# Patient Record
Sex: MALE | Race: WHITE | NOT HISPANIC OR LATINO | URBAN - METROPOLITAN AREA
[De-identification: names, ages, dates, MRNs, and addresses within clinical notes are randomized per-mention and may not be internally consistent; named-entity substitution may affect disease eponyms.]

---

## 2018-04-25 ENCOUNTER — APPOINTMENT (OUTPATIENT)
Dept: RADIOLOGY | Facility: MEDICAL CENTER | Age: 48
DRG: 231 | End: 2018-04-25
Attending: EMERGENCY MEDICINE
Payer: COMMERCIAL

## 2018-04-25 ENCOUNTER — HOSPITAL ENCOUNTER (INPATIENT)
Facility: MEDICAL CENTER | Age: 48
LOS: 13 days | DRG: 231 | End: 2018-05-08
Attending: EMERGENCY MEDICINE | Admitting: INTERNAL MEDICINE
Payer: COMMERCIAL

## 2018-04-25 DIAGNOSIS — I21.3 ST ELEVATION MYOCARDIAL INFARCTION (STEMI), UNSPECIFIED ARTERY (HCC): ICD-10-CM

## 2018-04-25 DIAGNOSIS — Z95.1 S/P CABG X 3: ICD-10-CM

## 2018-04-25 LAB
APTT PPP: 29.1 SEC (ref 24.7–36)
BASOPHILS # BLD AUTO: 0.3 % (ref 0–1.8)
BASOPHILS # BLD: 0.04 K/UL (ref 0–0.12)
BNP SERPL-MCNC: 47 PG/ML (ref 0–100)
EKG IMPRESSION: NORMAL
EOSINOPHIL # BLD AUTO: 0.17 K/UL (ref 0–0.51)
EOSINOPHIL NFR BLD: 1.4 % (ref 0–6.9)
ERYTHROCYTE [DISTWIDTH] IN BLOOD BY AUTOMATED COUNT: 41.1 FL (ref 35.9–50)
HCT VFR BLD AUTO: 43 % (ref 42–52)
HGB BLD-MCNC: 14.5 G/DL (ref 14–18)
IMM GRANULOCYTES # BLD AUTO: 0.04 K/UL (ref 0–0.11)
IMM GRANULOCYTES NFR BLD AUTO: 0.3 % (ref 0–0.9)
INR PPP: 1.01 (ref 0.87–1.13)
LYMPHOCYTES # BLD AUTO: 1.86 K/UL (ref 1–4.8)
LYMPHOCYTES NFR BLD: 15.8 % (ref 22–41)
MCH RBC QN AUTO: 30.3 PG (ref 27–33)
MCHC RBC AUTO-ENTMCNC: 33.7 G/DL (ref 33.7–35.3)
MCV RBC AUTO: 90 FL (ref 81.4–97.8)
MONOCYTES # BLD AUTO: 0.95 K/UL (ref 0–0.85)
MONOCYTES NFR BLD AUTO: 8.1 % (ref 0–13.4)
NEUTROPHILS # BLD AUTO: 8.68 K/UL (ref 1.82–7.42)
NEUTROPHILS NFR BLD: 74.1 % (ref 44–72)
NRBC # BLD AUTO: 0 K/UL
NRBC BLD-RTO: 0 /100 WBC
PLATELET # BLD AUTO: 145 K/UL (ref 164–446)
PMV BLD AUTO: 11.2 FL (ref 9–12.9)
PROTHROMBIN TIME: 13 SEC (ref 12–14.6)
RBC # BLD AUTO: 4.78 M/UL (ref 4.7–6.1)
WBC # BLD AUTO: 11.7 K/UL (ref 4.8–10.8)

## 2018-04-25 PROCEDURE — 360979 HCHG DIAGNOSTIC CATH

## 2018-04-25 PROCEDURE — 99223 1ST HOSP IP/OBS HIGH 75: CPT | Performed by: HOSPITALIST

## 2018-04-25 PROCEDURE — 307093 HCHG TR BAND RADIAL

## 2018-04-25 PROCEDURE — 83880 ASSAY OF NATRIURETIC PEPTIDE: CPT

## 2018-04-25 PROCEDURE — 99153 MOD SED SAME PHYS/QHP EA: CPT

## 2018-04-25 PROCEDURE — 93458 L HRT ARTERY/VENTRICLE ANGIO: CPT

## 2018-04-25 PROCEDURE — C1887 CATHETER, GUIDING: HCPCS

## 2018-04-25 PROCEDURE — 85610 PROTHROMBIN TIME: CPT

## 2018-04-25 PROCEDURE — C1769 GUIDE WIRE: HCPCS

## 2018-04-25 PROCEDURE — 99285 EMERGENCY DEPT VISIT HI MDM: CPT

## 2018-04-25 PROCEDURE — 304952 HCHG R 2 PADS

## 2018-04-25 PROCEDURE — 71045 X-RAY EXAM CHEST 1 VIEW: CPT

## 2018-04-25 PROCEDURE — C1894 INTRO/SHEATH, NON-LASER: HCPCS

## 2018-04-25 PROCEDURE — 93005 ELECTROCARDIOGRAM TRACING: CPT | Performed by: EMERGENCY MEDICINE

## 2018-04-25 PROCEDURE — 99152 MOD SED SAME PHYS/QHP 5/>YRS: CPT

## 2018-04-25 PROCEDURE — 700101 HCHG RX REV CODE 250

## 2018-04-25 PROCEDURE — 02703ZZ DILATION OF CORONARY ARTERY, ONE ARTERY, PERCUTANEOUS APPROACH: ICD-10-PCS | Performed by: INTERNAL MEDICINE

## 2018-04-25 PROCEDURE — 770022 HCHG ROOM/CARE - ICU (200)

## 2018-04-25 PROCEDURE — B2151ZZ FLUOROSCOPY OF LEFT HEART USING LOW OSMOLAR CONTRAST: ICD-10-PCS | Performed by: INTERNAL MEDICINE

## 2018-04-25 PROCEDURE — 92941 PRQ TRLML REVSC TOT OCCL AMI: CPT | Mod: LC

## 2018-04-25 PROCEDURE — 700111 HCHG RX REV CODE 636 W/ 250 OVERRIDE (IP)

## 2018-04-25 PROCEDURE — C1725 CATH, TRANSLUMIN NON-LASER: HCPCS

## 2018-04-25 PROCEDURE — B2111ZZ FLUOROSCOPY OF MULTIPLE CORONARY ARTERIES USING LOW OSMOLAR CONTRAST: ICD-10-PCS | Performed by: INTERNAL MEDICINE

## 2018-04-25 PROCEDURE — 85347 COAGULATION TIME ACTIVATED: CPT

## 2018-04-25 PROCEDURE — 85730 THROMBOPLASTIN TIME PARTIAL: CPT

## 2018-04-25 PROCEDURE — 85025 COMPLETE CBC W/AUTO DIFF WBC: CPT

## 2018-04-25 PROCEDURE — 4A023N7 MEASUREMENT OF CARDIAC SAMPLING AND PRESSURE, LEFT HEART, PERCUTANEOUS APPROACH: ICD-10-PCS | Performed by: INTERNAL MEDICINE

## 2018-04-25 RX ORDER — LIDOCAINE HYDROCHLORIDE 20 MG/ML
INJECTION, SOLUTION INFILTRATION; PERINEURAL
Status: COMPLETED
Start: 2018-04-25 | End: 2018-04-25

## 2018-04-25 RX ORDER — ONDANSETRON 2 MG/ML
4 INJECTION INTRAMUSCULAR; INTRAVENOUS EVERY 4 HOURS PRN
Status: DISCONTINUED | OUTPATIENT
Start: 2018-04-25 | End: 2018-04-27

## 2018-04-25 RX ORDER — SCOLOPAMINE TRANSDERMAL SYSTEM 1 MG/1
1 PATCH, EXTENDED RELEASE TRANSDERMAL
Status: DISCONTINUED | OUTPATIENT
Start: 2018-04-25 | End: 2018-04-27

## 2018-04-25 RX ORDER — ROSUVASTATIN CALCIUM 10 MG/1
20 TABLET, COATED ORAL EVERY EVENING
Status: DISCONTINUED | OUTPATIENT
Start: 2018-04-26 | End: 2018-05-08 | Stop reason: HOSPADM

## 2018-04-25 RX ORDER — VERAPAMIL HYDROCHLORIDE 2.5 MG/ML
INJECTION, SOLUTION INTRAVENOUS
Status: COMPLETED
Start: 2018-04-25 | End: 2018-04-25

## 2018-04-25 RX ORDER — DIPHENHYDRAMINE HYDROCHLORIDE 50 MG/ML
25 INJECTION INTRAMUSCULAR; INTRAVENOUS EVERY 6 HOURS PRN
Status: DISCONTINUED | OUTPATIENT
Start: 2018-04-25 | End: 2018-04-27

## 2018-04-25 RX ORDER — DEXAMETHASONE SODIUM PHOSPHATE 4 MG/ML
4 INJECTION, SOLUTION INTRA-ARTICULAR; INTRALESIONAL; INTRAMUSCULAR; INTRAVENOUS; SOFT TISSUE
Status: DISCONTINUED | OUTPATIENT
Start: 2018-04-25 | End: 2018-04-27

## 2018-04-25 RX ORDER — HALOPERIDOL 5 MG/ML
1 INJECTION INTRAMUSCULAR EVERY 6 HOURS PRN
Status: DISCONTINUED | OUTPATIENT
Start: 2018-04-25 | End: 2018-04-27

## 2018-04-25 RX ORDER — HEPARIN SODIUM,PORCINE 1000/ML
VIAL (ML) INJECTION
Status: COMPLETED
Start: 2018-04-25 | End: 2018-04-25

## 2018-04-25 RX ORDER — MIDAZOLAM HYDROCHLORIDE 1 MG/ML
INJECTION INTRAMUSCULAR; INTRAVENOUS
Status: COMPLETED
Start: 2018-04-25 | End: 2018-04-25

## 2018-04-25 RX ADMIN — NITROGLYCERIN 10 ML: 20 INJECTION INTRAVENOUS at 23:12

## 2018-04-25 RX ADMIN — FENTANYL CITRATE 50 MCG: 50 INJECTION, SOLUTION INTRAMUSCULAR; INTRAVENOUS at 23:40

## 2018-04-25 RX ADMIN — HEPARIN SODIUM: 1000 INJECTION, SOLUTION INTRAVENOUS; SUBCUTANEOUS at 23:11

## 2018-04-25 RX ADMIN — VERAPAMIL HYDROCHLORIDE 5 MG: 2.5 INJECTION, SOLUTION INTRAVENOUS at 23:12

## 2018-04-25 RX ADMIN — LIDOCAINE HYDROCHLORIDE: 20 INJECTION, SOLUTION INFILTRATION; PERINEURAL at 23:11

## 2018-04-25 RX ADMIN — MIDAZOLAM 1.5 MG: 1 INJECTION INTRAMUSCULAR; INTRAVENOUS at 23:40

## 2018-04-25 ASSESSMENT — LIFESTYLE VARIABLES: EVER_SMOKED: NEVER

## 2018-04-26 ENCOUNTER — APPOINTMENT (OUTPATIENT)
Dept: RADIOLOGY | Facility: MEDICAL CENTER | Age: 48
DRG: 231 | End: 2018-04-26
Attending: NURSE PRACTITIONER
Payer: COMMERCIAL

## 2018-04-26 PROBLEM — I21.3 STEMI (ST ELEVATION MYOCARDIAL INFARCTION) (HCC): Status: ACTIVE | Noted: 2018-04-26

## 2018-04-26 PROBLEM — I10 HTN (HYPERTENSION): Status: ACTIVE | Noted: 2018-04-26

## 2018-04-26 LAB
ABO GROUP BLD: NORMAL
ABO GROUP BLD: NORMAL
ACT BLD: 257 SEC (ref 74–137)
ALBUMIN SERPL BCP-MCNC: 3.6 G/DL (ref 3.2–4.9)
ALBUMIN SERPL BCP-MCNC: 4 G/DL (ref 3.2–4.9)
ALBUMIN/GLOB SERPL: 1.3 G/DL
ALBUMIN/GLOB SERPL: 1.5 G/DL
ALP SERPL-CCNC: 65 U/L (ref 30–99)
ALP SERPL-CCNC: 66 U/L (ref 30–99)
ALT SERPL-CCNC: 67 U/L (ref 2–50)
ALT SERPL-CCNC: 75 U/L (ref 2–50)
ANION GAP SERPL CALC-SCNC: 6 MMOL/L (ref 0–11.9)
ANION GAP SERPL CALC-SCNC: 9 MMOL/L (ref 0–11.9)
APPEARANCE UR: CLEAR
APTT PPP: 44.3 SEC (ref 24.7–36)
APTT PPP: 49.6 SEC (ref 24.7–36)
APTT PPP: 50.6 SEC (ref 24.7–36)
APTT PPP: 63.2 SEC (ref 24.7–36)
AST SERPL-CCNC: 162 U/L (ref 12–45)
AST SERPL-CCNC: 229 U/L (ref 12–45)
BACTERIA #/AREA URNS HPF: NEGATIVE /HPF
BASOPHILS # BLD AUTO: 0.2 % (ref 0–1.8)
BASOPHILS # BLD: 0.02 K/UL (ref 0–0.12)
BILIRUB CONJ SERPL-MCNC: 0.4 MG/DL (ref 0.1–0.5)
BILIRUB INDIRECT SERPL-MCNC: 3.4 MG/DL (ref 0–1)
BILIRUB SERPL-MCNC: 2.6 MG/DL (ref 0.1–1.5)
BILIRUB SERPL-MCNC: 3.8 MG/DL (ref 0.1–1.5)
BILIRUB UR QL STRIP.AUTO: NEGATIVE
BLD GP AB SCN SERPL QL: NORMAL
BUN SERPL-MCNC: 12 MG/DL (ref 8–22)
BUN SERPL-MCNC: 9 MG/DL (ref 8–22)
CALCIUM SERPL-MCNC: 8.7 MG/DL (ref 8.5–10.5)
CALCIUM SERPL-MCNC: 8.9 MG/DL (ref 8.5–10.5)
CHLORIDE SERPL-SCNC: 103 MMOL/L (ref 96–112)
CHLORIDE SERPL-SCNC: 107 MMOL/L (ref 96–112)
CO2 SERPL-SCNC: 24 MMOL/L (ref 20–33)
CO2 SERPL-SCNC: 25 MMOL/L (ref 20–33)
COLOR UR: YELLOW
CREAT SERPL-MCNC: 0.85 MG/DL (ref 0.5–1.4)
CREAT SERPL-MCNC: 1.07 MG/DL (ref 0.5–1.4)
EKG IMPRESSION: NORMAL
EKG IMPRESSION: NORMAL
EOSINOPHIL # BLD AUTO: 0.12 K/UL (ref 0–0.51)
EOSINOPHIL NFR BLD: 1.2 % (ref 0–6.9)
EPI CELLS #/AREA URNS HPF: NEGATIVE /HPF
ERYTHROCYTE [DISTWIDTH] IN BLOOD BY AUTOMATED COUNT: 39.6 FL (ref 35.9–50)
EST. AVERAGE GLUCOSE BLD GHB EST-MCNC: 103 MG/DL
EST. AVERAGE GLUCOSE BLD GHB EST-MCNC: 103 MG/DL
GLOBULIN SER CALC-MCNC: 2.6 G/DL (ref 1.9–3.5)
GLOBULIN SER CALC-MCNC: 2.7 G/DL (ref 1.9–3.5)
GLUCOSE SERPL-MCNC: 103 MG/DL (ref 65–99)
GLUCOSE SERPL-MCNC: 109 MG/DL (ref 65–99)
GLUCOSE UR STRIP.AUTO-MCNC: NEGATIVE MG/DL
HBA1C MFR BLD: 5.2 % (ref 0–5.6)
HBA1C MFR BLD: 5.2 % (ref 0–5.6)
HCT VFR BLD AUTO: 42.3 % (ref 42–52)
HGB BLD-MCNC: 15.1 G/DL (ref 14–18)
HYALINE CASTS #/AREA URNS LPF: ABNORMAL /LPF
IMM GRANULOCYTES # BLD AUTO: 0.03 K/UL (ref 0–0.11)
IMM GRANULOCYTES NFR BLD AUTO: 0.3 % (ref 0–0.9)
INR PPP: 1.08 (ref 0.87–1.13)
KETONES UR STRIP.AUTO-MCNC: ABNORMAL MG/DL
LEUKOCYTE ESTERASE UR QL STRIP.AUTO: NEGATIVE
LIPASE SERPL-CCNC: 82 U/L (ref 11–82)
LV EJECT FRACT  99904: 45
LV EJECT FRACT MOD 2C 99903: 57.22
LV EJECT FRACT MOD 4C 99902: 56.75
LV EJECT FRACT MOD BP 99901: 58.5
LYMPHOCYTES # BLD AUTO: 1.63 K/UL (ref 1–4.8)
LYMPHOCYTES NFR BLD: 16.8 % (ref 22–41)
MCH RBC QN AUTO: 31 PG (ref 27–33)
MCHC RBC AUTO-ENTMCNC: 35.7 G/DL (ref 33.7–35.3)
MCV RBC AUTO: 86.9 FL (ref 81.4–97.8)
MICRO URNS: ABNORMAL
MONOCYTES # BLD AUTO: 0.98 K/UL (ref 0–0.85)
MONOCYTES NFR BLD AUTO: 10.1 % (ref 0–13.4)
NEUTROPHILS # BLD AUTO: 6.9 K/UL (ref 1.82–7.42)
NEUTROPHILS NFR BLD: 71.4 % (ref 44–72)
NITRITE UR QL STRIP.AUTO: NEGATIVE
NRBC # BLD AUTO: 0 K/UL
NRBC BLD-RTO: 0 /100 WBC
PH UR STRIP.AUTO: 6 [PH]
PLATELET # BLD AUTO: 148 K/UL (ref 164–446)
PMV BLD AUTO: 10.5 FL (ref 9–12.9)
POTASSIUM SERPL-SCNC: 3.6 MMOL/L (ref 3.6–5.5)
POTASSIUM SERPL-SCNC: 4 MMOL/L (ref 3.6–5.5)
PROT SERPL-MCNC: 6.3 G/DL (ref 6–8.2)
PROT SERPL-MCNC: 6.6 G/DL (ref 6–8.2)
PROT UR QL STRIP: NEGATIVE MG/DL
PROTHROMBIN TIME: 13.7 SEC (ref 12–14.6)
RBC # BLD AUTO: 4.87 M/UL (ref 4.7–6.1)
RBC # URNS HPF: ABNORMAL /HPF
RBC UR QL AUTO: ABNORMAL
RH BLD: NORMAL
RH BLD: NORMAL
SODIUM SERPL-SCNC: 137 MMOL/L (ref 135–145)
SODIUM SERPL-SCNC: 137 MMOL/L (ref 135–145)
SP GR UR STRIP.AUTO: 1.01
TROPONIN I SERPL-MCNC: >50 NG/ML (ref 0–0.04)
TSH SERPL DL<=0.005 MIU/L-ACNC: 2.03 UIU/ML (ref 0.38–5.33)
UROBILINOGEN UR STRIP.AUTO-MCNC: 1 MG/DL
WBC # BLD AUTO: 9.7 K/UL (ref 4.8–10.8)
WBC #/AREA URNS HPF: ABNORMAL /HPF

## 2018-04-26 PROCEDURE — 700102 HCHG RX REV CODE 250 W/ 637 OVERRIDE(OP): Performed by: NURSE PRACTITIONER

## 2018-04-26 PROCEDURE — 84443 ASSAY THYROID STIM HORMONE: CPT

## 2018-04-26 PROCEDURE — 71046 X-RAY EXAM CHEST 2 VIEWS: CPT

## 2018-04-26 PROCEDURE — 86850 RBC ANTIBODY SCREEN: CPT

## 2018-04-26 PROCEDURE — 85025 COMPLETE CBC W/AUTO DIFF WBC: CPT

## 2018-04-26 PROCEDURE — 700111 HCHG RX REV CODE 636 W/ 250 OVERRIDE (IP): Performed by: NURSE PRACTITIONER

## 2018-04-26 PROCEDURE — 93970 EXTREMITY STUDY: CPT

## 2018-04-26 PROCEDURE — 94010 BREATHING CAPACITY TEST: CPT

## 2018-04-26 PROCEDURE — 93005 ELECTROCARDIOGRAM TRACING: CPT | Performed by: HOSPITALIST

## 2018-04-26 PROCEDURE — 770022 HCHG ROOM/CARE - ICU (200)

## 2018-04-26 PROCEDURE — 93306 TTE W/DOPPLER COMPLETE: CPT

## 2018-04-26 PROCEDURE — 83690 ASSAY OF LIPASE: CPT

## 2018-04-26 PROCEDURE — 85610 PROTHROMBIN TIME: CPT

## 2018-04-26 PROCEDURE — A9270 NON-COVERED ITEM OR SERVICE: HCPCS | Performed by: INTERNAL MEDICINE

## 2018-04-26 PROCEDURE — A9270 NON-COVERED ITEM OR SERVICE: HCPCS | Performed by: HOSPITALIST

## 2018-04-26 PROCEDURE — 700102 HCHG RX REV CODE 250 W/ 637 OVERRIDE(OP): Performed by: HOSPITALIST

## 2018-04-26 PROCEDURE — 82248 BILIRUBIN DIRECT: CPT

## 2018-04-26 PROCEDURE — 86901 BLOOD TYPING SEROLOGIC RH(D): CPT

## 2018-04-26 PROCEDURE — 86900 BLOOD TYPING SEROLOGIC ABO: CPT

## 2018-04-26 PROCEDURE — 700102 HCHG RX REV CODE 250 W/ 637 OVERRIDE(OP): Performed by: INTERNAL MEDICINE

## 2018-04-26 PROCEDURE — 700111 HCHG RX REV CODE 636 W/ 250 OVERRIDE (IP): Performed by: INTERNAL MEDICINE

## 2018-04-26 PROCEDURE — 84484 ASSAY OF TROPONIN QUANT: CPT

## 2018-04-26 PROCEDURE — 93010 ELECTROCARDIOGRAM REPORT: CPT | Mod: 76 | Performed by: INTERNAL MEDICINE

## 2018-04-26 PROCEDURE — 93880 EXTRACRANIAL BILAT STUDY: CPT

## 2018-04-26 PROCEDURE — 80053 COMPREHEN METABOLIC PANEL: CPT

## 2018-04-26 PROCEDURE — 700105 HCHG RX REV CODE 258: Performed by: NURSE PRACTITIONER

## 2018-04-26 PROCEDURE — 76705 ECHO EXAM OF ABDOMEN: CPT

## 2018-04-26 PROCEDURE — 85730 THROMBOPLASTIN TIME PARTIAL: CPT

## 2018-04-26 PROCEDURE — 93005 ELECTROCARDIOGRAM TRACING: CPT | Performed by: INTERNAL MEDICINE

## 2018-04-26 PROCEDURE — 700111 HCHG RX REV CODE 636 W/ 250 OVERRIDE (IP): Performed by: HOSPITALIST

## 2018-04-26 PROCEDURE — 99233 SBSQ HOSP IP/OBS HIGH 50: CPT | Performed by: HOSPITALIST

## 2018-04-26 PROCEDURE — 83036 HEMOGLOBIN GLYCOSYLATED A1C: CPT

## 2018-04-26 PROCEDURE — 700101 HCHG RX REV CODE 250: Performed by: NURSE PRACTITIONER

## 2018-04-26 PROCEDURE — 93306 TTE W/DOPPLER COMPLETE: CPT | Mod: 26 | Performed by: INTERNAL MEDICINE

## 2018-04-26 PROCEDURE — 81001 URINALYSIS AUTO W/SCOPE: CPT

## 2018-04-26 RX ORDER — ALPRAZOLAM 0.25 MG/1
0.25 TABLET ORAL EVERY 6 HOURS PRN
Status: DISCONTINUED | OUTPATIENT
Start: 2018-04-26 | End: 2018-04-27

## 2018-04-26 RX ORDER — LOSARTAN POTASSIUM 25 MG/1
TABLET ORAL DAILY
Status: ON HOLD | COMMUNITY
End: 2018-05-08

## 2018-04-26 RX ORDER — MORPHINE SULFATE 4 MG/ML
2-4 INJECTION, SOLUTION INTRAMUSCULAR; INTRAVENOUS
Status: DISCONTINUED | OUTPATIENT
Start: 2018-04-26 | End: 2018-04-27

## 2018-04-26 RX ORDER — NITROGLYCERIN 0.4 MG/1
0.4 TABLET SUBLINGUAL
Status: DISCONTINUED | OUTPATIENT
Start: 2018-04-26 | End: 2018-04-27

## 2018-04-26 RX ORDER — LOSARTAN POTASSIUM 25 MG/1
50 TABLET ORAL DAILY
Status: DISCONTINUED | OUTPATIENT
Start: 2018-04-26 | End: 2018-05-08 | Stop reason: HOSPADM

## 2018-04-26 RX ORDER — BISACODYL 10 MG
10 SUPPOSITORY, RECTAL RECTAL
Status: DISCONTINUED | OUTPATIENT
Start: 2018-04-26 | End: 2018-04-27

## 2018-04-26 RX ORDER — AMOXICILLIN 250 MG
2 CAPSULE ORAL 2 TIMES DAILY
Status: DISCONTINUED | OUTPATIENT
Start: 2018-04-26 | End: 2018-04-27

## 2018-04-26 RX ORDER — HEPARIN SODIUM 1000 [USP'U]/ML
4000 INJECTION, SOLUTION INTRAVENOUS; SUBCUTANEOUS PRN
Status: DISCONTINUED | OUTPATIENT
Start: 2018-04-25 | End: 2018-04-27

## 2018-04-26 RX ORDER — HYDRALAZINE HYDROCHLORIDE 20 MG/ML
20 INJECTION INTRAMUSCULAR; INTRAVENOUS EVERY 6 HOURS PRN
Status: DISCONTINUED | OUTPATIENT
Start: 2018-04-26 | End: 2018-04-27

## 2018-04-26 RX ORDER — POLYETHYLENE GLYCOL 3350 17 G/17G
1 POWDER, FOR SOLUTION ORAL
Status: DISCONTINUED | OUTPATIENT
Start: 2018-04-26 | End: 2018-04-27

## 2018-04-26 RX ORDER — METOPROLOL TARTRATE 50 MG/1
TABLET, FILM COATED ORAL 2 TIMES DAILY
COMMUNITY

## 2018-04-26 RX ADMIN — MUPIROCIN 1 APPLICATION: 20 OINTMENT TOPICAL at 22:48

## 2018-04-26 RX ADMIN — METOPROLOL TARTRATE 25 MG: 25 TABLET, FILM COATED ORAL at 20:22

## 2018-04-26 RX ADMIN — METOPROLOL TARTRATE 25 MG: 25 TABLET, FILM COATED ORAL at 11:10

## 2018-04-26 RX ADMIN — HEPARIN SODIUM 4000 UNITS: 1000 INJECTION, SOLUTION INTRAVENOUS; SUBCUTANEOUS at 09:49

## 2018-04-26 RX ADMIN — ROSUVASTATIN CALCIUM 20 MG: 10 TABLET, FILM COATED ORAL at 00:27

## 2018-04-26 RX ADMIN — ASPIRIN 81 MG: 81 TABLET, COATED ORAL at 07:54

## 2018-04-26 RX ADMIN — LOSARTAN POTASSIUM 50 MG: 25 TABLET, FILM COATED ORAL at 07:54

## 2018-04-26 RX ADMIN — MUPIROCIN 1 APPLICATION: 20 OINTMENT TOPICAL at 16:33

## 2018-04-26 RX ADMIN — HEPARIN SODIUM 1000 UNITS/HR: 5000 INJECTION, SOLUTION INTRAVENOUS; SUBCUTANEOUS at 01:26

## 2018-04-26 RX ADMIN — HEPARIN SODIUM 4000 UNITS: 1000 INJECTION, SOLUTION INTRAVENOUS; SUBCUTANEOUS at 23:30

## 2018-04-26 RX ADMIN — ROSUVASTATIN CALCIUM 20 MG: 10 TABLET, FILM COATED ORAL at 20:22

## 2018-04-26 RX ADMIN — STANDARDIZED SENNA CONCENTRATE AND DOCUSATE SODIUM 2 TABLET: 8.6; 5 TABLET, FILM COATED ORAL at 07:54

## 2018-04-26 RX ADMIN — MORPHINE SULFATE 4 MG: 4 INJECTION INTRAVENOUS at 02:03

## 2018-04-26 ASSESSMENT — PULMONARY FUNCTION TESTS
FEV1: 2.62
FEV1/FVC: 82%
PEFR: 7.34

## 2018-04-26 ASSESSMENT — PAIN SCALES - GENERAL
PAINLEVEL_OUTOF10: 2
PAINLEVEL_OUTOF10: 1
PAINLEVEL_OUTOF10: 0
PAINLEVEL_OUTOF10: 1
PAINLEVEL_OUTOF10: 0
PAINLEVEL_OUTOF10: 1
PAINLEVEL_OUTOF10: 0
PAINLEVEL_OUTOF10: 3
PAINLEVEL_OUTOF10: 0
PAINLEVEL_OUTOF10: 3
PAINLEVEL_OUTOF10: 0
PAINLEVEL_OUTOF10: 1

## 2018-04-26 ASSESSMENT — ENCOUNTER SYMPTOMS
DIZZINESS: 0
HEADACHES: 0
TINGLING: 0
NAUSEA: 0
WHEEZING: 0
FOCAL WEAKNESS: 0
VOMITING: 0
ABDOMINAL PAIN: 0
FEVER: 0
SHORTNESS OF BREATH: 1
PALPITATIONS: 0
SORE THROAT: 0
COUGH: 0
CHILLS: 0
DEPRESSION: 0
PHOTOPHOBIA: 0
DIARRHEA: 0
MYALGIAS: 0

## 2018-04-26 ASSESSMENT — PATIENT HEALTH QUESTIONNAIRE - PHQ9
2. FEELING DOWN, DEPRESSED, IRRITABLE, OR HOPELESS: NOT AT ALL
1. LITTLE INTEREST OR PLEASURE IN DOING THINGS: NOT AT ALL
SUM OF ALL RESPONSES TO PHQ9 QUESTIONS 1 AND 2: 0

## 2018-04-26 ASSESSMENT — COPD QUESTIONNAIRES
HAVE YOU SMOKED AT LEAST 100 CIGARETTES IN YOUR ENTIRE LIFE: NO/DON'T KNOW
COPD SCREENING SCORE: 1
DO YOU EVER COUGH UP ANY MUCUS OR PHLEGM?: NO/ONLY WITH OCCASIONAL COLDS OR INFECTIONS
DURING THE PAST 4 WEEKS HOW MUCH DID YOU FEEL SHORT OF BREATH: SOME OF THE TIME

## 2018-04-26 ASSESSMENT — LIFESTYLE VARIABLES
EVER_SMOKED: NEVER
ALCOHOL_USE: NO

## 2018-04-26 NOTE — ED PROVIDER NOTES
"ED Provider Note    Scribed for Dr. Daniele Gomez M.D. by Riky Veloz. 4/25/2018  10:40 PM    Primary care provider: No primary care provider noted.   Means of arrival: Ambulance  History obtained from: Patient, EMS  History limited by: None    CHIEF COMPLAINT  Possible STEMI    HPI  Jae Venegas is a 48 y.o. male who presents to the Emergency Department as a STEMI. Per EMS, patient began to develop chest pain 2 days that has been intermittent. Tonight he had dinner around 6 PM and began to develop this chest pain again. Immediately following ingestion, patient felt nauseated and began vomited. He then began to develop a \"burning\" sensation in his chest and rates his pain as 6/10. He also was experiencing diaphoresis during the development of his chest pain. When EMS arrived, an EKG was performed which indicated a left bundle branch block. Reevaluation of her initial EKG showing findings suggestive STEMI at that time as well The patient does admit that his chest pain has improved since his arrival to the ED. He denies any exacerbating or alleviating factors at this time.     REVIEW OF SYSTEMS  Pertinent positives include chest pain, nausea, vomiting, diaphoresis. As above, all other systems reviewed and are negative.   See HPI for further details.     C.     PAST MEDICAL HISTORY    No history pertinent to complaint.    SURGICAL HISTORY  patient denies any surgical history    SOCIAL HISTORY  Social History   Substance Use Topics   • Smoking status: None noted   • Smokeless tobacco: None noted   • Alcohol use None noted      History   Drug use: None noted       FAMILY HISTORY  No family history noted    ALLERGIES  None noted    PHYSICAL EXAM  VITAL SIGNS: There were no vitals taken for this visit.    Constitutional: Well developed, Well nourished, no distress, Non-toxic appearance. Looks uncomfortable, anxious.   HENT: Normocephalic, Atraumatic, Bilateral external ears normal, Oropharynx moist, No oral exudates. "   Eyes: PERRLA, EOMI, Conjunctiva normal, No discharge.   Neck: No tenderness, Supple, No stridor.   Lymphatic: No lymphadenopathy noted.   Cardiovascular: Normal heart rate, Normal rhythm.   Thorax & Lungs: Clear to auscultation bilaterally, No respiratory distress, No wheezing, No crackles.   Abdomen: Soft, No tenderness, No masses, No pulsatile masses.   Skin: Warm, Dry, No erythema, No rash.   Extremities:, No edema No cyanosis.   Musculoskeletal: No tenderness to palpation or major deformities noted.  Intact distal pulses  Neurologic: Awake, alert. Moves all extremities spontaneously.  Psychiatric: Affect normal, Judgment normal, Mood normal.     LABS  Results for orders placed or performed during the hospital encounter of 04/25/18   CBC with Differential   Result Value Ref Range    WBC 11.7 (H) 4.8 - 10.8 K/uL    RBC 4.78 4.70 - 6.10 M/uL    Hemoglobin 14.5 14.0 - 18.0 g/dL    Hematocrit 43.0 42.0 - 52.0 %    MCV 90.0 81.4 - 97.8 fL    MCH 30.3 27.0 - 33.0 pg    MCHC 33.7 33.7 - 35.3 g/dL    RDW 41.1 35.9 - 50.0 fL    Platelet Count 145 (L) 164 - 446 K/uL    MPV 11.2 9.0 - 12.9 fL    Neutrophils-Polys 74.10 (H) 44.00 - 72.00 %    Lymphocytes 15.80 (L) 22.00 - 41.00 %    Monocytes 8.10 0.00 - 13.40 %    Eosinophils 1.40 0.00 - 6.90 %    Basophils 0.30 0.00 - 1.80 %    Immature Granulocytes 0.30 0.00 - 0.90 %    Nucleated RBC 0.00 /100 WBC    Neutrophils (Absolute) 8.68 (H) 1.82 - 7.42 K/uL    Lymphs (Absolute) 1.86 1.00 - 4.80 K/uL    Monos (Absolute) 0.95 (H) 0.00 - 0.85 K/uL    Eos (Absolute) 0.17 0.00 - 0.51 K/uL    Baso (Absolute) 0.04 0.00 - 0.12 K/uL    Immature Granulocytes (abs) 0.04 0.00 - 0.11 K/uL    NRBC (Absolute) 0.00 K/uL   EKG (ER)   Result Value Ref Range    Report       [facility] Emergency Dept.    Test Date:  2018-04-25  Pt Name:    KENDALL SEVERINO                  Department: ER  MRN:        [id number]                      Room:       TRAUMA - EXAM 1  Gender:     Male                          Technician: [id number]  :        1970                   Requested By:DANIELE GROSSMAN  Order #:    [id number]                    Reading MD:    Measurements  Intervals                                Axis  Rate:       54                           P:          44  UT:         180                          QRS:        -31  QRSD:       142                          T:          148  QT:         496  QTc:        471    Interpretive Statements  SINUS BRADYCARDIA  PROBABLE LEFT ATRIAL ABNORMALITY  LEFT BUNDLE BRANCH BLOCK  BASELINE WANDER IN LEAD(S) V3  No previous ECG available for comparison         All labs reviewed by me.      RADIOLOGY  DX-CHEST-LIMITED (1 VIEW)   Final Result         Low lung volume. Diffuse interstitial prominence could relate to mild edema.      Mild cardiomegaly.        The radiologist's interpretation of all radiological studies have been reviewed by me.    COURSE & MEDICAL DECISION MAKING  Pertinent Labs & Imaging studies reviewed. (See chart for details)    10:40 PM - Patient seen and examined at bedside. Ordered DX-chest (1 view), troponin, BNP, CBC with differential, CMP, PTT, APTT, Lipase, EKG to evaluate his symptoms.     Decision Making:  Patient presents as acute STEMI already treated with thrombolytics. Cardiologist present at arrival patient going to Cath Lab will discuss with hospitalist. ST changes have reversed patient's asymptomatic now    FINAL IMPRESSION  STEMI     Riky CONNELL (Scribe), am scribing for, and in the presence of, Daniele Grossman M.D..    Electronically signed by: Riky Veloz (Yasmany), 2018    Daniele CONNELL M.D. personally performed the services described in this documentation, as scribed by Riky Veloz in my presence, and it is both accurate and complete.    The note accurately reflects work and decisions made by me.  Daniele Grossman  2018  11:12 PM

## 2018-04-26 NOTE — PROGRESS NOTES
Kelsey from Lab called with critical result of Troponin > 50 at 0510. Critical lab result read back to Kelsey.   Dr. Goldsmith notified of critical lab result at 0530.  Critical lab result read back by Dr. Goldsmith.

## 2018-04-26 NOTE — PROGRESS NOTES
Spoke to OPAL Myrick with [facility]. Orders to stop heparin at 0400. Surgery scheduled for 0800.

## 2018-04-26 NOTE — PROCEDURES
DATE OF SERVICE:  04/25/2018    PROCEDURES:  1.  Supervision of conscious sedation.  2.  Selective coronary angiography.  3.  Left heart catheterization.  4.  Left ventriculography.  5.  Emergency PTCA of occluded circumflex coronary artery.    INDICATIONS:  The patient is a 48-year-old gentleman who was seen in the ER in   [place] and transferred down here as a code STEMI.  He had a left   bundle-branch block that was intermittent and then transiently inferior ST   segment elevation.  He was taken emergently from the ER to the cath lab for   coronary angiography and possible intervention.    DESCRIPTION OF PROCEDURE:  The right wrist was sterilely prepped and draped in   the usual fashion and he was premedicated with 1 mg of Versed and 50 mcg of   fentanyl for sedation.  The area of the right radial artery was anesthetized   with 2% lidocaine and the artery was then easily entered.  He had received   subcutaneous heparin and TNK prior to transfer.  The patient was given   intra-arterial verapamil and nitroglycerin as well as 5000 units of   intravenous heparin.  A 5-Lithuanian Petey catheter was placed and advanced into   the ostium of the left main coronary artery where selective angiograms were   performed.  This catheter was then manipulated into the ostium of the right   coronary artery where selective angiograms were again performed.    Following this, a pigtail catheter was exchanged and a left heart   catheterization was performed.  This was followed by left ventriculogram using   24 mL of contrast injected over 3 seconds.    An ACT was checked and a coronary intervention was performed.  An EBU guide   was placed and a BMW wire was placed across the occlusion in the circumflex.    This was angioplastied with 3.0x15 mm compliant balloon deployed at 12   atmospheres and 10 atmospheres.  Final results revealed no residual stenosis   and EZEKIEL-3 flow to the terminus of the circumflex marginal.  Since the patient    has severe 3-vessel disease and will need CABG, it was elected not to place a   stent in this artery.    The guiding catheter was removed and additional 100 mcg of nitroglycerin was   given through the sheath.  The sheath was then removed and hemostasis was   attained by compression with Hemoband.    COMPLICATIONS:  There were no complications.    ESTIMATED BLOOD LOSS:  Less than 10 mL.    FLUOROSCOPY TIME:  4.5 minutes.    X-RAY DOSE-AREA PRODUCT:  15,677.    TOTAL CONTRAST MEDIA:  146 mL of Omnipaque 350.    CONSCIOUS SEDATION:  Start time 11:03 p.m., conscious sedation supervision end   time 11:30 p.m.    HEMODYNAMICS:  Reveal sinus rhythm throughout the procedure.  Aortic pressure   was 152/79 mmHg.  Left ventricular pressure was 125/31 mmHg.    Fluoroscopy of the heart is unremarkable.    The left main is free of disease and bifurcates into the LAD and circumflex.    The circumflex gives rise to a proximal moderate sized first marginal artery   that has a 90% lesion in its proximal segment.  There are 2 small marginal   arteries that arise proximally and then the circumflex is subtotally occluded   with a greater than 99% stenosis and EZEKIEL 1 flow beyond.  Post-intervention of   the circumflex, there is no significant residual stenosis and EZEKIEL-3 flow to   the terminus of the vessel demonstrates there is severe stenosis in the very   distal circumflex marginal artery.    The LAD supplies the apex and inferoapical segment of the left ventricle.    This vessel gives rise to a small first diagonal artery and a large second   diagonal artery.  Just beyond the second diagonal artery, there is a greater   than 95% stenosis in the LAD.  The very distal LAD also has subtotal stenosis   near the apex of the vessel.  The major diagonal artery also has a severe   stenosis.  This vessel bifurcates and a more medial limb of the bifurcation   has an elongated stenosis of greater than 90%.  At the bifurcation of the    diagonal, there is a 90% eccentric stenosis in the diagonal.    The right coronary artery is a dominant vessel and has an eccentric 80%   stenosis midway between the acute margin and crux.  There is also a 70%   stenosis proximal to the acute margin.  Distally, the right coronary artery   gives rise to ____ moderate size PDA and a normal bifurcating posterior left   ventricular artery.    The left ventriculogram demonstrates akinesis of the mid inferior wall.    Calculated ejection fraction is 50%.    IMPRESSION:  1.  Successful emergency angioplasty of subtotally occluded mid circumflex   coronary artery.  2.  A 95% or greater lesion in mid left anterior descending.  3.  Eccentric 90% lesion in proximal bifurcating diagonal artery.  4.  Subtotal occlusion of the very distal left anterior descending near the   apex.  5.  Sequential 70 and 80% lesions in the mid and distal right coronary artery.  6.  Left ventricular dysfunction with elevated LVEDP and akinesis of the mid   inferior wall.       ____________________________________     MD RIAN MONTELONGO / AQUILINO    DD:  04/26/2018 07:27:59  DT:  04/26/2018 07:58:13    D#:  [id number]  Job#:  [id number]

## 2018-04-26 NOTE — CONSULTS
DATE OF SERVICE:  04/26/2018    REQUESTING PHYSICIAN:  Bryan Gomes MD    CONSULTING PHYSICIAN:  Donald Pike MD    REASON FOR CONSULTATION:  STEMI, severe multivessel coronary artery disease.    CHIEF COMPLAINT:  Chest pain.    HISTORY OF PRESENT ILLNESS:  This is a 48-year-old  passing   through from [place] who presented to the [place] emergency department as a   STEMI.  He was subsequently found to have severe multivessel coronary artery   disease.  The patient states he began having intermittent bilateral arm and   chest pain on Monday that would come and go.  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  More   recently yesterday, he was driving over the [place] and began again   experiencing the same chest pressure as well as right arm pressure that   eventually progress also left-sided arm pain.  He pulled over.  He had some   dinner.  He then began to feel nauseated and dizzy and vomited.  He then   developed more burning sensation in his chest as well as worsening of the   severity of chest pain.  EMS was contacted and when they arrived, an EKG was   performed indicating a left bundle-branch block and also STEMI changes as   well.  Patient was subsequently transferred to [facility] for further care.  He   received thrombolytic therapy while at [place] prior to transfer to [facility].    Initial EKG at [facility] demonstrated a left bundle-branch block along with   lateral T-wave inversions.  The left bundle-branch block was found to be   intermittent and he was still having a lateral T-wave inversion and   intermittent ST elevation.  Therefore, the decision was made to bring him for   coronary angiography.  This revealed a 90% proximal OM1 lesion, there were    2 small marginal arteries and then after these arteries with mid   circumflex was subtotally occluded with greater than 99% stenosis and EZEKIEL 1   flow.  TOBA intervention was  performed, which brought no significant residual   stenosis and EZEKIEL-3 flow to the terminus of the circumflex:  The left anterior   descending artery demonstrated a 95% proximal stenosis and there is a very   distal subtotal stenosis near the apex of the LAD.  There is also a large   bifurcating diagonal artery with proximal stenosis and more medial limb of the   bifurcation with an elongated 90% stenosis and other vessels with small   caliber there.  In addition, the right coronary artery was a dominant vessel   and has an eccentric 80% stenosis at the mid portion as well as 70% stenosis   proximal to the acute marginal artery.  There is also a moderate-sized   posterior descending artery and a normal bifurcating posterior ventricular   artery.  The left ventriculogram demonstrated akinesis of the mid inferior   wall and a calculated ejection fraction was 50%.  Patient was subsequently   transferred to the cardiac intensive care unit.  I was subsequently consulted   for revascularization.  Currently, the patient denies any chest pain,   shortness of breath, dyspnea on exertion, paroxysmal nocturnal dyspnea, or   orthopnea.  He has not had any further episodes of chest pain since this   intervention.  He has no family along with him because he lives in [place].  He   now endorses symptoms of prior upper thorax discomfort mostly of his right arm   with intermittent numbness and occasional anterior chest pain that has been   occurring intermittently over the past 2 years.  He has sought the care of a   physician for treatment of hypertension.    PAST MEDICAL HISTORY:  Hypertension, obesity    ALLERGIES:  None.    FAMILY HISTORY:  His father is 82, living with coronary artery disease.  He   had bypass surgery in his 70s.  His mother is still living and does not have   any heart disease; however, there is history of heart disease on her side of   the family.    SOCIAL HISTORY:  Patient lives in [place].  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    He is a  for occupation.  He is a lifelong nonsmoker, does not   drink alcohol.  Denies any illicit drug use.  He lives a very sedentary   lifestyle in his truck most of the time.    REVIEW OF SYSTEMS:  A complete 14-point review of systems was performed and   was negative except for following chest pain and arm pain (none currently).    PHYSICAL EXAMINATION:  VITAL SIGNS:  Height 185 cm, weight 111 kg.  Temperature 36.9 degrees Celsius,   pulse 60, respiratory rate 18, satting 95% on room air, and blood pressure   129/90.  GENERAL:  He is a mildly obese, but disheveled-appearing adult  male   in no apparent distress.  HEENT:  Normocephalic, atraumatic.  His oral and nasal mucosa are moist.    Sclerae are anicteric.  Dentition is fair.  NECK:  There is no jugular venous distention.  There are no carotid bruits.    There is no cervical lymphadenopathy.  SKIN:  Normal turgor without rashes noted.  Respiratory:  Moves air well bilaterally without the use of accessory   respiratory muscles.  CARDIOVASCULAR:  Normal rate, regular rhythm.  ABDOMEN:  Soft, nontender, nondistended.  There are no palpable masses.  There   is no right upper quadrant tenderness.  EXTREMITIES:  Warm and well perfused.  There is no cyanosis, clubbing, or   edema.  He has palpable bilateral radial and DP pulses.  NEUROLOGIC:  He is alert and oriented x3.  There are no gross neurologic   deficits.  PSYCHIATRIC:  Mood and affect normal.  MUSCULOSKELETAL:  Joints are within normal limits.    LABORATORY DATA:  Significant for white count of 11.7, , ALT 67,   alkaline phosphatase 65, total bilirubin 2.6, troponin greater than 50,   creatinine 1.07, hematocrit 43.    ASSESSMENT:  Severe multivessel coronary artery disease, ST elevation   myocardial infarction, and hypertension.    PLAN:  This is a 48-year-old man who presented with acute STEMI and underwent   a POBA to the mid circumflex artery for  restoration of flow, but now has   residual severe multivessel coronary artery disease.  He was subsequently   referred to me for revascularization.  I believe he is a good candidate for   coronary artery bypass grafting.  His STS risk of mortality of 0.6% and risk   of morbidity and mortality is 10%.  He is a Child-Mcgowan class A and [place]   Heart Association class I.  The risks and benefits of coronary bypass grafting   were discussed with the patient with risks including myocardial infarction,   stroke, prolonged ventilation, tracheostomy, pneumonia, sternal wound   infection, permanent pacemaker, bleeding, renal failure as well as death.    Patient understood these risks and wished to proceed with surgery.  We will   plan to perform likely 3-4 bypass grafts with possible left bilateral internal   mammary artery harvesting.  In the interim, I will order a standard   preoperative testing and in addition a right upper quadrant abdominal   ultrasound given his elevated liver function test and I have tentatively   scheduled operation for April 27th at 8:00 a.m.    Thank you very much for allowing me to participate in the care of this   patient.  Of course, we will keep you update with his progress.       ____________________________________     MD STEPHANIE Agee / AQUILINO    DD:  04/26/2018 10:55:25  DT:  04/26/2018 12:16:26    D#:  [id number]  Job#:  [id number]

## 2018-04-26 NOTE — PROGRESS NOTES
Cardiology Progress Note:                                       Note Author:   Everette YiKhafaji  Date & Time note created:    4/26/2018   8:01 AM       Patient ID:  Name:             Jae Venegas     YOB: 1970  Age:                 48 y.o.  male   MRN:               [id number]                                                             ID:     Jae Venegas is a 48 y.o. male with past medical history of HTN (on valsartan and metoprolol), who had waxing/waning chest pain for two days before presented to outside hospital with STEMI LBBB. Received  Lytics therapy. And transferred to [facility], went to urgent cath due to followed EKGs still showing deep symmetrical TWI laterally with intermittent LBBB. Hemodynamically stable. Patient underwent urgent cardiac catheterization showed multivessel disease and considering for CABG tomorrow     Interval history   Patient pain improving, 2/10, normal pulse on the right wrist, the patient will have CABG tomorrow, currently on a heparin drip, still developing intermittent LBBB on tele.         Intake/Output Summary (Last 24 hours) at 04/26/18 0801  Last data filed at 04/26/18 0600   Gross per 24 hour   Intake           171.33 ml   Output                0 ml   Net           171.33 ml       Review of Systems:      Constitutional: Denies fevers, Denies weight changes  Eyes: Denies changes in vision, no eye pain  Ears/Nose/Throat/Mouth: Denies nasal congestion or sore throat   Cardiovascular: mild chest pain, denies palpitations   Respiratory: no shortness of breath , Denies cough  Gastrointestinal/Hepatic: Denies abdominal pain, nausea, vomiting, diarrhea, constipation or GI bleeding   Genitourinary: Denies dysuria or frequency  Musculoskeletal/Rheum: Denies  joint pain and swelling, edema  Skin: Denies rash  Neurological: Denies headache, confusion, memory loss or focal weakness/parasthesias  Psychiatric: denies mood disorder   Endocrine: Gila thyroid  problems  Heme/Oncology/Lymph Nodes: Denies enlarged lymph nodes       Past Medical History:   Past Medical History:   Diagnosis Date   • Hypertension      Active Hospital Problems    Diagnosis   • STEMI (ST elevation myocardial infarction) (CMS-HCC) [I21.3]     Priority: High   • HTN (hypertension) [I10]       Past Surgical History:  Past Surgical History:   Procedure Laterality Date   • OTHER ORTHOPEDIC SURGERY         Hospital Medications:  Current Facility-Administered Medications   Medication Dose   • heparin injection 4,000 Units  4,000 Units    And   • heparin infusion 25,000 units in 500 ml 0.45% nacl     • MD ALERT...Heparin Post-Fibrinolytic Protocol Pharmacist to implement     • Influenza Vaccine Quad pf injection 0.5 mL  0.5 mL   • losartan (COZAAR) tablet 50 mg  50 mg   • senna-docusate (PERICOLACE or SENOKOT S) 8.6-50 MG per tablet 2 Tab  2 Tab    And   • polyethylene glycol/lytes (MIRALAX) PACKET 1 Packet  1 Packet    And   • magnesium hydroxide (MILK OF MAGNESIA) suspension 30 mL  30 mL    And   • bisacodyl (DULCOLAX) suppository 10 mg  10 mg   • Respiratory Care per Protocol     • nitroglycerin (NITROSTAT) tablet 0.4 mg  0.4 mg   • morphine (pf) 4 mg/ml injection 2-4 mg  2-4 mg   • hydrALAZINE (APRESOLINE) injection 20 mg  20 mg   • ondansetron (ZOFRAN) syringe/vial injection 4 mg  4 mg   • dexamethasone (DECADRON) injection 4 mg  4 mg   • diphenhydrAMINE (BENADRYL) injection 25 mg  25 mg   • haloperidol lactate (HALDOL) injection 1 mg  1 mg   • scopolamine (TRANSDERM-SCOP) patch 1 Patch  1 Patch   • aspirin EC (ECOTRIN) tablet 81 mg  81 mg   • rosuvastatin (CRESTOR) tablet 20 mg  20 mg   • metoprolol (LOPRESSOR) tablet 25 mg  25 mg         Current Outpatient Medications:  Prescriptions Prior to Admission   Medication Sig Dispense Refill Last Dose   • metoprolol (LOPRESSOR) 50 MG Tab Take  by mouth 2 times a day.      • losartan (COZAAR) 25 MG Tab Take  by mouth every day.          Medication  "Allergy:  No Known Allergies    Family History:  Family History   Problem Relation Age of Onset   • Heart Disease Father    • Hypertension Father    • Heart Disease Maternal Grandmother    • Hypertension Maternal Grandmother    • Heart Disease Paternal Grandfather    • Hypertension Paternal Grandfather        Social History:  Social History     Social History   • Marital status: N/A     Spouse name: N/A   • Number of children: N/A   • Years of education: N/A     Occupational History   • Not on file.     Social History Main Topics   • Smoking status: Never Smoker   • Smokeless tobacco: Never Used   • Alcohol use No   • Drug use: No   • Sexual activity: Not on file     Other Topics Concern   • Not on file     Social History Narrative   • No narrative on file         Physical Exam:  Vitals/ General Appearance:   Weight/BMI: Body mass index is 32.37 kg/m².  Blood pressure 137/92, pulse (!) 52, temperature 36.9 °C (98.4 °F), resp. rate 18, height 1.854 m (6' 1\"), weight 111.3 kg (245 lb 6 oz), SpO2 98 %.  Vitals:    04/26/18 0600 04/26/18 0615 04/26/18 0630 04/26/18 0645   BP:       Pulse: (!) 54 63 62 (!) 52   Resp: 15 18 20 18   Temp: 36.9 °C (98.4 °F)      SpO2: 99% 98% 98% 98%   Weight:       Height:         Oxygen Therapy:  Pulse Oximetry: 98 %, O2 (LPM): 2, O2 Delivery: Nasal Cannula    Constitutional:  Well developed, Well nourished, No acute distress  HENMT:  Normocephalic, Atraumatic, Oropharynx moist mucous membranes, No oral exudates, Nose normal.  No thyromegaly.  Eyes:  EOMI, Conjunctiva normal, No discharge.  Neck:  Normal range of motion, No cervical tenderness,  no JVD.  Cardiovascular:  Normal heart rate, Normal rhythm, No murmurs, No rubs, No gallops.   Extremitites with intact distal pulses on the side of catheterization   Lungs:  Normal breath sounds, breath sounds clear to auscultation bilaterally,  no crackles, no wheezing.   Abdomen: Bowel sounds normal, Soft, No tenderness, No guarding, No " rebound, No masses, No hepatosplenomegaly.  Skin: Warm, Dry, No erythema, No rash, no induration.  Neurologic: Alert & oriented x 3, No focal deficits noted, cranial nerves II through X are grossly intact.  Psychiatric: Affect normal, Judgment normal, Mood normal.      Lab Data Review:  Recent Results (from the past 24 hour(s))   EKG (ER)    Collection Time: 18 10:31 PM   Result Value Ref Range    Report       [facility]    Test Date:  2018  Pt Name:    KENDALL SEVERINO                  Department: ER  MRN:        [id number]                      Room:       TRAUMA - EXAM 1  Gender:     Male                         Technician: [id number]  :        1970                   Requested By:SUKI GROSSMAN  Order #:    [id number]                    Reading MD:    Measurements  Intervals                                Axis  Rate:       54                           P:          44  AK:         180                          QRS:        -31  QRSD:       142                          T:          148  QT:         496  QTc:        471    Interpretive Statements  SINUS BRADYCARDIA  PROBABLE LEFT ATRIAL ABNORMALITY  LEFT BUNDLE BRANCH BLOCK  BASELINE WANDER IN LEAD(S) V3  No previous ECG available for comparison     Btype Natriuretic Peptide    Collection Time: 18 10:37 PM   Result Value Ref Range    B Natriuretic Peptide 47 0 - 100 pg/mL   CBC with Differential    Collection Time: 18 10:37 PM   Result Value Ref Range    WBC 11.7 (H) 4.8 - 10.8 K/uL    RBC 4.78 4.70 - 6.10 M/uL    Hemoglobin 14.5 14.0 - 18.0 g/dL    Hematocrit 43.0 42.0 - 52.0 %    MCV 90.0 81.4 - 97.8 fL    MCH 30.3 27.0 - 33.0 pg    MCHC 33.7 33.7 - 35.3 g/dL    RDW 41.1 35.9 - 50.0 fL    Platelet Count 145 (L) 164 - 446 K/uL    MPV 11.2 9.0 - 12.9 fL    Neutrophils-Polys 74.10 (H) 44.00 - 72.00 %    Lymphocytes 15.80 (L) 22.00 - 41.00 %    Monocytes 8.10 0.00 - 13.40 %    Eosinophils 1.40 0.00 - 6.90 %     Basophils 0.30 0.00 - 1.80 %    Immature Granulocytes 0.30 0.00 - 0.90 %    Nucleated RBC 0.00 /100 WBC    Neutrophils (Absolute) 8.68 (H) 1.82 - 7.42 K/uL    Lymphs (Absolute) 1.86 1.00 - 4.80 K/uL    Monos (Absolute) 0.95 (H) 0.00 - 0.85 K/uL    Eos (Absolute) 0.17 0.00 - 0.51 K/uL    Baso (Absolute) 0.04 0.00 - 0.12 K/uL    Immature Granulocytes (abs) 0.04 0.00 - 0.11 K/uL    NRBC (Absolute) 0.00 K/uL   Prothrombin Time    Collection Time: 18 10:37 PM   Result Value Ref Range    PT 13.0 12.0 - 14.6 sec    INR 1.01 0.87 - 1.13   APTT    Collection Time: 18 10:37 PM   Result Value Ref Range    APTT 29.1 24.7 - 36.0 sec   POC ACT (resulted by nursing)    Collection Time: 18 11:24 PM   Result Value Ref Range    Istat Activated Clotting Time 257 (H) 74 - 137 sec   EKG STAT    Collection Time: 18 12:59 AM   Result Value Ref Range    Report       [facility]    Test Date:  2018  Pt Name:    KENDALL SEVERINO                  Department: 161  MRN:        [id number]                      Room:       [facility] [[email or web address]  Gender:     Male                         Technician: JAMIE  :        1970                   Requested By:ELIZABETH WELCH  Order #:    [id number]                    Reading MD:    Measurements  Intervals                                Axis  Rate:       51                           P:          55  WA:         176                          QRS:        -35  QRSD:       100                          T:          -40  QT:         464  QTc:        428    Interpretive Statements  SINUS BRADYCARDIA  LEFT AXIS DEVIATION  ABNORMAL T, CONSIDER ISCHEMIA, DIFFUSE LEADS  BASELINE WANDER IN LEAD(S) V6  Compared to ECG 2018 22:31:27  Left-axis deviation now present  T-wave abnormality now present  Possible ischemia now present  Left bundle-branch block no longer present     APTT    Collection Time: 18  1:10 AM   Result Value Ref Range    APTT 44.3 (H) 24.7 - 36.0 sec   Troponin in four (4) hours     Collection Time: 18  3:30 AM   Result Value Ref Range    Troponin I >50.00 (H) 0.00 - 0.04 ng/mL   TSH (Thyroid Stimulating Hormone)    Collection Time: 18  3:30 AM   Result Value Ref Range    TSH 2.030 0.380 - 5.330 uIU/mL   COMP METABOLIC PANEL    Collection Time: 18  3:30 AM   Result Value Ref Range    Sodium 137 135 - 145 mmol/L    Potassium 4.0 3.6 - 5.5 mmol/L    Chloride 107 96 - 112 mmol/L    Co2 24 20 - 33 mmol/L    Anion Gap 6.0 0.0 - 11.9    Glucose 109 (H) 65 - 99 mg/dL    Bun 12 8 - 22 mg/dL    Creatinine 1.07 0.50 - 1.40 mg/dL    Calcium 8.7 8.5 - 10.5 mg/dL    AST(SGOT) 229 (H) 12 - 45 U/L    ALT(SGPT) 67 (H) 2 - 50 U/L    Alkaline Phosphatase 65 30 - 99 U/L    Total Bilirubin 2.6 (H) 0.1 - 1.5 mg/dL    Albumin 3.6 3.2 - 4.9 g/dL    Total Protein 6.3 6.0 - 8.2 g/dL    Globulin 2.7 1.9 - 3.5 g/dL    A-G Ratio 1.3 g/dL   LIPASE    Collection Time: 18  3:30 AM   Result Value Ref Range    Lipase 82 11 - 82 U/L   ESTIMATED GFR    Collection Time: 18  3:30 AM   Result Value Ref Range    GFR If African American >60 >60 mL/min/1.73 m 2    GFR If Non African American >60 >60 mL/min/1.73 m 2   EKG in four (4) hours    Collection Time: 18  4:56 AM   Result Value Ref Range    Report       [facility]    Test Date:  2018  Pt Name:    KENDALL SEVERINO                  Department: 161  MRN:        [id number]                      Room:       T630  Gender:     Male                         Technician: JAMIE  :        1970                   Requested By:GRETCHEN JAMES  Order #:    [id number]                    Reading MD:    Measurements  Intervals                                Axis  Rate:       56                           P:          40  SC:         172                          QRS:        -47  QRSD:       140                          T:          106  QT:         476  QTc:        460    Interpretive Statements  SINUS BRADYCARDIA  LEFT BUNDLE BRANCH BLOCK  Compared to  ECG 04/26/2018 00:59:43  Left bundle-branch block now present  Left-axis deviation no longer present  T-wave abnormality no longer present  Possible ischemia no longer present         Imaging/Procedures Review:    DX-CHEST-LIMITED (1 VIEW)   Final Result         Low lung volume. Diffuse interstitial prominence could relate to mild edema.      Mild cardiomegaly.      Echocardiogram Comp W/O Cont    (Results Pending)     Reviewed    EKG:   Reviewed intermittent LBBB, Sinus bradycardia, resolved ST elevation      ECHO:  No results found for this or any previous visit.     MDM (Assessment and Plan):     Active Hospital Problems    Diagnosis   • STEMI (ST elevation myocardial infarction) (CMS-HCC) [I21.3]     Priority: High   • HTN (hypertension) [I10]       Assessment and plan    1. Acute STEMI mid circumflex s/p balloon angioplasty   2. Left ventricular dysfunction  3. Hypertension  4. Elevated bilirubin and transaminase   5. Reactive leukocytosis   -    95% or greater lesion in mid left anterior descending, 90% lesion in proximal bifurcating diagonal artery, subtotal occlusion of the very distal left anterior descending near the apex.  70 and 80% lesions in the mid and distal right coronary artery.      - continue aspirin, heparin drip    - continue metoprolol 25 mg BID   - NPO midnight, Dr. Pike will take patient for CABG tomorrow for multivessel disease with good targets   - continue losartan 50 mg   - continue rosuvastatin 20 mg   - Pending Echocardiogram eval   - abdominal US for elevated bilirubin    - sublingual nitroglycerin PRN               - daily CBC and CMP              - cardiology will continue to follow the patient, Thank you!

## 2018-04-26 NOTE — RESPIRATORY CARE
Bedside PFT    Bedside PFT screen:    Value Actual Predicted    PEF  7.34  69%     FEV1  2.62  60%     VC  3.19  57%     NEREYDA 82% 105%

## 2018-04-26 NOTE — PROGRESS NOTES
[facility] Hospitalist Progress Note    Date of Service: 2018    Chief Complaint  48 y.o. male admitted 2018 with chest pain    Interval Problem Update  Mr. Venegas has a history of hypertension no known history of coronary disease that developed while changing a tire. He was in [place] and presented to the emergency room there he was found to have a left bundle branch block. He was transferred to this facility where he went underwent heart catheterization was found to have bibasilar disease. Did have emergency angioplasty of the mid circumflex artery. He has been admitted to the cardiac intensive care unit for cardiothoracic surgery consultation. He will go to a bypass graft 2018.  He is on 2 liters of oxygen.   Consultants/Specialty  Cardiology  Shahzad, cardiothoracic surgery    Disposition  CICU        Review of Systems   Constitutional: Negative for chills and fever.   Cardiovascular: Negative for chest pain and palpitations.   Gastrointestinal: Negative for nausea and vomiting.   All other systems reviewed and are negative.     Physical Exam  Laboratory/Imaging   Hemodynamics  Temp (24hrs), Av.8 °C (98.3 °F), Min:36.8 °C (98.2 °F), Max:36.9 °C (98.4 °F)   Temperature: 36.9 °C (98.4 °F)  Pulse  Av  Min: 48  Max: 70 Heart Rate (Monitored): (!) 56  Blood Pressure: 137/92, NIBP: 125/91      Respiratory      Respiration: 18, Pulse Oximetry: 98 %, O2 Daily Delivery Respiratory : Silicone Nasal Cannula     Work Of Breathing / Effort: Mild  RUL Breath Sounds: Clear, RML Breath Sounds: Diminished, RLL Breath Sounds: Diminished, LAKSHMI Breath Sounds: Clear, LLL Breath Sounds: Diminished    Fluids    Intake/Output Summary (Last 24 hours) at 18 0709  Last data filed at 18 0600   Gross per 24 hour   Intake           171.33 ml   Output                0 ml   Net           171.33 ml       Nutrition  Orders Placed This Encounter   Procedures   • Diet NPO     Standing Status:   Standing     Number  of Occurrences:   1     Order Specific Question:   Restrict to:     Answer:   Sips with Medications [3]     Physical Exam   Constitutional: He is oriented to person, place, and time. No distress.   Cardiovascular: Normal rate and regular rhythm.    No murmur heard.  Pulmonary/Chest: Effort normal and breath sounds normal.   Abdominal: Soft. Bowel sounds are normal. He exhibits no distension.   Musculoskeletal: He exhibits no edema or tenderness.   Neurological: He is alert and oriented to person, place, and time.   Skin: Skin is warm and dry. He is not diaphoretic.   Psychiatric: He has a normal mood and affect. His behavior is normal.   Nursing note and vitals reviewed.      Recent Labs      04/25/18 2237   WBC  11.7*   RBC  4.78   HEMOGLOBIN  14.5   HEMATOCRIT  43.0   MCV  90.0   MCH  30.3   MCHC  33.7   RDW  41.1   PLATELETCT  145*   MPV  11.2     Recent Labs      04/26/18   0330   SODIUM  137   POTASSIUM  4.0   CHLORIDE  107   CO2  24   GLUCOSE  109*   BUN  12   CREATININE  1.07   CALCIUM  8.7     Recent Labs      04/25/18 2237 04/26/18   0110   APTT  29.1  44.3*   INR  1.01   --      Recent Labs      04/25/18 2237   BNPBTYPENAT  47              Assessment/Plan     * STEMI (ST elevation myocardial infarction) (CMS-[facility])- (present on admission)   Assessment & Plan    Patient is now status post cardiac catheterization with PTCA of the mid circumflex artery and also notable for multivessel disease. He was admitted to the ICU for continued monitoring of reperfusion arrhythmia and CT surgery consult.  Dr. Pike has evaluated him and plans on a CABG in the morning.   Check a lipid panel.   Crestor cozaar, metoprolol.   Cardiology is following.        HTN (hypertension)- (present on admission)   Assessment & Plan    Continue home Cozaar and metoprolol. We'll add when necessary hydralazine for better blood pressure control.          Quality-Core Measures   Reviewed items::  Labs reviewed, Radiology images  reviewed and Medications reviewed  Bravo catheter::  No Bravo

## 2018-04-26 NOTE — ASSESSMENT & PLAN NOTE
Continue home Cozaar and metoprolol. We'll add when necessary hydralazine for better blood pressure control.

## 2018-04-26 NOTE — CONSULTS
Cardiology Consult Note    DOS: 4/25/2017    Consulting physician: Daniele Gomez MD    Chief complaint/Reason for consult: STEMI    HPI:  Patient is a 47 yo  with a history of HTN (on valsartan and metoprolol), who was in his usual state of health until about 2 days ago. Began to have waxing/waning chest pain. Went away after about 20 minutes each time. Earlier this evening after a meal he had chest pain, substernal, 6/10 in severity, would not go away. Was seen at [facility] where he was found to have LBBB (but with ST elevation lateral precordial leads). I was called and recommended lytics. Subsequently had resolution of his LBBB showing multiple territories down including lateral, posterior, inferior. Pain resolved after lytics and he was brought in by ambulance. Subsequent EKG still showing deep symmetrical TWI laterally with intermittent LBBB. Hemodynamically stable.    ROS (+ highlighted in red):  Constitutional: Fevers/chills/fatigue/weightloss  HEENT: Blurry vision/eye pain/sore throat/hearing loss  Respiratory: Shortness of breath/cough  Cardiovascular: Chest pain/palpitations/edema/orthopnea/syncope  GI: Nausea/vomitting/diarrhea  MSK: Arthralgias/myagias/muscle weakness  Skin: Rash/sores  Neurological: Numbness/tremors/vertigo  Endocrine: Excessive thirst/polyuria/cold intolerance/heat intolerance  Psych: Depression/anxiety    PMHx:  HTN    No past surgical history on file.    Social History     Social History   • Marital status: N/A     Spouse name: N/A   • Number of children: N/A   • Years of education: N/A     Occupational History   • Not on file.     Social History Main Topics   • Smoking status: Not on file   • Smokeless tobacco: Not on file   • Alcohol use Not on file   • Drug use: Unknown   • Sexual activity: Not on file     Other Topics Concern   • Not on file     Social History Narrative   • No narrative on file       No family history on file.    Allergies not on  file    Current Facility-Administered Medications   Medication Dose Route Frequency Provider Last Rate Last Dose   • HEPARIN 1000 UNITS/ML OR USE ONLY            • LIDOCAINE HCL 2 % INJ SOLN            • NITROGLYCERIN 2 MG IV SOLN            • MIDAZOLAM HCL 2 MG/2ML INJ SOLN            • FENTANYL CITRATE (PF) 0.05 MG/ML INJ SOLN              No current outpatient prescriptions on file.       Physical Exam:  There were no vitals filed for this visit.  General appearance: NAD, conversant   Eyes: anicteric sclerae, moist conjunctivae; no lid-lag; PERRLA  HENT: Atraumatic; oropharynx clear with moist mucous membranes and no mucosal ulcerations; normal hard and soft palate  Neck: Trachea midline; FROM, supple, no thyromegaly or lymphadenopathy  Lungs: CTA, with normal respiratory effort and no intercostal retractions  CV: RRR, no MRGs, no JVD   Abdomen: Soft, non-tender; no masses or HSM  Extremities: No peripheral edema or extremity lymphadenopathy  Skin: Normal temperature, turgor and texture; no rash, ulcers or subcutaneous nodules  Psych: Appropriate affect, alert and oriented to person, place and time    Data:  Labs reviewed    Prior echo/stress results reviewed:    CXR interpreted by me:  WNL    EKG interpreted by me:   Sinus, lateral TWI    Impression/Plan:  1)STEMI  2)S/p lytics  3)LBBB  4)HTN    -Patient is currently pain free  -But still going in/out of LBBB and has still ischemic changes on EKG  -Furthermore had large amount of myocardium involved in his initial STEMI ekg  -For these reasons, I think it is reasonable to define his coronary anatomy and possibly get definitive therapy sooner rather than later  -LHC and angiogram discussed with him, will proceed with urgent coronary catheterization    Lourdes Boone MD

## 2018-04-26 NOTE — PROGRESS NOTES
"Patient complaining of a \"headache\" and a generalized \"aching\" feeling. Pain is a 2-3/10. States he's had headaches and usually takes aspirin at home. No PRN pain medication besides morphine for chest pain. Dr. Ramirez notified. No new orders at this time.   "

## 2018-04-26 NOTE — PROGRESS NOTES
Tele summary  Rhythm: SR/ SB with intermittent bundle branch block  Rate: 40's-60's  NV: 0.16  QRS: 0.14  QT: 0.52    Ectopy: occasional to frequent PVC    Telemetry strips placed in pt chart.     12 hour chart check.

## 2018-04-26 NOTE — PROGRESS NOTES
Plan for CABGx3-4, EVH, possible bilateral ELIANE harvest tomorrow at 8am. Will order echo, vein mapping, carotid U/S, and RUQ abdominal ultrasound given elevated LFTs.

## 2018-04-26 NOTE — NON-PROVIDER
"CC: \"chest pain radiate to both arms\"    HPI: A 48 year old male is transfer from outside hospital is presented to the ER for the evaluation of chest pain and SOB. Patient lives in [place] and is a long-. He states that on Monday he had been unloading a truck in [place] when he began to have chest pain burning in nature and radiate to right arm. He took ibuprofen and his BP medications, but did not seek immediate medical attention as the pain went away. On Tuesday he experienced the same pain. Further patient mentioned that His chest pain returned on Wednesday  when he was driving his truck back to [place]. Pain was described as centralized, radiated to both arms, with no alleviating factors, aggravated with any exertion and an intensity of 5 out of 10.  He presented himself to an outside facility where he was given NG and Lidocaine and said he got some relief from chest pain. Cardiology had been consulted by the outside hospital and he was then transferred to [facility] ER for higher level of care.    ROS: Negative except mentioned above in the HPI.    Past medical history: Hypertension    Medications:   • HEPARIN 1000 UNITS/ML OR USE ONLY               • LIDOCAINE HCL 2 % INJ SOLN               • NITROGLYCERIN 2 MG IV SOLN               • MIDAZOLAM HCL 2 MG/2ML INJ SOLN               • FENTANYL CITRATE (PF) 0.05 MG/ML INJ SOLN           Past Surgical History:   Procedure Laterality Date   • OTHER ORTHOPEDIC SURGERY       Family History   Problem Relation Age of Onset   • Heart Disease Father     • Hypertension Father     • Heart Disease Maternal Grandmother     • Hypertension Maternal Grandmother     • Heart Disease Paternal Grandfather     • Hypertension Paternal Grandfather      Allergies: NKDA    Social History   Substance Use Topics   • Smoking status: Not on file   • Smokeless tobacco: Not on file   • Alcohol use Not on file     Physical Exam   Temp (24hrs), Av.8 °C (98.3 °F), Min:36.8 °C " (98.2 °F), Max:36.8 °C (98.3 °F) BP-       Pulse  Av.5  Min: 48  Max: 70    Constitutional: He is oriented to person, place, and time. No distress.   HENT:   Head: Normocephalic and atraumatic.   Right Ear: External ear normal.   Left Ear: External ear normal.   Eyes: EOM are normal. Right eye exhibits no discharge. Left eye exhibits no discharge.   Neck: Neck supple. No JVD present.   Cardiovascular: Normal rate, regular rhythm and normal heart sounds.    Pulmonary/Chest: Effort normal and breath sounds normal. No respiratory distress. He exhibits no tenderness.   Abdominal: Soft. Bowel sounds are normal. He exhibits no distension. There is no tenderness.   Musculoskeletal: He exhibits no edema.   Neurological: He is alert and oriented to person, place, and time. No cranial nerve deficit.   Skin: Skin is dry. He is not diaphoretic. No erythema.   Psychiatric: He has a normal mood and affect. His behavior is normal.     Labs:    Ref. Range 2018 00:59 2018 01:10 2018 03:30 2018 04:56 2018 07:40   Troponin I Latest Ref Range: 0.00 - 0.04 ng/mL   >50.00 (H)       EKG:  SINUS BRADYCARDIA   LEFT BUNDLE BRANCH BLOCK   Compared to ECG 2018 00:59:43   Left bundle-branch block now present   Left-axis deviation no longer present   T-wave abnormality no longer present   Possible ischemia no longer present    Assessment and plan:   1. STEMI: PCTA of the mid circumflex artery done on 2018   Continued to monitor in ICU for post PCTA arrhythmias   EKG and Troponin level will be monitored

## 2018-04-26 NOTE — DIETARY
Nutrition Services: Consult cardiac rehab diet education    Pt is a 48 y.o. Male with Dx: STEMI    PMH: HTN  BMI= 32.37  HA1c WNL; no lipid panel    Admit day 1.  Pt will be going for CABG tomorrow morning.   RD will see pt for diet education prior to D/c.

## 2018-04-26 NOTE — PROGRESS NOTES
Spoke with Dr. Bernabe with cardiology. Orders to give morning dose of metoprolol and start a regular cardiac diet. NPO at midnight as patient is going down for OHS tomorrow.     Asked for parameters for metoprolol, none given. Will monitor.

## 2018-04-26 NOTE — CATH LAB
Immediate Post-Operative Note      PreOp Diagnosis: STEMI    PostOp Diagnosis: same    Procedure(s) :  Coronary Angiography, Left Heart Catheterization, Left Ventriculography.  PTCA of occluded mid Circ    Surgeon(s):  Leighton Cook M.D.    Type of Anesthesia: Moderate Sedation    Specimen: None    Estimated Blood Loss: <10 CC cc's    Contrast Media:  146 cc's    Fluoro Time: 4.5 min    Xray DAP: 52776    Findings: 100% mid Circ treated with PTCA with good result.  >95% mid LAD. 90% LADD1.   90% OM1. Sequential 75% and 80% RCA lesions  .EF 50%, LVEDP >30.  Complications: none      Leighton Cook M.D.  4/25/2018 11:38 PM

## 2018-04-26 NOTE — ASSESSMENT & PLAN NOTE
Patient is now status post cardiac catheterization with PTCA of the mid circumflex artery and also notable for multivessel disease. He was admitted to the ICU for continued monitoring of reperfusion arrhythmia and CT surgery consult.  Dr. Pike has evaluated him and plans on a CABG in the morning.   Check a lipid panel.   Crestor, cozaar, metoprolol.   Cardiology is following.

## 2018-04-26 NOTE — CARE PLAN
Problem: Pre Op  Goal: Optimal preparation for CABG/Heart Valve surgery    Intervention: Pre Op education to patient/significant other. Provide patient [facility] Patient Guideline for Cardiac Surgery (See Pt. Ed.)  Discussed anatomy and physiology of cardiac surgery with patient and family to include pre-op regimen. Reviewed post-op expectations to include  the use of incentive spirometry with return demonstration, ventilator management, cardiac monitoring, tubes and drains, early ambulation, and expected length of stay. Also provided information on Cardiac Rehab and how to schedule an appointment. Patient and family state full understanding of all information given.  Intervention: Baseline assessment documented to include IS volume, weight, bilateral BP and peripheral pulses.  2250 ml  Intervention: NPO at midnight except cardiac medications. (No ASA, coumadin or Plavix)  Instructed patient nothing to eat or drink after midnight the night prior to scheduled surgery date.  Intervention: Shower with chlorhexidine x 2  Instructed patient to wash entire body with chlorhexedine wipes prior to bedtime the night before surgery.

## 2018-04-26 NOTE — CARE PLAN
Problem: Infection  Goal: Will remain free from infection  Outcome: PROGRESSING AS EXPECTED  Standard precautions in place with every patient interaction. Frequent handwashing and foaming utilized to prevent spread of infection to patient or staff members.    Intervention: Assess signs and symptoms of infection  Complete  Intervention: Implement standard precautions and perform hand washing before and after patient contact  Complete  Intervention: Assess for removal of potential routes of infection, such as IV, central line, intra-arterial or urinary catheters  Complete      Problem: Pain Management  Goal: Pain level will decrease to patient's comfort goal  Outcome: PROGRESSING AS EXPECTED  0-10 pain scale ulitized to assess and monitor pt pain. Medication provided PRN and effectively relieved pain.

## 2018-04-26 NOTE — DISCHARGE PLANNING
Medical Social Work    Referral: STEMI    Intervention: MSW responded to trauma bay.  Pt is a 48 year old male brought in by [facility] from [facility] in [place], [place].  Pt is Jae Venegas (: 1970).  Pt is a  from out of town.  Pt has already contacted his family about hospitalization.  Pt will be going to cath lab per Cardiology.  Pt states that his emergency contacts are: Jose Venegas (father) [phone] and Linh Venegas (mother) [phone].      Plan: SW will follow as needed.

## 2018-04-26 NOTE — H&P
Hospital Medicine History and Physical    Date of Service  4/25/2018    Chief Complaint  Transfer from outside hospital for STEMI    History of Presenting Illness  48 y.o. male who presented on 4/25/2018 in transfer from an outside facility for a STEMI. Patient lives in [place] and is a long-. He states that he left [place] on Tuesday and had been changing a tire when he began to have chest discomfort. Initially thought that he had pulled a muscle and did not seek immediate medical attention. His chest pain returned when he began to unload his truck in [place]. It was described as centralized, radiated to both arms, with no alleviating factors, aggravated with any exertion and an intensity of 6 out of 10. On his return trip, he became exceptionally short of breath at the higher altitude and also had return of his chest pain. When he arrived in [place], he had begun to feel better but then his chest pain returned. He presented himself to an outside facility where he was noted to have a left bundle branch block with ST elevations in the precordial leads. Cardiology had been consulted by the outside hospital and lidocaine therapy was recommended. He was then transferred to our facility for higher level of care and was emergently taken to the cardiac Cath Lab.   Primary Care Physician  No primary care provider on file.    Consultants  Dr. lopes, cardiology    Code Status  Full    Review of Systems  Review of Systems   Constitutional: Negative for chills and fever.   HENT: Negative for congestion and sore throat.    Eyes: Negative for photophobia.   Respiratory: Positive for shortness of breath. Negative for cough and wheezing.    Cardiovascular: Positive for chest pain. Negative for palpitations.   Gastrointestinal: Negative for abdominal pain, diarrhea, nausea and vomiting.   Genitourinary: Negative for dysuria.   Musculoskeletal: Negative for myalgias.   Skin: Negative.    Neurological: Negative for  dizziness, tingling, focal weakness and headaches.   Psychiatric/Behavioral: Negative for depression and suicidal ideas.        Past Medical History  Hypertension    Surgical History  Past Surgical History:   Procedure Laterality Date   • OTHER ORTHOPEDIC SURGERY         Medications  No current facility-administered medications on file prior to encounter.      No current outpatient prescriptions on file prior to encounter.       Family History  Family History   Problem Relation Age of Onset   • Heart Disease Father    • Hypertension Father    • Heart Disease Maternal Grandmother    • Hypertension Maternal Grandmother    • Heart Disease Paternal Grandfather    • Hypertension Paternal Grandfather        Social History  Social History   Substance Use Topics   • Smoking status: Not on file   • Smokeless tobacco: Not on file   • Alcohol use Not on file       Allergies  Allergies not on file     Physical Exam  Laboratory   Hemodynamics  Temp (24hrs), Av.8 °C (98.3 °F), Min:36.8 °C (98.2 °F), Max:36.8 °C (98.3 °F)      Pulse  Av.5  Min: 48  Max: 70           Respiratory                    Physical Exam   Constitutional: He is oriented to person, place, and time. No distress.   HENT:   Head: Normocephalic and atraumatic.   Right Ear: External ear normal.   Left Ear: External ear normal.   Eyes: EOM are normal. Right eye exhibits no discharge. Left eye exhibits no discharge.   Neck: Neck supple. No JVD present.   Cardiovascular: Normal rate, regular rhythm and normal heart sounds.    Pulmonary/Chest: Effort normal and breath sounds normal. No respiratory distress. He exhibits no tenderness.   Abdominal: Soft. Bowel sounds are normal. He exhibits no distension. There is no tenderness.   Musculoskeletal: He exhibits no edema.   Neurological: He is alert and oriented to person, place, and time. No cranial nerve deficit.   Skin: Skin is dry. He is not diaphoretic. No erythema.   Psychiatric: He has a normal mood and  affect. His behavior is normal.   Nursing note and vitals reviewed.    Capillary refill less than 3 seconds, distal pulses intact    Recent Labs      04/25/18 2237   WBC  11.7*   RBC  4.78   HEMOGLOBIN  14.5   HEMATOCRIT  43.0   MCV  90.0   MCH  30.3   MCHC  33.7   RDW  41.1   PLATELETCT  145*   MPV  11.2         No results for input(s): ALTSGPT, ASTSGOT, ALKPHOSPHAT, TBILIRUBIN, DBILIRUBIN, GAMMAGT, AMYLASE, LIPASE, ALB, PREALBUMIN, GLUCOSE in the last 72 hours.  Recent Labs      04/25/18 2237   APTT  29.1   INR  1.01             No results found for: TROPONINI    Imaging  Dx-chest-limited (1 View)    Result Date: 4/25/2018 4/25/2018 10:48 PM HISTORY/REASON FOR EXAM:  Chest Pain TECHNIQUE/EXAM DESCRIPTION AND NUMBER OF VIEWS: Single portable view of the chest. COMPARISON: None FINDINGS: Low lung volume. Diffuse interstitial prominence. No pleural effusion. No pneumothorax. Mildly prominent cardiopericardial silhouette.     Low lung volume. Diffuse interstitial prominence could relate to mild edema. Mild cardiomegaly.     Assessment/Plan     Anticipate that patient will need greater than 2 midnights for management of the discussed medical issues.    * STEMI (ST elevation myocardial infarction) (CMS-[facility])   Assessment & Plan    Patient is now status post cardiac catheterization with PTCA of the mid circumflex artery and also notable for multivessel disease. He will be admitted to the ICU for continued monitoring of reperfusion arrhythmia and additional specialist consultation for possible surgical intervention. He'll be monitored closely on telemetry and I will obtain serial EKGs as well as troponin levels. He has been started on a heparin drip in addition to aspirin and a statin. I will order an echocardiogram. I will check a TSH and a lipid panel. Cardiology is following.        HTN (hypertension)   Assessment & Plan    Continue home Cozaar and metoprolol. We'll add when necessary hydralazine for better blood  pressure control.          Prophylaxis: Patient is on heparin, no additional need for DVT prophylaxis, no PPI indicated, bowel protocol

## 2018-04-27 ENCOUNTER — APPOINTMENT (OUTPATIENT)
Dept: RADIOLOGY | Facility: MEDICAL CENTER | Age: 48
DRG: 231 | End: 2018-04-27
Attending: INTERNAL MEDICINE
Payer: COMMERCIAL

## 2018-04-27 ENCOUNTER — APPOINTMENT (OUTPATIENT)
Dept: RADIOLOGY | Facility: MEDICAL CENTER | Age: 48
DRG: 231 | End: 2018-04-27
Attending: THORACIC SURGERY (CARDIOTHORACIC VASCULAR SURGERY)
Payer: COMMERCIAL

## 2018-04-27 LAB
ACT BLD: 120 SEC (ref 74–137)
ACT BLD: 125 SEC (ref 74–137)
ACT BLD: 499 SEC (ref 74–137)
ACT BLD: 543 SEC (ref 74–137)
ACT BLD: 543 SEC (ref 74–137)
ACT BLD: 577 SEC (ref 74–137)
ACT BLD: 582 SEC (ref 74–137)
ACT BLD: 698 SEC (ref 74–137)
ACTION RANGE TRIGGERED IACRT: NO
ACTION RANGE TRIGGERED IACRT: YES
ALBUMIN SERPL BCP-MCNC: 3.2 G/DL (ref 3.2–4.9)
ALBUMIN/GLOB SERPL: 1.6 G/DL
ALP SERPL-CCNC: 59 U/L (ref 30–99)
ALT SERPL-CCNC: 55 U/L (ref 2–50)
ANION GAP SERPL CALC-SCNC: 15 MMOL/L (ref 0–11.9)
ANION GAP SERPL CALC-SCNC: 23 MMOL/L (ref 0–11.9)
APTT PPP: 32.4 SEC (ref 24.7–36)
AST SERPL-CCNC: 107 U/L (ref 12–45)
BASE EXCESS BLDA CALC-SCNC: -1 MMOL/L (ref -4–3)
BASE EXCESS BLDA CALC-SCNC: -10 MMOL/L (ref -4–3)
BASE EXCESS BLDA CALC-SCNC: -11 MMOL/L (ref -4–3)
BASE EXCESS BLDA CALC-SCNC: -11 MMOL/L (ref -4–3)
BASE EXCESS BLDA CALC-SCNC: -12 MMOL/L (ref -4–3)
BASE EXCESS BLDA CALC-SCNC: -3 MMOL/L (ref -4–3)
BASE EXCESS BLDA CALC-SCNC: -4 MMOL/L (ref -4–3)
BASE EXCESS BLDA CALC-SCNC: -7 MMOL/L (ref -4–3)
BASE EXCESS BLDA CALC-SCNC: -8 MMOL/L (ref -4–3)
BASE EXCESS BLDA CALC-SCNC: -8 MMOL/L (ref -4–3)
BASE EXCESS BLDA CALC-SCNC: -9 MMOL/L (ref -4–3)
BASE EXCESS BLDA CALC-SCNC: 1 MMOL/L (ref -4–3)
BASE EXCESS BLDA CALC-SCNC: 2 MMOL/L (ref -4–3)
BASE EXCESS BLDA CALC-SCNC: 5 MMOL/L (ref -4–3)
BASE EXCESS BLDV CALC-SCNC: 1 MMOL/L (ref -4–3)
BASOPHILS # BLD AUTO: 0 % (ref 0–1.8)
BASOPHILS # BLD: 0 K/UL (ref 0–0.12)
BILIRUB SERPL-MCNC: 4.5 MG/DL (ref 0.1–1.5)
BODY TEMPERATURE: ABNORMAL DEGREES
BUN SERPL-MCNC: 14 MG/DL (ref 8–22)
BUN SERPL-MCNC: 16 MG/DL (ref 8–22)
CA-I BLD ISE-SCNC: 0.98 MMOL/L (ref 1.1–1.3)
CA-I BLD ISE-SCNC: 1 MMOL/L (ref 1.1–1.3)
CA-I BLD ISE-SCNC: 1.02 MMOL/L (ref 1.1–1.3)
CA-I BLD ISE-SCNC: 1.02 MMOL/L (ref 1.1–1.3)
CA-I BLD ISE-SCNC: 1.13 MMOL/L (ref 1.1–1.3)
CA-I BLD ISE-SCNC: 1.13 MMOL/L (ref 1.1–1.3)
CA-I BLD ISE-SCNC: 1.16 MMOL/L (ref 1.1–1.3)
CA-I BLD ISE-SCNC: 1.16 MMOL/L (ref 1.1–1.3)
CALCIUM SERPL-MCNC: 7.5 MG/DL (ref 8.5–10.5)
CALCIUM SERPL-MCNC: 7.7 MG/DL (ref 8.5–10.5)
CHLORIDE SERPL-SCNC: 103 MMOL/L (ref 96–112)
CHLORIDE SERPL-SCNC: 105 MMOL/L (ref 96–112)
CHOLEST SERPL-MCNC: 142 MG/DL (ref 100–199)
CO2 BLDA-SCNC: 15 MMOL/L (ref 20–33)
CO2 BLDA-SCNC: 16 MMOL/L (ref 20–33)
CO2 BLDA-SCNC: 17 MMOL/L (ref 20–33)
CO2 BLDA-SCNC: 18 MMOL/L (ref 20–33)
CO2 BLDA-SCNC: 20 MMOL/L (ref 20–33)
CO2 BLDA-SCNC: 20 MMOL/L (ref 20–33)
CO2 BLDA-SCNC: 22 MMOL/L (ref 20–33)
CO2 BLDA-SCNC: 25 MMOL/L (ref 20–33)
CO2 BLDA-SCNC: 26 MMOL/L (ref 20–33)
CO2 BLDA-SCNC: 28 MMOL/L (ref 20–33)
CO2 BLDA-SCNC: 31 MMOL/L (ref 20–33)
CO2 BLDV-SCNC: 27 MMOL/L (ref 20–33)
CO2 SERPL-SCNC: 14 MMOL/L (ref 20–33)
CO2 SERPL-SCNC: 19 MMOL/L (ref 20–33)
CREAT SERPL-MCNC: 1.04 MG/DL (ref 0.5–1.4)
CREAT SERPL-MCNC: 1.41 MG/DL (ref 0.5–1.4)
EKG IMPRESSION: NORMAL
EOSINOPHIL # BLD AUTO: 0 K/UL (ref 0–0.51)
EOSINOPHIL NFR BLD: 0 % (ref 0–6.9)
ERYTHROCYTE [DISTWIDTH] IN BLOOD BY AUTOMATED COUNT: 39.7 FL (ref 35.9–50)
GLOBULIN SER CALC-MCNC: 2 G/DL (ref 1.9–3.5)
GLUCOSE BLD-MCNC: 100 MG/DL (ref 65–99)
GLUCOSE BLD-MCNC: 123 MG/DL (ref 65–99)
GLUCOSE BLD-MCNC: 130 MG/DL (ref 65–99)
GLUCOSE BLD-MCNC: 144 MG/DL (ref 65–99)
GLUCOSE BLD-MCNC: 157 MG/DL (ref 65–99)
GLUCOSE BLD-MCNC: 160 MG/DL (ref 65–99)
GLUCOSE BLD-MCNC: 176 MG/DL (ref 65–99)
GLUCOSE BLD-MCNC: 86 MG/DL (ref 65–99)
GLUCOSE SERPL-MCNC: 199 MG/DL (ref 65–99)
GLUCOSE SERPL-MCNC: 249 MG/DL (ref 65–99)
HCO3 BLDA-SCNC: 14.1 MMOL/L (ref 17–25)
HCO3 BLDA-SCNC: 14.9 MMOL/L (ref 17–25)
HCO3 BLDA-SCNC: 15.1 MMOL/L (ref 17–25)
HCO3 BLDA-SCNC: 15.5 MMOL/L (ref 17–25)
HCO3 BLDA-SCNC: 16.3 MMOL/L (ref 17–25)
HCO3 BLDA-SCNC: 16.8 MMOL/L (ref 17–25)
HCO3 BLDA-SCNC: 18.5 MMOL/L (ref 17–25)
HCO3 BLDA-SCNC: 18.8 MMOL/L (ref 17–25)
HCO3 BLDA-SCNC: 20.8 MMOL/L (ref 17–25)
HCO3 BLDA-SCNC: 23.3 MMOL/L (ref 17–25)
HCO3 BLDA-SCNC: 24.4 MMOL/L (ref 17–25)
HCO3 BLDA-SCNC: 26.7 MMOL/L (ref 17–25)
HCO3 BLDA-SCNC: 26.8 MMOL/L (ref 17–25)
HCO3 BLDA-SCNC: 29.4 MMOL/L (ref 17–25)
HCO3 BLDV-SCNC: 25.6 MMOL/L (ref 24–28)
HCT VFR BLD AUTO: 41.1 % (ref 42–52)
HCT VFR BLD CALC: 28 % (ref 42–52)
HCT VFR BLD CALC: 29 % (ref 42–52)
HCT VFR BLD CALC: 30 % (ref 42–52)
HCT VFR BLD CALC: 31 % (ref 42–52)
HCT VFR BLD CALC: 31 % (ref 42–52)
HCT VFR BLD CALC: 32 % (ref 42–52)
HCT VFR BLD CALC: 33 % (ref 42–52)
HCT VFR BLD CALC: 36 % (ref 42–52)
HCT VFR BLD CALC: 37 % (ref 42–52)
HCT VFR BLD CALC: 37 % (ref 42–52)
HCT VFR BLD CALC: 39 % (ref 42–52)
HDLC SERPL-MCNC: 32 MG/DL
HGB BLD-MCNC: 10.2 G/DL (ref 14–18)
HGB BLD-MCNC: 10.5 G/DL (ref 14–18)
HGB BLD-MCNC: 10.5 G/DL (ref 14–18)
HGB BLD-MCNC: 10.9 G/DL (ref 14–18)
HGB BLD-MCNC: 11.2 G/DL (ref 14–18)
HGB BLD-MCNC: 12.2 G/DL (ref 14–18)
HGB BLD-MCNC: 12.6 G/DL (ref 14–18)
HGB BLD-MCNC: 12.6 G/DL (ref 14–18)
HGB BLD-MCNC: 13.3 G/DL (ref 14–18)
HGB BLD-MCNC: 14.5 G/DL (ref 14–18)
HGB BLD-MCNC: 9.5 G/DL (ref 14–18)
HGB BLD-MCNC: 9.9 G/DL (ref 14–18)
INR PPP: 1.27 (ref 0.87–1.13)
INST. QUALIFIED PATIENT IIQPT: YES
LDLC SERPL CALC-MCNC: 95 MG/DL
LYMPHOCYTES # BLD AUTO: 3.53 K/UL (ref 1–4.8)
LYMPHOCYTES NFR BLD: 12.2 % (ref 22–41)
MAGNESIUM SERPL-MCNC: 2.5 MG/DL (ref 1.5–2.5)
MANUAL DIFF BLD: NORMAL
MCH RBC QN AUTO: 31 PG (ref 27–33)
MCHC RBC AUTO-ENTMCNC: 35.3 G/DL (ref 33.7–35.3)
MCV RBC AUTO: 88 FL (ref 81.4–97.8)
MONOCYTES # BLD AUTO: 3.76 K/UL (ref 0–0.85)
MONOCYTES NFR BLD AUTO: 13 % (ref 0–13.4)
MORPHOLOGY BLD-IMP: NORMAL
NEUTROPHILS # BLD AUTO: 21.62 K/UL (ref 1.82–7.42)
NEUTROPHILS NFR BLD: 74.8 % (ref 44–72)
NRBC # BLD AUTO: 0 K/UL
NRBC BLD-RTO: 0 /100 WBC
O2/TOTAL GAS SETTING VFR VENT: 100 %
O2/TOTAL GAS SETTING VFR VENT: 60 %
O2/TOTAL GAS SETTING VFR VENT: 70 %
O2/TOTAL GAS SETTING VFR VENT: 80 %
PCO2 BLDA: 30.8 MMHG (ref 26–37)
PCO2 BLDA: 31.4 MMHG (ref 26–37)
PCO2 BLDA: 31.6 MMHG (ref 26–37)
PCO2 BLDA: 31.8 MMHG (ref 26–37)
PCO2 BLDA: 33.6 MMHG (ref 26–37)
PCO2 BLDA: 34.3 MMHG (ref 26–37)
PCO2 BLDA: 35.8 MMHG (ref 26–37)
PCO2 BLDA: 39.5 MMHG (ref 26–37)
PCO2 BLDA: 39.8 MMHG (ref 26–37)
PCO2 BLDA: 40.2 MMHG (ref 26–37)
PCO2 BLDA: 41.7 MMHG (ref 26–37)
PCO2 BLDA: 42.2 MMHG (ref 26–37)
PCO2 BLDA: 42.2 MMHG (ref 26–37)
PCO2 BLDA: 42.9 MMHG (ref 26–37)
PCO2 BLDA: 43.7 MMHG (ref 26–37)
PCO2 BLDA: 45 MMHG (ref 26–37)
PCO2 BLDV: 40 MMHG (ref 41–51)
PCO2 TEMP ADJ BLDA: 31.6 MMHG (ref 26–37)
PCO2 TEMP ADJ BLDA: 31.8 MMHG (ref 26–37)
PCO2 TEMP ADJ BLDA: 32.5 MMHG (ref 26–37)
PCO2 TEMP ADJ BLDA: 32.6 MMHG (ref 26–37)
PCO2 TEMP ADJ BLDA: 33.2 MMHG (ref 26–37)
PCO2 TEMP ADJ BLDA: 34.5 MMHG (ref 26–37)
PCO2 TEMP ADJ BLDA: 36.9 MMHG (ref 26–37)
PCO2 TEMP ADJ BLDA: 37.3 MMHG (ref 26–37)
PCO2 TEMP ADJ BLDA: 38.2 MMHG (ref 26–37)
PCO2 TEMP ADJ BLDA: 39.8 MMHG (ref 26–37)
PCO2 TEMP ADJ BLDA: 41.7 MMHG (ref 26–37)
PCO2 TEMP ADJ BLDA: 42.2 MMHG (ref 26–37)
PCO2 TEMP ADJ BLDA: 42.2 MMHG (ref 26–37)
PCO2 TEMP ADJ BLDA: 42.9 MMHG (ref 26–37)
PCO2 TEMP ADJ BLDA: 43.7 MMHG (ref 26–37)
PCO2 TEMP ADJ BLDA: 45 MMHG (ref 26–37)
PCO2 TEMP ADJ BLDV: 40 MMHG (ref 41–51)
PH BLDA: 7.24 [PH] (ref 7.4–7.5)
PH BLDA: 7.26 [PH] (ref 7.4–7.5)
PH BLDA: 7.26 [PH] (ref 7.4–7.5)
PH BLDA: 7.27 [PH] (ref 7.4–7.5)
PH BLDA: 7.29 [PH] (ref 7.4–7.5)
PH BLDA: 7.3 [PH] (ref 7.4–7.5)
PH BLDA: 7.32 [PH] (ref 7.4–7.5)
PH BLDA: 7.32 [PH] (ref 7.4–7.5)
PH BLDA: 7.34 [PH] (ref 7.4–7.5)
PH BLDA: 7.36 [PH] (ref 7.4–7.5)
PH BLDA: 7.37 [PH] (ref 7.4–7.5)
PH BLDA: 7.39 [PH] (ref 7.4–7.5)
PH BLDA: 7.41 [PH] (ref 7.4–7.5)
PH BLDA: 7.42 [PH] (ref 7.4–7.5)
PH BLDA: 7.43 [PH] (ref 7.4–7.5)
PH BLDA: 7.45 [PH] (ref 7.4–7.5)
PH BLDV: 7.42 [PH] (ref 7.31–7.45)
PH TEMP ADJ BLDA: 7.24 [PH] (ref 7.4–7.5)
PH TEMP ADJ BLDA: 7.26 [PH] (ref 7.4–7.5)
PH TEMP ADJ BLDA: 7.27 [PH] (ref 7.4–7.5)
PH TEMP ADJ BLDA: 7.29 [PH] (ref 7.4–7.5)
PH TEMP ADJ BLDA: 7.29 [PH] (ref 7.4–7.5)
PH TEMP ADJ BLDA: 7.3 [PH] (ref 7.4–7.5)
PH TEMP ADJ BLDA: 7.31 [PH] (ref 7.4–7.5)
PH TEMP ADJ BLDA: 7.32 [PH] (ref 7.4–7.5)
PH TEMP ADJ BLDA: 7.34 [PH] (ref 7.4–7.5)
PH TEMP ADJ BLDA: 7.36 [PH] (ref 7.4–7.5)
PH TEMP ADJ BLDA: 7.36 [PH] (ref 7.4–7.5)
PH TEMP ADJ BLDA: 7.39 [PH] (ref 7.4–7.5)
PH TEMP ADJ BLDA: 7.41 [PH] (ref 7.4–7.5)
PH TEMP ADJ BLDA: 7.42 [PH] (ref 7.4–7.5)
PH TEMP ADJ BLDA: 7.43 [PH] (ref 7.4–7.5)
PH TEMP ADJ BLDA: 7.45 [PH] (ref 7.4–7.5)
PH TEMP ADJ BLDV: 7.42 [PH] (ref 7.31–7.45)
PLATELET # BLD AUTO: 195 K/UL (ref 164–446)
PLATELET BLD QL SMEAR: NORMAL
PMV BLD AUTO: 10.3 FL (ref 9–12.9)
PO2 BLDA: 160 MMHG (ref 64–87)
PO2 BLDA: 170 MMHG (ref 64–87)
PO2 BLDA: 239 MMHG (ref 64–87)
PO2 BLDA: 241 MMHG (ref 64–87)
PO2 BLDA: 254 MMHG (ref 64–87)
PO2 BLDA: 319 MMHG (ref 64–87)
PO2 BLDA: 446 MMHG (ref 64–87)
PO2 BLDA: 69 MMHG (ref 64–87)
PO2 BLDA: 73 MMHG (ref 64–87)
PO2 BLDA: 75 MMHG (ref 64–87)
PO2 BLDA: 75 MMHG (ref 64–87)
PO2 BLDA: 77 MMHG (ref 64–87)
PO2 BLDA: 79 MMHG (ref 64–87)
PO2 BLDA: 79 MMHG (ref 64–87)
PO2 BLDA: 81 MMHG (ref 64–87)
PO2 BLDA: 82 MMHG (ref 64–87)
PO2 BLDV: 57 MMHG (ref 25–40)
PO2 TEMP ADJ BLDA: 160 MMHG (ref 64–87)
PO2 TEMP ADJ BLDA: 170 MMHG (ref 64–87)
PO2 TEMP ADJ BLDA: 239 MMHG (ref 64–87)
PO2 TEMP ADJ BLDA: 241 MMHG (ref 64–87)
PO2 TEMP ADJ BLDA: 254 MMHG (ref 64–87)
PO2 TEMP ADJ BLDA: 319 MMHG (ref 64–87)
PO2 TEMP ADJ BLDA: 446 MMHG (ref 64–87)
PO2 TEMP ADJ BLDA: 64 MMHG (ref 64–87)
PO2 TEMP ADJ BLDA: 72 MMHG (ref 64–87)
PO2 TEMP ADJ BLDA: 75 MMHG (ref 64–87)
PO2 TEMP ADJ BLDA: 76 MMHG (ref 64–87)
PO2 TEMP ADJ BLDA: 77 MMHG (ref 64–87)
PO2 TEMP ADJ BLDA: 80 MMHG (ref 64–87)
PO2 TEMP ADJ BLDA: 80 MMHG (ref 64–87)
PO2 TEMP ADJ BLDA: 82 MMHG (ref 64–87)
PO2 TEMP ADJ BLDA: 85 MMHG (ref 64–87)
PO2 TEMP ADJ BLDV: 57 MMHG (ref 25–40)
POTASSIUM BLD-SCNC: 3.1 MMOL/L (ref 3.6–5.5)
POTASSIUM BLD-SCNC: 3.4 MMOL/L (ref 3.6–5.5)
POTASSIUM BLD-SCNC: 3.6 MMOL/L (ref 3.6–5.5)
POTASSIUM BLD-SCNC: 3.6 MMOL/L (ref 3.6–5.5)
POTASSIUM BLD-SCNC: 5 MMOL/L (ref 3.6–5.5)
POTASSIUM BLD-SCNC: 5 MMOL/L (ref 3.6–5.5)
POTASSIUM BLD-SCNC: 5.6 MMOL/L (ref 3.6–5.5)
POTASSIUM BLD-SCNC: 5.7 MMOL/L (ref 3.6–5.5)
POTASSIUM SERPL-SCNC: 3 MMOL/L (ref 3.6–5.5)
POTASSIUM SERPL-SCNC: 3.4 MMOL/L (ref 3.6–5.5)
PROT SERPL-MCNC: 5.2 G/DL (ref 6–8.2)
PROTHROMBIN TIME: 15.6 SEC (ref 12–14.6)
RBC # BLD AUTO: 4.67 M/UL (ref 4.7–6.1)
RBC BLD AUTO: NORMAL
SAO2 % BLDA: 100 % (ref 93–99)
SAO2 % BLDA: 91 % (ref 93–99)
SAO2 % BLDA: 92 % (ref 93–99)
SAO2 % BLDA: 93 % (ref 93–99)
SAO2 % BLDA: 94 % (ref 93–99)
SAO2 % BLDA: 95 % (ref 93–99)
SAO2 % BLDA: 95 % (ref 93–99)
SAO2 % BLDA: 99 % (ref 93–99)
SAO2 % BLDA: 99 % (ref 93–99)
SAO2 % BLDV: 89 %
SODIUM BLD-SCNC: 131 MMOL/L (ref 135–145)
SODIUM BLD-SCNC: 134 MMOL/L (ref 135–145)
SODIUM BLD-SCNC: 135 MMOL/L (ref 135–145)
SODIUM BLD-SCNC: 135 MMOL/L (ref 135–145)
SODIUM BLD-SCNC: 137 MMOL/L (ref 135–145)
SODIUM BLD-SCNC: 138 MMOL/L (ref 135–145)
SODIUM BLD-SCNC: 138 MMOL/L (ref 135–145)
SODIUM BLD-SCNC: 142 MMOL/L (ref 135–145)
SODIUM SERPL-SCNC: 137 MMOL/L (ref 135–145)
SODIUM SERPL-SCNC: 142 MMOL/L (ref 135–145)
SPECIMEN DRAWN FROM PATIENT: ABNORMAL
TRIGL SERPL-MCNC: 77 MG/DL (ref 0–149)
WBC # BLD AUTO: 28.9 K/UL (ref 4.8–10.8)

## 2018-04-27 PROCEDURE — 93321 DOPPLER ECHO F-UP/LMTD STD: CPT

## 2018-04-27 PROCEDURE — 700111 HCHG RX REV CODE 636 W/ 250 OVERRIDE (IP): Performed by: INTERNAL MEDICINE

## 2018-04-27 PROCEDURE — 06BQ4ZZ EXCISION OF LEFT SAPHENOUS VEIN, PERCUTANEOUS ENDOSCOPIC APPROACH: ICD-10-PCS | Performed by: THORACIC SURGERY (CARDIOTHORACIC VASCULAR SURGERY)

## 2018-04-27 PROCEDURE — 5A1221Z PERFORMANCE OF CARDIAC OUTPUT, CONTINUOUS: ICD-10-PCS | Performed by: THORACIC SURGERY (CARDIOTHORACIC VASCULAR SURGERY)

## 2018-04-27 PROCEDURE — 700105 HCHG RX REV CODE 258

## 2018-04-27 PROCEDURE — 700111 HCHG RX REV CODE 636 W/ 250 OVERRIDE (IP): Performed by: NURSE PRACTITIONER

## 2018-04-27 PROCEDURE — C1729 CATH, DRAINAGE: HCPCS | Performed by: THORACIC SURGERY (CARDIOTHORACIC VASCULAR SURGERY)

## 2018-04-27 PROCEDURE — 160031 HCHG SURGERY MINUTES - 1ST 30 MINS LEVEL 5: Performed by: THORACIC SURGERY (CARDIOTHORACIC VASCULAR SURGERY)

## 2018-04-27 PROCEDURE — C1894 INTRO/SHEATH, NON-LASER: HCPCS | Performed by: THORACIC SURGERY (CARDIOTHORACIC VASCULAR SURGERY)

## 2018-04-27 PROCEDURE — 93325 DOPPLER ECHO COLOR FLOW MAPG: CPT

## 2018-04-27 PROCEDURE — A6402 STERILE GAUZE <= 16 SQ IN: HCPCS | Performed by: THORACIC SURGERY (CARDIOTHORACIC VASCULAR SURGERY)

## 2018-04-27 PROCEDURE — 500896 HCHG PACK, VASCULAR (FOR ROOM 10): Performed by: THORACIC SURGERY (CARDIOTHORACIC VASCULAR SURGERY)

## 2018-04-27 PROCEDURE — 110454 HCHG SHELL REV 250: Performed by: THORACIC SURGERY (CARDIOTHORACIC VASCULAR SURGERY)

## 2018-04-27 PROCEDURE — 160009 HCHG ANES TIME/MIN: Performed by: THORACIC SURGERY (CARDIOTHORACIC VASCULAR SURGERY)

## 2018-04-27 PROCEDURE — 700111 HCHG RX REV CODE 636 W/ 250 OVERRIDE (IP): Performed by: THORACIC SURGERY (CARDIOTHORACIC VASCULAR SURGERY)

## 2018-04-27 PROCEDURE — 110371 HCHG SHELL REV 272: Performed by: THORACIC SURGERY (CARDIOTHORACIC VASCULAR SURGERY)

## 2018-04-27 PROCEDURE — 93005 ELECTROCARDIOGRAM TRACING: CPT | Performed by: INTERNAL MEDICINE

## 2018-04-27 PROCEDURE — 93010 ELECTROCARDIOGRAM REPORT: CPT | Performed by: INTERNAL MEDICINE

## 2018-04-27 PROCEDURE — 502654 HCHG INSERT, OFF-PUMP: Performed by: THORACIC SURGERY (CARDIOTHORACIC VASCULAR SURGERY)

## 2018-04-27 PROCEDURE — 80053 COMPREHEN METABOLIC PANEL: CPT

## 2018-04-27 PROCEDURE — 502240 HCHG MISC OR SUPPLY RC 0272: Performed by: THORACIC SURGERY (CARDIOTHORACIC VASCULAR SURGERY)

## 2018-04-27 PROCEDURE — 83735 ASSAY OF MAGNESIUM: CPT

## 2018-04-27 PROCEDURE — 85007 BL SMEAR W/DIFF WBC COUNT: CPT

## 2018-04-27 PROCEDURE — 770022 HCHG ROOM/CARE - ICU (200)

## 2018-04-27 PROCEDURE — 02100Z8 BYPASS CORONARY ARTERY, ONE ARTERY FROM RIGHT INTERNAL MAMMARY, OPEN APPROACH: ICD-10-PCS | Performed by: THORACIC SURGERY (CARDIOTHORACIC VASCULAR SURGERY)

## 2018-04-27 PROCEDURE — 94002 VENT MGMT INPAT INIT DAY: CPT

## 2018-04-27 PROCEDURE — 71045 X-RAY EXAM CHEST 1 VIEW: CPT

## 2018-04-27 PROCEDURE — 82330 ASSAY OF CALCIUM: CPT | Mod: 91

## 2018-04-27 PROCEDURE — 160048 HCHG OR STATISTICAL LEVEL 1-5: Performed by: THORACIC SURGERY (CARDIOTHORACIC VASCULAR SURGERY)

## 2018-04-27 PROCEDURE — 02100Z9 BYPASS CORONARY ARTERY, ONE ARTERY FROM LEFT INTERNAL MAMMARY, OPEN APPROACH: ICD-10-PCS | Performed by: THORACIC SURGERY (CARDIOTHORACIC VASCULAR SURGERY)

## 2018-04-27 PROCEDURE — 82947 ASSAY GLUCOSE BLOOD QUANT: CPT | Mod: 91

## 2018-04-27 PROCEDURE — C1725 CATH, TRANSLUMIN NON-LASER: HCPCS | Performed by: THORACIC SURGERY (CARDIOTHORACIC VASCULAR SURGERY)

## 2018-04-27 PROCEDURE — 500371 HCHG DRAIN, BLAKE 10MM: Performed by: THORACIC SURGERY (CARDIOTHORACIC VASCULAR SURGERY)

## 2018-04-27 PROCEDURE — 700101 HCHG RX REV CODE 250: Performed by: NURSE PRACTITIONER

## 2018-04-27 PROCEDURE — 500767 HCHG KIT, ENDOSCOPIC VEIN HARVEST: Performed by: THORACIC SURGERY (CARDIOTHORACIC VASCULAR SURGERY)

## 2018-04-27 PROCEDURE — 37799 UNLISTED PX VASCULAR SURGERY: CPT

## 2018-04-27 PROCEDURE — 502000 HCHG MISC OR IMPLANTS RC 0278: Performed by: THORACIC SURGERY (CARDIOTHORACIC VASCULAR SURGERY)

## 2018-04-27 PROCEDURE — 501745 HCHG WIRE, SURGICAL STEEL: Performed by: THORACIC SURGERY (CARDIOTHORACIC VASCULAR SURGERY)

## 2018-04-27 PROCEDURE — 160042 HCHG SURGERY MINUTES - EA ADDL 1 MIN LEVEL 5: Performed by: THORACIC SURGERY (CARDIOTHORACIC VASCULAR SURGERY)

## 2018-04-27 PROCEDURE — 85027 COMPLETE CBC AUTOMATED: CPT

## 2018-04-27 PROCEDURE — 500002 HCHG ADHESIVE, DERMABOND: Performed by: THORACIC SURGERY (CARDIOTHORACIC VASCULAR SURGERY)

## 2018-04-27 PROCEDURE — 500312 HCHG CONNECTOR, BLAKE DRAIN 1-WAY: Performed by: THORACIC SURGERY (CARDIOTHORACIC VASCULAR SURGERY)

## 2018-04-27 PROCEDURE — 85347 COAGULATION TIME ACTIVATED: CPT | Mod: 91

## 2018-04-27 PROCEDURE — C1751 CATH, INF, PER/CENT/MIDLINE: HCPCS | Performed by: THORACIC SURGERY (CARDIOTHORACIC VASCULAR SURGERY)

## 2018-04-27 PROCEDURE — 503000 HCHG SUTURE, OHS: Performed by: THORACIC SURGERY (CARDIOTHORACIC VASCULAR SURGERY)

## 2018-04-27 PROCEDURE — 700105 HCHG RX REV CODE 258: Performed by: NURSE PRACTITIONER

## 2018-04-27 PROCEDURE — 85610 PROTHROMBIN TIME: CPT

## 2018-04-27 PROCEDURE — 500890 HCHG PACK, OPEN HEART: Performed by: THORACIC SURGERY (CARDIOTHORACIC VASCULAR SURGERY)

## 2018-04-27 PROCEDURE — 500385 HCHG DRAIN, PLEUROVAC ADUL: Performed by: THORACIC SURGERY (CARDIOTHORACIC VASCULAR SURGERY)

## 2018-04-27 PROCEDURE — 93005 ELECTROCARDIOGRAM TRACING: CPT | Performed by: NURSE PRACTITIONER

## 2018-04-27 PROCEDURE — 84295 ASSAY OF SERUM SODIUM: CPT | Mod: 91

## 2018-04-27 PROCEDURE — 82803 BLOOD GASES ANY COMBINATION: CPT | Mod: 91

## 2018-04-27 PROCEDURE — 501519 HCHG SUTURE, E PACK: Performed by: THORACIC SURGERY (CARDIOTHORACIC VASCULAR SURGERY)

## 2018-04-27 PROCEDURE — 500294 HCHG CLAMP, BULLDOG 1/2 FORCE BLUE: Performed by: THORACIC SURGERY (CARDIOTHORACIC VASCULAR SURGERY)

## 2018-04-27 PROCEDURE — P9047 ALBUMIN (HUMAN), 25%, 50ML: HCPCS

## 2018-04-27 PROCEDURE — 85014 HEMATOCRIT: CPT | Mod: 91

## 2018-04-27 PROCEDURE — 85730 THROMBOPLASTIN TIME PARTIAL: CPT

## 2018-04-27 PROCEDURE — 80061 LIPID PANEL: CPT

## 2018-04-27 PROCEDURE — 84132 ASSAY OF SERUM POTASSIUM: CPT | Mod: 91

## 2018-04-27 PROCEDURE — 500734 HCHG INSERT, STEALTH: Performed by: THORACIC SURGERY (CARDIOTHORACIC VASCULAR SURGERY)

## 2018-04-27 PROCEDURE — 80048 BASIC METABOLIC PNL TOTAL CA: CPT

## 2018-04-27 PROCEDURE — 700101 HCHG RX REV CODE 250

## 2018-04-27 PROCEDURE — A9270 NON-COVERED ITEM OR SERVICE: HCPCS | Performed by: NURSE PRACTITIONER

## 2018-04-27 PROCEDURE — 93312 ECHO TRANSESOPHAGEAL: CPT

## 2018-04-27 PROCEDURE — 500123 HCHG BOVIE, CONTROL W/BLADE: Performed by: THORACIC SURGERY (CARDIOTHORACIC VASCULAR SURGERY)

## 2018-04-27 PROCEDURE — 700111 HCHG RX REV CODE 636 W/ 250 OVERRIDE (IP)

## 2018-04-27 PROCEDURE — 700102 HCHG RX REV CODE 250 W/ 637 OVERRIDE(OP): Performed by: NURSE PRACTITIONER

## 2018-04-27 PROCEDURE — 82962 GLUCOSE BLOOD TEST: CPT | Mod: 91

## 2018-04-27 PROCEDURE — 021109W BYPASS CORONARY ARTERY, TWO ARTERIES FROM AORTA WITH AUTOLOGOUS VENOUS TISSUE, OPEN APPROACH: ICD-10-PCS | Performed by: THORACIC SURGERY (CARDIOTHORACIC VASCULAR SURGERY)

## 2018-04-27 PROCEDURE — 700105 HCHG RX REV CODE 258: Performed by: THORACIC SURGERY (CARDIOTHORACIC VASCULAR SURGERY)

## 2018-04-27 PROCEDURE — 503001 HCHG PERFUSION: Performed by: THORACIC SURGERY (CARDIOTHORACIC VASCULAR SURGERY)

## 2018-04-27 DEVICE — GLUE BIOGLUE 5CC PRE-FILL (5EA/PK - 4 TIPS PER SYRINGE): Type: IMPLANTABLE DEVICE | Status: FUNCTIONAL

## 2018-04-27 RX ORDER — OXYCODONE HYDROCHLORIDE 5 MG/1
5 TABLET ORAL
Status: DISCONTINUED | OUTPATIENT
Start: 2018-04-27 | End: 2018-05-08

## 2018-04-27 RX ORDER — MORPHINE SULFATE 4 MG/ML
4 INJECTION, SOLUTION INTRAMUSCULAR; INTRAVENOUS
Status: DISCONTINUED | OUTPATIENT
Start: 2018-04-27 | End: 2018-04-28

## 2018-04-27 RX ORDER — PAPAVERINE HYDROCHLORIDE 30 MG/ML
INJECTION INTRAMUSCULAR; INTRAVENOUS
Status: DISCONTINUED | OUTPATIENT
Start: 2018-04-27 | End: 2018-04-27 | Stop reason: HOSPADM

## 2018-04-27 RX ORDER — TRAMADOL HYDROCHLORIDE 50 MG/1
50 TABLET ORAL EVERY 4 HOURS PRN
Status: DISCONTINUED | OUTPATIENT
Start: 2018-04-27 | End: 2018-05-08 | Stop reason: HOSPADM

## 2018-04-27 RX ORDER — ENEMA 19; 7 G/133ML; G/133ML
1 ENEMA RECTAL
Status: DISCONTINUED | OUTPATIENT
Start: 2018-04-27 | End: 2018-05-05

## 2018-04-27 RX ORDER — MIDAZOLAM HYDROCHLORIDE 1 MG/ML
.5-2 INJECTION INTRAMUSCULAR; INTRAVENOUS
Status: DISCONTINUED | OUTPATIENT
Start: 2018-04-27 | End: 2018-04-29

## 2018-04-27 RX ORDER — OXYCODONE HYDROCHLORIDE 10 MG/1
10 TABLET ORAL
Status: DISCONTINUED | OUTPATIENT
Start: 2018-04-27 | End: 2018-05-08 | Stop reason: HOSPADM

## 2018-04-27 RX ORDER — SODIUM CHLORIDE, SODIUM GLUCONATE, SODIUM ACETATE, POTASSIUM CHLORIDE AND MAGNESIUM CHLORIDE 526; 502; 368; 37; 30 MG/100ML; MG/100ML; MG/100ML; MG/100ML; MG/100ML
INJECTION, SOLUTION INTRAVENOUS ONCE
Status: COMPLETED | OUTPATIENT
Start: 2018-04-27 | End: 2018-04-27

## 2018-04-27 RX ORDER — DIPHENHYDRAMINE HCL 25 MG
25 TABLET ORAL
Status: DISCONTINUED | OUTPATIENT
Start: 2018-04-27 | End: 2018-05-08 | Stop reason: HOSPADM

## 2018-04-27 RX ORDER — POTASSIUM CHLORIDE 14.9 MG/ML
20 INJECTION INTRAVENOUS ONCE
Status: COMPLETED | OUTPATIENT
Start: 2018-04-27 | End: 2018-04-28

## 2018-04-27 RX ORDER — POTASSIUM CHLORIDE 7.45 MG/ML
10 INJECTION INTRAVENOUS ONCE
Status: COMPLETED | OUTPATIENT
Start: 2018-04-27 | End: 2018-04-27

## 2018-04-27 RX ORDER — ATORVASTATIN CALCIUM 40 MG/1
40 TABLET, FILM COATED ORAL
Status: DISCONTINUED | OUTPATIENT
Start: 2018-04-27 | End: 2018-04-27

## 2018-04-27 RX ORDER — POTASSIUM CHLORIDE 14.9 MG/ML
20 INJECTION INTRAVENOUS ONCE
Status: COMPLETED | OUTPATIENT
Start: 2018-04-27 | End: 2018-04-27

## 2018-04-27 RX ORDER — SODIUM CHLORIDE 9 MG/ML
INJECTION, SOLUTION INTRAVENOUS
Status: ACTIVE
Start: 2018-04-27 | End: 2018-04-27

## 2018-04-27 RX ORDER — ONDANSETRON 2 MG/ML
4 INJECTION INTRAMUSCULAR; INTRAVENOUS EVERY 6 HOURS PRN
Status: DISCONTINUED | OUTPATIENT
Start: 2018-04-27 | End: 2018-05-08 | Stop reason: HOSPADM

## 2018-04-27 RX ORDER — SODIUM CHLORIDE, SODIUM GLUCONATE, SODIUM ACETATE, POTASSIUM CHLORIDE AND MAGNESIUM CHLORIDE 526; 502; 368; 37; 30 MG/100ML; MG/100ML; MG/100ML; MG/100ML; MG/100ML
INJECTION, SOLUTION INTRAVENOUS PRN
Status: DISCONTINUED | OUTPATIENT
Start: 2018-04-27 | End: 2018-04-29

## 2018-04-27 RX ORDER — SODIUM CHLORIDE, SODIUM GLUCONATE, SODIUM ACETATE, POTASSIUM CHLORIDE AND MAGNESIUM CHLORIDE 526; 502; 368; 37; 30 MG/100ML; MG/100ML; MG/100ML; MG/100ML; MG/100ML
INJECTION, SOLUTION INTRAVENOUS CONTINUOUS
Status: DISCONTINUED | OUTPATIENT
Start: 2018-04-27 | End: 2018-04-27

## 2018-04-27 RX ORDER — POTASSIUM CHLORIDE 7.45 MG/ML
10 INJECTION INTRAVENOUS ONCE
Status: COMPLETED | OUTPATIENT
Start: 2018-04-27 | End: 2018-04-28

## 2018-04-27 RX ORDER — ACETAMINOPHEN 325 MG/1
650 TABLET ORAL EVERY 4 HOURS PRN
Status: DISCONTINUED | OUTPATIENT
Start: 2018-04-27 | End: 2018-05-08 | Stop reason: HOSPADM

## 2018-04-27 RX ORDER — AMOXICILLIN 250 MG
1 CAPSULE ORAL NIGHTLY
Status: DISCONTINUED | OUTPATIENT
Start: 2018-04-27 | End: 2018-05-05

## 2018-04-27 RX ORDER — NITROGLYCERIN 20 MG/100ML
0-100 INJECTION INTRAVENOUS CONTINUOUS
Status: DISCONTINUED | OUTPATIENT
Start: 2018-04-27 | End: 2018-04-29

## 2018-04-27 RX ORDER — BISACODYL 10 MG
10 SUPPOSITORY, RECTAL RECTAL
Status: DISCONTINUED | OUTPATIENT
Start: 2018-04-27 | End: 2018-05-05

## 2018-04-27 RX ORDER — AMOXICILLIN 250 MG
1 CAPSULE ORAL
Status: DISCONTINUED | OUTPATIENT
Start: 2018-04-27 | End: 2018-05-05

## 2018-04-27 RX ORDER — MAGNESIUM SULFATE 1 G/100ML
1 INJECTION INTRAVENOUS
Status: COMPLETED | OUTPATIENT
Start: 2018-04-27 | End: 2018-04-29

## 2018-04-27 RX ORDER — CLOPIDOGREL BISULFATE 75 MG/1
75 TABLET ORAL DAILY
Status: DISCONTINUED | OUTPATIENT
Start: 2018-04-28 | End: 2018-05-08 | Stop reason: HOSPADM

## 2018-04-27 RX ORDER — SODIUM CHLORIDE 9 MG/ML
INJECTION, SOLUTION INTRAVENOUS CONTINUOUS
Status: DISCONTINUED | OUTPATIENT
Start: 2018-04-27 | End: 2018-05-03

## 2018-04-27 RX ORDER — DOCUSATE SODIUM 100 MG/1
100 CAPSULE, LIQUID FILLED ORAL EVERY MORNING
Status: DISCONTINUED | OUTPATIENT
Start: 2018-04-27 | End: 2018-05-05

## 2018-04-27 RX ORDER — DEXTROSE MONOHYDRATE 25 G/50ML
25 INJECTION, SOLUTION INTRAVENOUS PRN
Status: DISCONTINUED | OUTPATIENT
Start: 2018-04-27 | End: 2018-05-08 | Stop reason: HOSPADM

## 2018-04-27 RX ORDER — PROMETHAZINE HYDROCHLORIDE 25 MG/1
25 SUPPOSITORY RECTAL EVERY 6 HOURS PRN
Status: DISCONTINUED | OUTPATIENT
Start: 2018-04-27 | End: 2018-05-08 | Stop reason: HOSPADM

## 2018-04-27 RX ORDER — ACETAMINOPHEN 650 MG/1
650 SUPPOSITORY RECTAL EVERY 4 HOURS PRN
Status: DISCONTINUED | OUTPATIENT
Start: 2018-04-27 | End: 2018-05-08 | Stop reason: HOSPADM

## 2018-04-27 RX ORDER — LACTULOSE 20 G/30ML
30 SOLUTION ORAL
Status: DISCONTINUED | OUTPATIENT
Start: 2018-04-27 | End: 2018-05-05

## 2018-04-27 RX ORDER — ALUMINA, MAGNESIA, AND SIMETHICONE 2400; 2400; 240 MG/30ML; MG/30ML; MG/30ML
30 SUSPENSION ORAL EVERY 4 HOURS PRN
Status: DISCONTINUED | OUTPATIENT
Start: 2018-04-27 | End: 2018-05-08 | Stop reason: HOSPADM

## 2018-04-27 RX ADMIN — MIDAZOLAM HYDROCHLORIDE 2 MG: 1 INJECTION, SOLUTION INTRAMUSCULAR; INTRAVENOUS at 19:12

## 2018-04-27 RX ADMIN — MIDAZOLAM HYDROCHLORIDE 2 MG: 1 INJECTION, SOLUTION INTRAMUSCULAR; INTRAVENOUS at 22:26

## 2018-04-27 RX ADMIN — MUPIROCIN 1 APPLICATION: 20 OINTMENT TOPICAL at 21:15

## 2018-04-27 RX ADMIN — SODIUM CHLORIDE, SODIUM GLUCONATE, SODIUM ACETATE, POTASSIUM CHLORIDE AND MAGNESIUM CHLORIDE 1000 ML: 526; 502; 368; 37; 30 INJECTION, SOLUTION INTRAVENOUS at 20:59

## 2018-04-27 RX ADMIN — SODIUM BICARBONATE 50 MEQ: 84 INJECTION, SOLUTION INTRAVENOUS at 14:39

## 2018-04-27 RX ADMIN — SODIUM BICARBONATE 50 MEQ: 84 INJECTION, SOLUTION INTRAVENOUS at 17:59

## 2018-04-27 RX ADMIN — INSULIN HUMAN 3 UNITS: 100 INJECTION, SOLUTION PARENTERAL at 17:06

## 2018-04-27 RX ADMIN — SODIUM CHLORIDE, SODIUM GLUCONATE, SODIUM ACETATE, POTASSIUM CHLORIDE AND MAGNESIUM CHLORIDE: 526; 502; 368; 37; 30 INJECTION, SOLUTION INTRAVENOUS at 18:19

## 2018-04-27 RX ADMIN — POTASSIUM CHLORIDE 20 MEQ: 200 INJECTION, SOLUTION INTRAVENOUS at 15:42

## 2018-04-27 RX ADMIN — MIDAZOLAM HYDROCHLORIDE 2 MG: 1 INJECTION, SOLUTION INTRAMUSCULAR; INTRAVENOUS at 20:38

## 2018-04-27 RX ADMIN — MORPHINE SULFATE 4 MG: 4 INJECTION INTRAVENOUS at 16:40

## 2018-04-27 RX ADMIN — INSULIN HUMAN 3 UNITS: 100 INJECTION, SOLUTION PARENTERAL at 20:28

## 2018-04-27 RX ADMIN — SODIUM CHLORIDE, SODIUM GLUCONATE, SODIUM ACETATE, POTASSIUM CHLORIDE AND MAGNESIUM CHLORIDE: 526; 502; 368; 37; 30 INJECTION, SOLUTION INTRAVENOUS at 18:45

## 2018-04-27 RX ADMIN — INSULIN HUMAN 3 UNITS: 100 INJECTION, SOLUTION PARENTERAL at 23:02

## 2018-04-27 RX ADMIN — MORPHINE SULFATE 2 MG: 4 INJECTION INTRAVENOUS at 23:32

## 2018-04-27 RX ADMIN — METOPROLOL TARTRATE 12.5 MG: 25 TABLET, FILM COATED ORAL at 05:43

## 2018-04-27 RX ADMIN — SODIUM BICARBONATE 50 MEQ: 84 INJECTION, SOLUTION INTRAVENOUS at 20:50

## 2018-04-27 RX ADMIN — MORPHINE SULFATE 4 MG: 4 INJECTION INTRAVENOUS at 15:41

## 2018-04-27 RX ADMIN — INSULIN HUMAN 3 UNITS: 100 INJECTION, SOLUTION PARENTERAL at 16:01

## 2018-04-27 RX ADMIN — SODIUM BICARBONATE 50 MEQ: 84 INJECTION, SOLUTION INTRAVENOUS at 17:01

## 2018-04-27 RX ADMIN — SODIUM BICARBONATE 50 MEQ: 84 INJECTION, SOLUTION INTRAVENOUS at 16:11

## 2018-04-27 RX ADMIN — INSULIN HUMAN 3 UNITS: 100 INJECTION, SOLUTION PARENTERAL at 14:47

## 2018-04-27 RX ADMIN — INSULIN HUMAN 3 UNITS: 100 INJECTION, SOLUTION PARENTERAL at 17:54

## 2018-04-27 RX ADMIN — DEXMEDETOMIDINE HYDROCHLORIDE 1.5 MCG/KG/HR: 100 INJECTION, SOLUTION INTRAVENOUS at 18:31

## 2018-04-27 RX ADMIN — VANCOMYCIN HYDROCHLORIDE 1700 MG: 100 INJECTION, POWDER, LYOPHILIZED, FOR SOLUTION INTRAVENOUS at 16:47

## 2018-04-27 RX ADMIN — POTASSIUM CHLORIDE 10 MEQ: 7.46 INJECTION, SOLUTION INTRAVENOUS at 21:17

## 2018-04-27 RX ADMIN — INSULIN HUMAN 4 UNITS: 100 INJECTION, SOLUTION PARENTERAL at 21:52

## 2018-04-27 RX ADMIN — SODIUM BICARBONATE 50 MEQ: 84 INJECTION, SOLUTION INTRAVENOUS at 15:44

## 2018-04-27 RX ADMIN — HEPARIN SODIUM 1100 UNITS/HR: 5000 INJECTION, SOLUTION INTRAVENOUS; SUBCUTANEOUS at 01:41

## 2018-04-27 RX ADMIN — INSULIN HUMAN 3 UNITS: 100 INJECTION, SOLUTION PARENTERAL at 18:56

## 2018-04-27 RX ADMIN — POTASSIUM CHLORIDE 10 MEQ: 7.46 INJECTION, SOLUTION INTRAVENOUS at 16:57

## 2018-04-27 RX ADMIN — SODIUM CHLORIDE 500 ML: 9 INJECTION, SOLUTION INTRAVENOUS at 15:00

## 2018-04-27 RX ADMIN — SODIUM CHLORIDE 500 ML: 9 INJECTION, SOLUTION INTRAVENOUS at 14:51

## 2018-04-27 RX ADMIN — MIDAZOLAM HYDROCHLORIDE 2 MG: 1 INJECTION, SOLUTION INTRAMUSCULAR; INTRAVENOUS at 18:07

## 2018-04-27 RX ADMIN — CALCIUM CHLORIDE 1 G: 100 INJECTION, SOLUTION INTRAVENOUS at 20:06

## 2018-04-27 RX ADMIN — MORPHINE SULFATE 2 MG: 4 INJECTION INTRAVENOUS at 21:40

## 2018-04-27 RX ADMIN — DEXMEDETOMIDINE HYDROCHLORIDE 1.5 MCG/KG/HR: 100 INJECTION, SOLUTION INTRAVENOUS at 21:16

## 2018-04-27 RX ADMIN — SODIUM CHLORIDE, SODIUM GLUCONATE, SODIUM ACETATE, POTASSIUM CHLORIDE AND MAGNESIUM CHLORIDE 1000 ML: 526; 502; 368; 37; 30 INJECTION, SOLUTION INTRAVENOUS at 18:47

## 2018-04-27 RX ADMIN — MORPHINE SULFATE 2 MG: 4 INJECTION INTRAVENOUS at 20:38

## 2018-04-27 RX ADMIN — POTASSIUM CHLORIDE 20 MEQ: 200 INJECTION, SOLUTION INTRAVENOUS at 23:47

## 2018-04-27 RX ADMIN — EPINEPHRINE 0.04 MCG/KG/MIN: 1 INJECTION, SOLUTION INTRAMUSCULAR; SUBCUTANEOUS at 22:00

## 2018-04-27 RX ADMIN — SODIUM BICARBONATE 50 MEQ: 84 INJECTION, SOLUTION INTRAVENOUS at 19:32

## 2018-04-27 RX ADMIN — POTASSIUM CHLORIDE 20 MEQ: 200 INJECTION, SOLUTION INTRAVENOUS at 20:16

## 2018-04-27 RX ADMIN — MAGNESIUM SULFATE IN DEXTROSE 1 G: 10 INJECTION, SOLUTION INTRAVENOUS at 15:44

## 2018-04-27 RX ADMIN — DEXMEDETOMIDINE HYDROCHLORIDE 1.5 MCG/KG/HR: 100 INJECTION, SOLUTION INTRAVENOUS at 23:55

## 2018-04-27 RX ADMIN — MUPIROCIN 1 APPLICATION: 20 OINTMENT TOPICAL at 15:46

## 2018-04-27 ASSESSMENT — PAIN SCALES - GENERAL
PAINLEVEL_OUTOF10: 0
PAINLEVEL_OUTOF10: 0

## 2018-04-27 NOTE — PROGRESS NOTES
"Pharmacy Kinetics 48 y.o. male on vancomycin day # 1 of 3  2018    Has had Vancomycin 1500 mg iv x 1 in pre-op  at 08:30    Indication for Treatment: post-op prophylactic coverage    Pertinent history per medical record: Admitted on 2018 as a transfer from outside facility for STEMI. Pt underwent CABG x 4 with bilateral internal mammary artery harvest on 18. Surgeon requests vanco dosing x 72 hrs post-op.    Other antibiotics: None    Allergies: Patient has no known allergies.     List concerns for renal function: Obesity (BMI 32.5)    Pertinent cultures to date:   none    Recent Labs      18   2237  18   1513  18   1430   WBC  11.7*  9.7  28.9*   NEUTSPOLYS  74.10*  71.40   --      Recent Labs      18   0330  18   1513   BUN  12  9   CREATININE  1.07  0.85   ALBUMIN  3.6  4.0     No results for input(s): VANCOTROUGH, VANCOPEAK, VANCORANDOM in the last 72 hours.  Intake/Output Summary (Last 24 hours) at 18 1516  Last data filed at 18 1345   Gross per 24 hour   Intake          4452.53 ml   Output             2400 ml   Net          2052.53 ml      Blood pressure 125/84, pulse 83, temperature 36.8 °C (98.3 °F), resp. rate (!) 23, height 1.854 m (6' 1\"), weight 111.8 kg (246 lb 7.6 oz), SpO2 93 %. Temp (24hrs), Av °C (98.6 °F), Min:36.4 °C (97.5 °F), Max:37.6 °C (99.7 °F)      A/P   1. Vancomycin dose change: new start  2. Next vancomycin level: PRN  3. Goal trough: 12-16 mcg/ml  4. Comments: Pt had vanco 1500 mg iv x 1 dose in pre-op today. Will start vanco 15 mg/kg (1700 mg) iv q 12 hrs and start the dosing 8 hrs after the pre-op dose. Will not plan on checking a vanco level unless the pt's renal function changes considerably.    Anam Dominguez, PharmD    "

## 2018-04-27 NOTE — PROGRESS NOTES
Monitor Summary: .18/.14/.44. Rhythm: SB-SR with a BBB and freq./occ. PVCs . Rate: 50's-70's.     12 hour chart check.

## 2018-04-27 NOTE — CARE PLAN
Problem: Pre Op  Goal: Optimal preparation for CABG/Heart Valve surgery    Intervention: Pre op labs per MD order: CBC, CMP, INR, PTT, COD if not done in past 72 hours.  HbA1C if diabetic.  Completed. Patient not diabetic.   Intervention: Pre op diagnostics per MD order (EKG, CXR, Bilateral carotid doppler study and vein mapping)  Completed.   Intervention: Baseline assessment documented to include IS volume, weight, bilateral BP and peripheral pulses.  Completed.  Intervention: NPO at midnight except cardiac medications. (No ASA, coumadin or Plavix)  Orders in for pt to be NPO at 0000.   Intervention: Remove dentures, valuables and jewelry  No jewelry, dentures or valuables on pt to remove.   Intervention: Determine if patient is taking Beta Blockers  Patient takes metoprolol 25 mg BID at home.

## 2018-04-27 NOTE — PROGRESS NOTES
Patient taken on cardiac monitor to pre-op accompanied by myself and transport.  Pt now in the care of pre-op RN.

## 2018-04-27 NOTE — OR SURGEON
Immediate Post OP Note    PreOp Diagnosis: CAD, STEMI    PostOp Diagnosis: CAD, STEMI    Procedure(s):  MULTIPLE CORONARY ARTERY BYPASS ENDO VEIN HARVEST - X4, BILATERAL INTERNAL MAMMARY ARTERY HARVEST - Wound Class: Clean with Drain  LIMA - Wound Class: None    Surgeon(s):  Donald Pike M.D.    Anesthesiologist/Type of Anesthesia:  Anesthesiologist: Bubba Martinez M.D.  Anesthesia Technician: Markie Buitrago/General    Surgical Staff:  Circulator: Mason Kumar R.N.  Perfusionist: Denia Veloz  Relief Circulator: Kat Dawson R.N.; Paty Ramirez R.N.  Scrub Person: Julianne Lundberg; Chirag Evans  First Assist: BARBER PridePIDANIA    Specimens removed if any:  * No specimens in log *    Estimated Blood Loss: minimal    Findings:   Small caliber ascending aorta without calcification or atherosclerosis. LIMA was 2mm and had excellent flow. AMAN was 1.75mm with excellent flow. Large caliber left greater saphenous vein of good quality. PDA was 1.75mm, thin-walled and a fair target. OM2 was 2mm and a good target. No other lateral wall targets were visible. Lateral branch of D2 was 1.75mm and a good target. Mid LAD was 2mm and a good target. Grossly normal biventricular function pre and post-CPB with no new WMA and no valvular disease. LBBB pre and post-CPB but in NSR. HDS on epinephrine infusion.     Complications: none        4/27/2018 1:53 PM Donald Pike M.D.

## 2018-04-27 NOTE — CARE PLAN
Problem: Pre Op  Goal: Optimal preparation for CABG/Heart Valve surgery    Intervention: NPO at midnight except cardiac medications. (No ASA, coumadin or Plavix)  complete  Intervention: Shower with chlorhexidine x 2  complete  Intervention: Prep with clippers  complete  Intervention: Remove dentures, valuables and jewelry  complete  Intervention: Determine if patient is taking Beta Blockers  given  Intervention: Cardiac/BP meds and Mupirocin ointment given prior to surgery. Verify orders for hold or administer of anticoags.  complete  Intervention: If diabetic, FSBS in am and follow sliding scale if indicated  Dose not required

## 2018-04-27 NOTE — OP REPORT
DATE OF SERVICE:  04/27/2018    REFERRING PHYSICIAN:  Bryan Gomes MD    PREOPERATIVE DIAGNOSES:  Severe multivessel coronary artery disease, ST   elevation myocardial infarction, hypertension, left bundle branch block.    POSTOPERATIVE DIAGNOSES:  Severe multivessel coronary artery disease, ST   elevation myocardial infarction, hypertension, left bundle branch block.    PROCEDURE PERFORMED:  Coronary artery bypass grafting x4 (right internal   mammary artery to mid left anterior descending artery, left internal mammary   artery to second obtuse marginal branch, reverse saphenous vein graft to   posterior descending artery, reverse saphenous vein graft to second diagonal   branch), endoscopic vein harvesting, transesophageal echocardiography.    SURGEON:  Donald Pike MD    ASSISTANT:  OPAL Hadley    ANESTHESIA:  General.    ANESTHESIOLOGIST:  Bubba Martinez MD    PACING WIRES:  Temporary epicardial ventricular pacing wires x2.    CHEST TUBES:  32-Lebanese straight and right angle chest tubes were placed in   the mediastinum and a 24-Lebanese Kevin was placed into each pleural space to   separate Pleur-evacs.    INTRAOPERATIVE FINDINGS:  Small caliber ascending aorta without calcification   or atherosclerosis.  Left internal mammary artery was 2 mm and had excellent   flow.  Right internal mammary artery was 1.75 mm with excellent flow.  Large   caliber left greater saphenous vein was of good quality.  Evidence of fresh myocardial infarct on the lateral wall. Posterior descending artery was 1.75 mm, thin walled and a clear target.  Second obtuse marginal   branch was 2 mm and a good target.  No other lateral wall targets were   visible.  The second diagonal branch was 1.75 mm and a good target.  The mid   left anterior descending artery was 2 mm and a good target.  The patient had  grossly normal biventricular function pre- and post-cardiopulmonary bypass   with no new wall motion abnormalities and  no valvular disease.  The patient   had a left bundle-branch block pre and post-cardiopulmonary bypass with   normal sinus rhythm.  The patient was hemodynamically stable on an epinephrine   infusion.    INDICATIONS FOR PROCEDURE:  This is a 48-year-old man who presented with acute   lateral wall STEMI and underwent POBA  to the mid circumflex artery for   restoration of EZEKIEL-3 flow, but had residual severe multivessel coronary   artery disease.  On coronary angiogram, he was found to have a 90% proximal   OM1 lesion.  There were 2 small obtuse marginal arteries and then the mid left   circumflex was subtotally occluded with a greater than 99% stenosis and EZEKIEL-1   flow. POBA was performed, which left when the patient was left   with no residual stenosis and EZEKIEL-3 flow to the terminus of the circumflex.    The left anterior descending artery demonstrated 95% proximal stenosis and a   very distal subtotal stenosis near the apex of the LAD.  There is also a large   second bifurcating diagonal artery with proximal stenosis in the more medial   limb of the bifurcation with an elongated 90% stenosis and the other vessel   with a small caliber.  In addition, the right coronary artery was a dominant   vessel that had an 80% eccentric stenosis in the mid portion as well as 70%   stenosis proximal to the acute marginal artery, also a moderate size posterior   descending artery.  The left ventriculogram demonstrated akinesis of the mid   inferior wall and a calculated ejection fraction of 50%.  Patient underwent a   transthoracic echocardiogram that revealed ejection fraction of 45% with   inferior hypokinesis and right ventricular dysfunction.  Right ventricular   systolic pressure of 40 mmHg.  There was no significant valvular disease.  The   patient was subsequently referred to me for coronary revascularization.  The   risks and benefits of coronary bypass grafting were discussed with the patient   with risks including  myocardial infarction, stroke, pneumonia, prolonged   ventilation, tracheostomy, renal failure, bleeding, sternal wound infection,   permanent pacemaker as well as death.  The patient understood these risks and   wished to proceed with surgery.    PROCEDURE IN DETAIL:  Patient was brought to the operating room and laid   supine on the operating room table.  A radial arterial line was placed and   then general anesthesia was induced.  A central line was then placed as well   as a Bravo catheter.  Transesophageal echocardiography revealed grossly normal   biventricular function with no significant valvular disease.  The patient was   prepped and draped in the usual sterile fashion.  A 2-team approach was   utilized and the left greater saphenous vein was harvested endoscopically.    After it was harvested, it was checked for leaks and was irrigated with   heparinized saline.  Side branches were ligated with Hemoclips.  The leg was   then closed in the standard fashion and wrapped with an Ace wrap.  Alisson Escobar then assisted me throughout the remainder of the operation.    Simultaneously, a standard median sternotomy incision was performed and   carried to the subcutaneous tissue using Bovie electrocautery.  The sternum   was scored in the midline and divided with a reciprocating saw.  The Rultract was used to elevated the left hemisternum and the left internal mammary artery was then harvested in a standard pedicled fashion,   side branches were ligated with Hemoclips.  At this point, the Rultract was   removed and a chest retractor was placed.  The pericardium was opened and T'd   off at the diaphragm and pericardial stay sutures were placed creating a   pericardial well.  There was a serous pericardial effusion. We then inspected our targets.  The posterior descending   artery appeared to be an adequate target.  Second obtuse marginal artery   appeared was 2 mm and a good target with no other lateral wall  targets   visualized.  There was discolored myocardium along the lateral wall signifying recent infarct.   The lateral branch of the second diagonal branch appeared to be a   good target and the mid left anterior descending artery was an adequate   target.  The pericardial sutures were then placed to the side and the Rultract   was then again used to elevate the right hemisternum.  The right internal mammary   artery was then harvested in a standard pedicled fashion.  Side branches were   ligated with Hemoclips and divided.  The patient was then given systemic   heparin.  The right internal mammary was clipped and divided and soaked in   papaverine solution.  The Rultract was removed and the chest retractor was   replaced, along with the pericardial sutures.  The left   internal mammary artery was then divided and was soaked in a papaverine   solution.  Two concentric pursestring sutures were placed in the distal   ascending aorta and a pursestring was placed in the right atrial appendage.    The ascending aorta was then cannulated with a 21-Namibian EZ glide cannula.    The cannula was de-aired and connected to the cardiopulmonary bypass circuit.    The right atrial appendage was then cannulated with a dual stage venous   cannula and was then connected to the bypass circuit.  An antegrade   cardioplegia needle was then placed in the ascending aorta after pursestring   was placed.  All lines were then connected and when an adequate ACT was   reached, the patient was placed on full cardiopulmonary bypass.  The ascending   aorta was cross clamped and the heart was arrested with cold antegrade   cardioplegia.  A total of 1000 mL were given.  The heart was packed in topical   slush for topical cooling.  We then gave antegrade cardioplegia every 15-20   minutes thereafter to ensure adequate myocardial protection.  We then examined   our distal targets.  An arteriotomy was made in the distal posterior   descending artery  and end-to-side anastomosis was then made with running 7-0   Prolene using reverse saphenous vein graft to the posterior descending artery.    The vein was then flushed.  The anastomoses were inspected for leaks.  We   then gave a dose of antegrade cardioplegia as well as down the saphenous vein   graft.  The heart was then repositioned and the second obtuse marginal branch   was exposed.  The left internal mammary artery was prepared and spatulated and   an arteriotomy was made in the second obtuse marginal branch and an   end-to-side anastomosis was constructed with running 7-0 Prolene using the   left internal mammary artery to the second obtuse marginal artery.  The   bulldog was removed and a flush of red blood in the distribution of the obtuse   marginal branch indicated a patent anastomosis.  A dose of cardioplegia was given. The heart was then   repositioned and the mid left anterior descending artery was exposed.  The   right internal mammary artery was prepared and spatulated.  An arteriotomy was   made in the mid left anterior descending artery and end-to-side anastomosis   was constructed with running 7-0 Prolene using the right internal mammary   artery to the left anterior descending artery.  The bulldog was removed and a   flush of red blood in the distribution of the LAD indicated a patent   anastomosis.  A repair stitch was placed along the toe of the anastomosis. Another dose of cardioplegia was administered. The heart was then repositioned and the lateral branch of the second diagonal   branch was exposed.  An arteriotomy was made of the second diagonal branch and   an end-to-side anastomosis was constructed with a running 7-0 Prolene using   reverse saphenous vein.  Vein graft was then flushed and inspected for leaks.    The bulldogs were then removed along from the left and right internal mammary   arteries.  The aortic crossclamp was then removed and then replaced with a   side-biting clamp  across the ascending aorta. An aortotomy was then created   with a 4.5 mm punch and then a proximal anastomosis of the reverse saphenous   vein to the posterior descending artery was created using a running 6-0   Prolene.  The antegrade cardioplegia needle was then removed and additional   aortotomy was made with a 4.5 mm punch over the site of the previous antegrade   cardioplegia site.  The saphenous vein graft was then trimmed appropriately   and spatulated and then an additional proximal anastomosis was performed using   a running 6-0 Prolene using the saphenous vein graft, reverse saphenous vein   graft to the second diagonal branch.  The ascending aorta was then de-aired   with a 20-gauge needle.  The aortic side biting clamp was then removed.  Large   clips were then placed alongside the proximal anastomoses for identification   purposes.  The heart returned to normal sinus rhythm with a left bundle-branch   block spontaneously and maintained sinus rhythm throughout the rewarming process.    Temporary monopolar epicardial ventricular pacing wires were placed along   the diaphragmatic surface of the heart and tunneled underneath the skin and   secured appropriately.  I then placed a total of 4 chest tubes.  32-Icelandic   straight and right angle chest tubes were placed into the mediastinum and a   24-Icelandic Kevin was placed into each pleural space to separate Pleur-evac.    All chest tubes were secured appropriately with suture.  The patient was then   rewarmed his bladder temperature 36.5 degrees, at which time we successfully   weaned him from cardiopulmonary bypass without difficulty.  At this point,   protamine was given.  The venous cannula was then removed and the pursestring   was then tied down and reinforced with a silk suture.  When all blood volume   was transfused back to the patient, the aortic cannula was then removed and   the pursestring sutures were then tied down.  I then inspected all my  proximal   and distal anastomoses as well as the cannulation sites and found them to be   hemostatic.  A square piece of CorMatrix was used to cover the anterior surface of the right ventricle and the overlying right internal mammary artery for reentry purposes - it was tacked to the pericardium with interrupted 2-0 vicryl sutures. We then obtained hemostasis, and once this had occurred, the sternum was then reapproximated with stainless steel wires.  The linea alba, subcutaneous tissue, and skin were closed in standard fashion in layers.  A Prevena dressing was placed overlying the sternal incision. The patient was then returned to the intensive care unit in stable, but guarded   condition.  Transesophageal echocardiography revealed no new wall motion   abnormalities and grossly normal biventricular function.  The total aortic cross   clamp time was 94 minutes and the total cardiopulmonary bypass time was 142   minutes.       ____________________________________     MD STEPHANIE Agee / AQUILINO    DD:  04/27/2018 14:10:45  DT:  04/27/2018 16:43:39    D#:  [id number]  Job#:  [id number]

## 2018-04-27 NOTE — PROGRESS NOTES
There are no new changes to my previous consultation note. Carotid U/S wnl and abd U/S wnl. Proceed with CABG today.

## 2018-04-28 ENCOUNTER — APPOINTMENT (OUTPATIENT)
Dept: RADIOLOGY | Facility: MEDICAL CENTER | Age: 48
DRG: 231 | End: 2018-04-28
Attending: NURSE PRACTITIONER
Payer: COMMERCIAL

## 2018-04-28 LAB
ACTION RANGE TRIGGERED IACRT: NO
ALBUMIN SERPL BCP-MCNC: 2.8 G/DL (ref 3.2–4.9)
ALBUMIN/GLOB SERPL: 1.4 G/DL
ALP SERPL-CCNC: 50 U/L (ref 30–99)
ALT SERPL-CCNC: 47 U/L (ref 2–50)
ANION GAP SERPL CALC-SCNC: 6 MMOL/L (ref 0–11.9)
AST SERPL-CCNC: 67 U/L (ref 12–45)
BASE EXCESS BLDA CALC-SCNC: 0 MMOL/L (ref -4–3)
BASE EXCESS BLDA CALC-SCNC: 0 MMOL/L (ref -4–3)
BASE EXCESS BLDA CALC-SCNC: 2 MMOL/L (ref -4–3)
BASE EXCESS BLDA CALC-SCNC: 4 MMOL/L (ref -4–3)
BASOPHILS # BLD AUTO: 0.2 % (ref 0–1.8)
BASOPHILS # BLD: 0.03 K/UL (ref 0–0.12)
BILIRUB SERPL-MCNC: 2.9 MG/DL (ref 0.1–1.5)
BODY TEMPERATURE: ABNORMAL DEGREES
BUN SERPL-MCNC: 14 MG/DL (ref 8–22)
CA-I BLD ISE-SCNC: 0.99 MMOL/L (ref 1.1–1.3)
CA-I BLD ISE-SCNC: 1.02 MMOL/L (ref 1.1–1.3)
CA-I BLD ISE-SCNC: 1.11 MMOL/L (ref 1.1–1.3)
CA-I BLD ISE-SCNC: 1.16 MMOL/L (ref 1.1–1.3)
CALCIUM SERPL-MCNC: 7.7 MG/DL (ref 8.5–10.5)
CHLORIDE SERPL-SCNC: 111 MMOL/L (ref 96–112)
CO2 BLDA-SCNC: 26 MMOL/L (ref 20–33)
CO2 BLDA-SCNC: 26 MMOL/L (ref 20–33)
CO2 BLDA-SCNC: 27 MMOL/L (ref 20–33)
CO2 BLDA-SCNC: 29 MMOL/L (ref 20–33)
CO2 SERPL-SCNC: 25 MMOL/L (ref 20–33)
CREAT SERPL-MCNC: 0.8 MG/DL (ref 0.5–1.4)
EKG IMPRESSION: NORMAL
EKG IMPRESSION: NORMAL
EOSINOPHIL # BLD AUTO: 0 K/UL (ref 0–0.51)
EOSINOPHIL NFR BLD: 0 % (ref 0–6.9)
ERYTHROCYTE [DISTWIDTH] IN BLOOD BY AUTOMATED COUNT: 38.2 FL (ref 35.9–50)
GLOBULIN SER CALC-MCNC: 2 G/DL (ref 1.9–3.5)
GLUCOSE BLD-MCNC: 101 MG/DL (ref 65–99)
GLUCOSE BLD-MCNC: 102 MG/DL (ref 65–99)
GLUCOSE BLD-MCNC: 117 MG/DL (ref 65–99)
GLUCOSE BLD-MCNC: 133 MG/DL (ref 65–99)
GLUCOSE BLD-MCNC: 166 MG/DL (ref 65–99)
GLUCOSE BLD-MCNC: 166 MG/DL (ref 65–99)
GLUCOSE BLD-MCNC: 179 MG/DL (ref 65–99)
GLUCOSE BLD-MCNC: 189 MG/DL (ref 65–99)
GLUCOSE BLD-MCNC: 201 MG/DL (ref 65–99)
GLUCOSE BLD-MCNC: 222 MG/DL (ref 65–99)
GLUCOSE BLD-MCNC: 230 MG/DL (ref 65–99)
GLUCOSE BLD-MCNC: 235 MG/DL (ref 65–99)
GLUCOSE BLD-MCNC: 236 MG/DL (ref 65–99)
GLUCOSE BLD-MCNC: 237 MG/DL (ref 65–99)
GLUCOSE BLD-MCNC: 237 MG/DL (ref 65–99)
GLUCOSE BLD-MCNC: 250 MG/DL (ref 65–99)
GLUCOSE BLD-MCNC: 76 MG/DL (ref 65–99)
GLUCOSE BLD-MCNC: 80 MG/DL (ref 65–99)
GLUCOSE BLD-MCNC: 83 MG/DL (ref 65–99)
GLUCOSE BLD-MCNC: 84 MG/DL (ref 65–99)
GLUCOSE BLD-MCNC: 92 MG/DL (ref 65–99)
GLUCOSE SERPL-MCNC: 90 MG/DL (ref 65–99)
HCO3 BLDA-SCNC: 25 MMOL/L (ref 17–25)
HCO3 BLDA-SCNC: 25.2 MMOL/L (ref 17–25)
HCO3 BLDA-SCNC: 25.3 MMOL/L (ref 17–25)
HCO3 BLDA-SCNC: 27.6 MMOL/L (ref 17–25)
HCT VFR BLD AUTO: 35.7 % (ref 42–52)
HCT VFR BLD CALC: 32 % (ref 42–52)
HGB BLD-MCNC: 10.9 G/DL (ref 14–18)
HGB BLD-MCNC: 13 G/DL (ref 14–18)
IMM GRANULOCYTES # BLD AUTO: 0.06 K/UL (ref 0–0.11)
IMM GRANULOCYTES NFR BLD AUTO: 0.4 % (ref 0–0.9)
INST. QUALIFIED PATIENT IIQPT: YES
LYMPHOCYTES # BLD AUTO: 1.21 K/UL (ref 1–4.8)
LYMPHOCYTES NFR BLD: 8.5 % (ref 22–41)
MCH RBC QN AUTO: 31.2 PG (ref 27–33)
MCHC RBC AUTO-ENTMCNC: 36.4 G/DL (ref 33.7–35.3)
MCV RBC AUTO: 85.6 FL (ref 81.4–97.8)
MONOCYTES # BLD AUTO: 1.99 K/UL (ref 0–0.85)
MONOCYTES NFR BLD AUTO: 13.9 % (ref 0–13.4)
NEUTROPHILS # BLD AUTO: 11.02 K/UL (ref 1.82–7.42)
NEUTROPHILS NFR BLD: 77 % (ref 44–72)
NRBC # BLD AUTO: 0 K/UL
NRBC BLD-RTO: 0 /100 WBC
O2/TOTAL GAS SETTING VFR VENT: 50 %
O2/TOTAL GAS SETTING VFR VENT: 50 %
O2/TOTAL GAS SETTING VFR VENT: 60 %
O2/TOTAL GAS SETTING VFR VENT: 80 %
PCO2 BLDA: 33.4 MMHG (ref 26–37)
PCO2 BLDA: 38.4 MMHG (ref 26–37)
PCO2 BLDA: 41.6 MMHG (ref 26–37)
PCO2 BLDA: 45.2 MMHG (ref 26–37)
PCO2 TEMP ADJ BLDA: 34.4 MMHG (ref 26–37)
PCO2 TEMP ADJ BLDA: 41 MMHG (ref 26–37)
PCO2 TEMP ADJ BLDA: 44.1 MMHG (ref 26–37)
PCO2 TEMP ADJ BLDA: 45.2 MMHG (ref 26–37)
PH BLDA: 7.36 [PH] (ref 7.4–7.5)
PH BLDA: 7.39 [PH] (ref 7.4–7.5)
PH BLDA: 7.46 [PH] (ref 7.4–7.5)
PH BLDA: 7.49 [PH] (ref 7.4–7.5)
PH TEMP ADJ BLDA: 7.36 [PH] (ref 7.4–7.5)
PH TEMP ADJ BLDA: 7.37 [PH] (ref 7.4–7.5)
PH TEMP ADJ BLDA: 7.44 [PH] (ref 7.4–7.5)
PH TEMP ADJ BLDA: 7.48 [PH] (ref 7.4–7.5)
PLATELET # BLD AUTO: 121 K/UL (ref 164–446)
PMV BLD AUTO: 10.3 FL (ref 9–12.9)
PO2 BLDA: 200 MMHG (ref 64–87)
PO2 BLDA: 78 MMHG (ref 64–87)
PO2 BLDA: 82 MMHG (ref 64–87)
PO2 BLDA: 98 MMHG (ref 64–87)
PO2 TEMP ADJ BLDA: 106 MMHG (ref 64–87)
PO2 TEMP ADJ BLDA: 200 MMHG (ref 64–87)
PO2 TEMP ADJ BLDA: 82 MMHG (ref 64–87)
PO2 TEMP ADJ BLDA: 91 MMHG (ref 64–87)
POTASSIUM BLD-SCNC: 3.1 MMOL/L (ref 3.6–5.5)
POTASSIUM BLD-SCNC: 3.6 MMOL/L (ref 3.6–5.5)
POTASSIUM BLD-SCNC: 4.9 MMOL/L (ref 3.6–5.5)
POTASSIUM BLD-SCNC: 5.2 MMOL/L (ref 3.6–5.5)
POTASSIUM SERPL-SCNC: 3.7 MMOL/L (ref 3.6–5.5)
PROT SERPL-MCNC: 4.8 G/DL (ref 6–8.2)
RBC # BLD AUTO: 4.17 M/UL (ref 4.7–6.1)
SAO2 % BLDA: 100 % (ref 93–99)
SAO2 % BLDA: 97 % (ref 93–99)
SAO2 % BLDA: 97 % (ref 93–99)
SAO2 % BLDA: 98 % (ref 93–99)
SODIUM BLD-SCNC: 134 MMOL/L (ref 135–145)
SODIUM BLD-SCNC: 134 MMOL/L (ref 135–145)
SODIUM BLD-SCNC: 139 MMOL/L (ref 135–145)
SODIUM BLD-SCNC: 141 MMOL/L (ref 135–145)
SODIUM SERPL-SCNC: 142 MMOL/L (ref 135–145)
SPECIMEN DRAWN FROM PATIENT: ABNORMAL
WBC # BLD AUTO: 14.3 K/UL (ref 4.8–10.8)

## 2018-04-28 PROCEDURE — 700102 HCHG RX REV CODE 250 W/ 637 OVERRIDE(OP): Performed by: INTERNAL MEDICINE

## 2018-04-28 PROCEDURE — 700111 HCHG RX REV CODE 636 W/ 250 OVERRIDE (IP): Performed by: NURSE PRACTITIONER

## 2018-04-28 PROCEDURE — 82962 GLUCOSE BLOOD TEST: CPT | Mod: 91

## 2018-04-28 PROCEDURE — 700111 HCHG RX REV CODE 636 W/ 250 OVERRIDE (IP): Performed by: INTERNAL MEDICINE

## 2018-04-28 PROCEDURE — A9270 NON-COVERED ITEM OR SERVICE: HCPCS | Performed by: INTERNAL MEDICINE

## 2018-04-28 PROCEDURE — 700112 HCHG RX REV CODE 229: Performed by: NURSE PRACTITIONER

## 2018-04-28 PROCEDURE — 82803 BLOOD GASES ANY COMBINATION: CPT | Mod: 91

## 2018-04-28 PROCEDURE — 93005 ELECTROCARDIOGRAM TRACING: CPT | Performed by: NURSE PRACTITIONER

## 2018-04-28 PROCEDURE — 84132 ASSAY OF SERUM POTASSIUM: CPT

## 2018-04-28 PROCEDURE — 700101 HCHG RX REV CODE 250: Performed by: NURSE PRACTITIONER

## 2018-04-28 PROCEDURE — 700105 HCHG RX REV CODE 258: Performed by: NURSE PRACTITIONER

## 2018-04-28 PROCEDURE — 37799 UNLISTED PX VASCULAR SURGERY: CPT

## 2018-04-28 PROCEDURE — 87086 URINE CULTURE/COLONY COUNT: CPT

## 2018-04-28 PROCEDURE — 94667 MNPJ CHEST WALL 1ST: CPT

## 2018-04-28 PROCEDURE — 94150 VITAL CAPACITY TEST: CPT

## 2018-04-28 PROCEDURE — 84295 ASSAY OF SERUM SODIUM: CPT

## 2018-04-28 PROCEDURE — 36600 WITHDRAWAL OF ARTERIAL BLOOD: CPT

## 2018-04-28 PROCEDURE — 700102 HCHG RX REV CODE 250 W/ 637 OVERRIDE(OP): Performed by: NURSE PRACTITIONER

## 2018-04-28 PROCEDURE — 94640 AIRWAY INHALATION TREATMENT: CPT

## 2018-04-28 PROCEDURE — 87040 BLOOD CULTURE FOR BACTERIA: CPT | Mod: 91

## 2018-04-28 PROCEDURE — A9270 NON-COVERED ITEM OR SERVICE: HCPCS | Performed by: NURSE PRACTITIONER

## 2018-04-28 PROCEDURE — 80053 COMPREHEN METABOLIC PANEL: CPT

## 2018-04-28 PROCEDURE — 85014 HEMATOCRIT: CPT

## 2018-04-28 PROCEDURE — 71045 X-RAY EXAM CHEST 1 VIEW: CPT

## 2018-04-28 PROCEDURE — 700101 HCHG RX REV CODE 250: Performed by: INTERNAL MEDICINE

## 2018-04-28 PROCEDURE — 93010 ELECTROCARDIOGRAM REPORT: CPT | Performed by: INTERNAL MEDICINE

## 2018-04-28 PROCEDURE — 82330 ASSAY OF CALCIUM: CPT

## 2018-04-28 PROCEDURE — 700105 HCHG RX REV CODE 258: Performed by: THORACIC SURGERY (CARDIOTHORACIC VASCULAR SURGERY)

## 2018-04-28 PROCEDURE — 770022 HCHG ROOM/CARE - ICU (200)

## 2018-04-28 PROCEDURE — 94003 VENT MGMT INPAT SUBQ DAY: CPT

## 2018-04-28 PROCEDURE — 85025 COMPLETE CBC W/AUTO DIFF WBC: CPT

## 2018-04-28 PROCEDURE — 700111 HCHG RX REV CODE 636 W/ 250 OVERRIDE (IP): Performed by: THORACIC SURGERY (CARDIOTHORACIC VASCULAR SURGERY)

## 2018-04-28 PROCEDURE — 94668 MNPJ CHEST WALL SBSQ: CPT

## 2018-04-28 RX ORDER — IPRATROPIUM BROMIDE AND ALBUTEROL SULFATE 2.5; .5 MG/3ML; MG/3ML
3 SOLUTION RESPIRATORY (INHALATION)
Status: DISCONTINUED | OUTPATIENT
Start: 2018-04-28 | End: 2018-05-08 | Stop reason: HOSPADM

## 2018-04-28 RX ORDER — MORPHINE SULFATE 15 MG/1
15 TABLET, FILM COATED, EXTENDED RELEASE ORAL EVERY 12 HOURS
Status: DISCONTINUED | OUTPATIENT
Start: 2018-04-28 | End: 2018-04-30

## 2018-04-28 RX ORDER — SODIUM CHLORIDE 9 MG/ML
INJECTION, SOLUTION INTRAVENOUS
Status: ACTIVE
Start: 2018-04-28 | End: 2018-04-28

## 2018-04-28 RX ORDER — FAMOTIDINE 20 MG/1
20 TABLET, FILM COATED ORAL 2 TIMES DAILY
Status: DISCONTINUED | OUTPATIENT
Start: 2018-04-28 | End: 2018-04-29

## 2018-04-28 RX ORDER — MORPHINE SULFATE 4 MG/ML
4 INJECTION, SOLUTION INTRAMUSCULAR; INTRAVENOUS ONCE
Status: COMPLETED | OUTPATIENT
Start: 2018-04-28 | End: 2018-04-28

## 2018-04-28 RX ORDER — FUROSEMIDE 10 MG/ML
20 INJECTION INTRAMUSCULAR; INTRAVENOUS ONCE
Status: COMPLETED | OUTPATIENT
Start: 2018-04-28 | End: 2018-04-28

## 2018-04-28 RX ORDER — POTASSIUM CHLORIDE 14.9 MG/ML
20 INJECTION INTRAVENOUS ONCE
Status: COMPLETED | OUTPATIENT
Start: 2018-04-28 | End: 2018-04-28

## 2018-04-28 RX ADMIN — DOCUSATE SODIUM 100 MG: 100 CAPSULE ORAL at 15:49

## 2018-04-28 RX ADMIN — MORPHINE SULFATE 4 MG: 4 INJECTION INTRAVENOUS at 10:47

## 2018-04-28 RX ADMIN — MIDAZOLAM HYDROCHLORIDE 2 MG: 1 INJECTION, SOLUTION INTRAMUSCULAR; INTRAVENOUS at 00:11

## 2018-04-28 RX ADMIN — MORPHINE SULFATE 4 MG: 4 INJECTION INTRAVENOUS at 04:20

## 2018-04-28 RX ADMIN — INSULIN HUMAN 2 UNITS: 100 INJECTION, SOLUTION PARENTERAL at 00:59

## 2018-04-28 RX ADMIN — MORPHINE SULFATE 4 MG: 4 INJECTION INTRAVENOUS at 14:16

## 2018-04-28 RX ADMIN — ROSUVASTATIN CALCIUM 20 MG: 10 TABLET, FILM COATED ORAL at 19:33

## 2018-04-28 RX ADMIN — CLOPIDOGREL 75 MG: 75 TABLET, FILM COATED ORAL at 15:49

## 2018-04-28 RX ADMIN — MIDAZOLAM HYDROCHLORIDE 2 MG: 1 INJECTION, SOLUTION INTRAMUSCULAR; INTRAVENOUS at 02:22

## 2018-04-28 RX ADMIN — MIDAZOLAM HYDROCHLORIDE 2 MG: 1 INJECTION, SOLUTION INTRAMUSCULAR; INTRAVENOUS at 08:00

## 2018-04-28 RX ADMIN — STANDARDIZED SENNA CONCENTRATE AND DOCUSATE SODIUM 1 TABLET: 8.6; 5 TABLET, FILM COATED ORAL at 19:33

## 2018-04-28 RX ADMIN — OXYCODONE HYDROCHLORIDE 10 MG: 10 TABLET ORAL at 15:37

## 2018-04-28 RX ADMIN — SODIUM CHLORIDE 4.5 UNITS/HR: 9 INJECTION, SOLUTION INTRAVENOUS at 10:30

## 2018-04-28 RX ADMIN — MUPIROCIN 1 APPLICATION: 20 OINTMENT TOPICAL at 08:13

## 2018-04-28 RX ADMIN — MAGNESIUM SULFATE IN DEXTROSE 1 G: 10 INJECTION, SOLUTION INTRAVENOUS at 14:47

## 2018-04-28 RX ADMIN — ASPIRIN 81 MG: 81 TABLET, COATED ORAL at 15:49

## 2018-04-28 RX ADMIN — MIDAZOLAM HYDROCHLORIDE 1 MG: 1 INJECTION, SOLUTION INTRAMUSCULAR; INTRAVENOUS at 06:04

## 2018-04-28 RX ADMIN — MIDAZOLAM HYDROCHLORIDE 2 MG: 1 INJECTION, SOLUTION INTRAMUSCULAR; INTRAVENOUS at 10:54

## 2018-04-28 RX ADMIN — MORPHINE SULFATE 2 MG: 4 INJECTION INTRAVENOUS at 03:17

## 2018-04-28 RX ADMIN — METOPROLOL TARTRATE 12.5 MG: 25 TABLET, FILM COATED ORAL at 19:33

## 2018-04-28 RX ADMIN — IPRATROPIUM BROMIDE AND ALBUTEROL SULFATE 3 ML: .5; 3 SOLUTION RESPIRATORY (INHALATION) at 21:41

## 2018-04-28 RX ADMIN — OXYCODONE HYDROCHLORIDE 10 MG: 10 TABLET ORAL at 19:32

## 2018-04-28 RX ADMIN — VANCOMYCIN HYDROCHLORIDE 1700 MG: 100 INJECTION, POWDER, LYOPHILIZED, FOR SOLUTION INTRAVENOUS at 15:58

## 2018-04-28 RX ADMIN — VANCOMYCIN HYDROCHLORIDE 1700 MG: 100 INJECTION, POWDER, LYOPHILIZED, FOR SOLUTION INTRAVENOUS at 04:38

## 2018-04-28 RX ADMIN — INSULIN HUMAN 2 UNITS: 100 INJECTION, SOLUTION PARENTERAL at 02:02

## 2018-04-28 RX ADMIN — POTASSIUM CHLORIDE 10 MEQ: 7.46 INJECTION, SOLUTION INTRAVENOUS at 00:54

## 2018-04-28 RX ADMIN — MUPIROCIN 1 APPLICATION: 20 OINTMENT TOPICAL at 19:33

## 2018-04-28 RX ADMIN — MORPHINE SULFATE 4 MG: 4 INJECTION INTRAVENOUS at 21:43

## 2018-04-28 RX ADMIN — FAMOTIDINE 20 MG: 20 TABLET ORAL at 19:33

## 2018-04-28 RX ADMIN — POTASSIUM CHLORIDE 20 MEQ: 200 INJECTION, SOLUTION INTRAVENOUS at 06:14

## 2018-04-28 RX ADMIN — MORPHINE SULFATE 4 MG: 4 INJECTION INTRAVENOUS at 08:03

## 2018-04-28 RX ADMIN — FUROSEMIDE 20 MG: 10 INJECTION, SOLUTION INTRAMUSCULAR; INTRAVENOUS at 20:28

## 2018-04-28 RX ADMIN — FAMOTIDINE 20 MG: 10 INJECTION INTRAVENOUS at 12:26

## 2018-04-28 RX ADMIN — ACETAMINOPHEN 650 MG: 325 TABLET, FILM COATED ORAL at 16:12

## 2018-04-28 RX ADMIN — DEXMEDETOMIDINE HYDROCHLORIDE 1.5 MCG/KG/HR: 100 INJECTION, SOLUTION INTRAVENOUS at 05:01

## 2018-04-28 RX ADMIN — MORPHINE SULFATE 4 MG: 4 INJECTION INTRAVENOUS at 12:26

## 2018-04-28 RX ADMIN — DEXMEDETOMIDINE HYDROCHLORIDE 0.5 MCG/KG/HR: 100 INJECTION, SOLUTION INTRAVENOUS at 10:29

## 2018-04-28 RX ADMIN — MORPHINE SULFATE 15 MG: 15 TABLET, EXTENDED RELEASE ORAL at 22:05

## 2018-04-28 RX ADMIN — TRAMADOL HYDROCHLORIDE 50 MG: 50 TABLET, COATED ORAL at 23:30

## 2018-04-28 RX ADMIN — DEXMEDETOMIDINE HYDROCHLORIDE 1.5 MCG/KG/HR: 100 INJECTION, SOLUTION INTRAVENOUS at 02:28

## 2018-04-28 RX ADMIN — MIDAZOLAM HYDROCHLORIDE 2 MG: 1 INJECTION, SOLUTION INTRAMUSCULAR; INTRAVENOUS at 04:42

## 2018-04-28 RX ADMIN — INSULIN HUMAN 22 UNITS: 100 INJECTION, SUSPENSION SUBCUTANEOUS at 12:30

## 2018-04-28 RX ADMIN — SODIUM CHLORIDE 7 UNITS/HR: 9 INJECTION, SOLUTION INTRAVENOUS at 00:04

## 2018-04-28 RX ADMIN — TRAMADOL HYDROCHLORIDE 50 MG: 50 TABLET, COATED ORAL at 18:16

## 2018-04-28 ASSESSMENT — PAIN SCALES - GENERAL
PAINLEVEL_OUTOF10: 4
PAINLEVEL_OUTOF10: 6
PAINLEVEL_OUTOF10: 7
PAINLEVEL_OUTOF10: 5
PAINLEVEL_OUTOF10: 7
PAINLEVEL_OUTOF10: 4
PAINLEVEL_OUTOF10: 6
PAINLEVEL_OUTOF10: 5

## 2018-04-28 ASSESSMENT — PULMONARY FUNCTION TESTS: FVC: .77

## 2018-04-28 NOTE — CARE PLAN
Problem: Day of surgery post CABG/Heart valve replacement  Goal: Stabilization in immediate post op period    Intervention: VS q 15 min x 4 hours, then q 1 hour. Include temperature immediately upon arrival. Check CO/CI q 2-4 hours and PRN  Done and in process  Intervention: If radial artery used, elevate arm, no BP checks or needle sticks from affected arm, monitor ulnar pulse and capillary refill  Not applicable  Intervention: First post op hour labs and diagnostics per MD order  Done  Intervention: Serum K q 6 hours x 24 hours.  ABG and CBC prn.  Done and in process  Intervention: For FSBS greater than 130, start Post Cardiac Surgery Insulin Drip Protocol  Done  Intervention: FSBS frequency as per Cardiac Surgery Insulin Drip Protocol  Done  Intervention: Chest tube to 20 cm suction, record CT drainage with VS  Done  Intervention: For CT drainage > 300 cc in first post op hour and/or 150 cc in subsequent hours: platelets, coag screen, fibrinogen, H&H per order  Noted  Intervention: Titrate and wean off vasoactive drips per patient's condition and per MD order while maintaining SBP  mmHg per MD order  In process  Intervention: VAP protocol in place  yes  Intervention: Wean from vent per protocol (see protocol), extubation goal with 2-6 hours post op.  Pt to rest on vent tonight secondary to increased acidosis.

## 2018-04-28 NOTE — PROGRESS NOTES
"Pharmacy Kinetics 48 y.o. male on vancomycin day # 2 of 3  2018    Currently on Vancomycin 1700 mg iv q12hr    Indication for Treatment: post-op prophylactic coverage     Pertinent history per medical record: Admitted on 2018 as a transfer from outside facility for STEMI. Pt underwent CABG x 4 with bilateral internal mammary artery harvest on 18. Surgeon requests vanco dosing x 72 hrs post-op.     Other antibiotics: None     Allergies: Patient has no known allergies.      List concerns for renal function: Obesity (BMI 32.5)     Pertinent cultures to date:   none    Recent Labs      18   2237  18   1513  18   1430  18   0510   WBC  11.7*  9.7  28.9*  14.3*   NEUTSPOLYS  74.10*  71.40  74.80*  77.00*     Recent Labs      18   0330  18   1513  18   1430  18   1840  18   0510   BUN  12  9  14  16  14   CREATININE  1.07  0.85  1.04  1.41*  0.80   ALBUMIN  3.6  4.0  3.2   --   2.8*     No results for input(s): VANCOTROUGH, VANCOPEAK, VANCORANDOM in the last 72 hours.  Intake/Output Summary (Last 24 hours) at 18 1205  Last data filed at 18 1200   Gross per 24 hour   Intake           8691.1 ml   Output             4566 ml   Net           4125.1 ml      Blood pressure 125/84, pulse 75, temperature 37.9 °C (100.2 °F), resp. rate (!) 37, height 1.854 m (6' 1\"), weight 116.6 kg (257 lb 0.9 oz), SpO2 95 %. Temp (24hrs), Av.8 °C (100.1 °F), Min:37.8 °C (100 °F), Max:37.9 °C (100.2 °F)      A/P   1. Vancomycin dose change: No  2. Next vancomycin level: PRN  3. Goal trough: 12-16 mcg/ml  4. Comments: Pt on vanco 15 mg/kg q 12 hrs x 72 hrs post-op for prophylaxis per [facility]. Renal function appears stable. Will not check a vanco trough level. Last dose will be tomorrow morning.    Anam Dominguez, PharmD    "

## 2018-04-28 NOTE — PROGRESS NOTES
Pulmonary Critical Care Progress Note      Date of service:  4/28/2018    Chief Complaint:  Postoperative ventilator management    Interval Events:  24 hour interval history reviewed  Reason for visit:   Postoperative ventilator management, acute systolic heart failure,  Unable to provide ROS due to medical condition       - dex 0.5   - SR   - follows   - epi 0.029   - insulin gtt 4.5   - STEMI   - vent 2      Review of Systems   Unable to perform ROS: Medical condition       Physical Exam   Constitutional:   Sedated on ventilator   HENT:   Head: Normocephalic and atraumatic.   Right Ear: External ear normal.   Left Ear: External ear normal.   Nose: Nose normal.   Mouth/Throat: Oropharynx is clear and moist. No oropharyngeal exudate.   Eyes: Conjunctivae and EOM are normal. Pupils are equal, round, and reactive to light. Right eye exhibits no discharge. Left eye exhibits no discharge. No scleral icterus.   Neck: Normal range of motion. Neck supple. No JVD present. No tracheal deviation present.   Cardiovascular: Intact distal pulses.  Exam reveals no gallop.    Sinus rhythm   Pulmonary/Chest: No stridor. He has no wheezes. He has rales (Few scattered crackles bilaterally at the bases).   Abdominal: Soft. Bowel sounds are normal. He exhibits no distension. There is no tenderness. There is no rebound and no guarding.   Musculoskeletal: He exhibits no edema, tenderness or deformity.   No clubbing or cyanosis   Neurological:   Sedated, arouses easily, nods, follows, moves all 4 extremities.   Skin: Skin is warm and dry. No rash noted. He is not diaphoretic. No erythema. No pallor.       PFSH:  No change.    Respiratory:  [place] Vent Mode: APVCMV, Rate (breaths/min): 20, Vt Target (mL): 500, PEEP/CPAP: 8, FiO2: 50, Static Compliance (ml / cm H2O): 35.5, Control VTE (exp VT): 489  Pulse Oximetry: 99 %  Vent waveforms and airway mechanics reviewed in detail.  CXR personally reviewed and compared to prior images  CXR with  improved edema.    Recent Labs      04/27/18   2148  04/27/18   2312  04/28/18   0313   ISTATAPH  7.345*  7.372*  7.487   ISTATAPCO2  30.8  35.8  33.4   ISTATAPO2  79  73  78   ISTATATCO2  18*  22  26   DQSCAKU4PSF  95  94  97   ISTATARTHCO3  16.8*  20.8  25.3*   ISTATARTBE  -8*  -4  2   ISTATTEMP  37.6 C  37.7 C  37.7 C   ISTATFIO2  60  60  50   ISTATSPEC  Arterial  Arterial  Arterial   ISTATAPHTC  7.337*  7.361*  7.477   PURRARCW8EM  82  77  82       HemoDynamics:  Pulse: 77, Heart Rate (Monitored): 77  Blood Pressure: 125/84, Arterial BP: (!) 94/59, NIBP: (!) 87/72  CVP (mm Hg): (!) 10 MM HG  Epinephrine drip titrating    Recent Labs      04/25/18 2237  04/26/18   0330   TROPONINI   --   >50.00*   BNPBTYPENAT  47   --      Neuro:  Dexmedetomidine 0.5    Fluids:  Intake/Output       04/26/18 0700 - 04/27/18 0659 04/27/18 0700 - 04/28/18 0659 04/28/18 0700 - 04/29/18 0659      4274-4216 2660-8524 Total 6269-6256 2549-2595 Total 9645-2640 1227-7572 Total       Intake    P.O.  360  80 440  --  -- --  --  -- --    P.O. 360 80 440 -- -- -- -- -- --    I.V.  252.2  218.5 470.7  5815.9  2078.8 7894.6  --  -- --    Crystalloid Intake -- -- -- 3500 -- 3500 -- -- --    Precedex Volume -- -- -- 49 486.7 535.7 -- -- --    Insulin Volume -- -- -- 49.6 381.3 430.9 -- -- --    Heparin Volume 252.2 218.5 470.7 -- -- -- -- -- --    Epinephrine Volume -- -- -- 117.3 210.8 328 -- -- --    IV Piggyback Volume -- -- -- 1100 1000 2100 -- -- --    Free Fluid -- -- -- 1000 -- 1000 -- -- --    Blood  --  -- --  450  -- 450  --  -- --    Cell Saver Volume (mL) -- -- -- 450 -- 450 -- -- --    Total Intake 612.2 298.5 910.7 6265.9 2078.8 8344.6 -- -- --       Output    Urine  1325  1300 2625  1685  1179 2864  --  -- --    Number of Times Voided 2 x 1 x 3 x -- -- -- -- -- --    Urine Void (mL) (non-catheter) 1325 1300 2625 -- -- -- -- -- --    Output (mL) (Urinary Catheter Indwelling Catheter) -- -- -- 1685 1179 2864 -- -- --    Stool  --   -- --  --  -- --  --  -- --    Number of Times Stooled 0 x -- 0 x -- -- -- -- -- --    Chest Tube  --  -- --  173  259 432  --  -- --    Output (mL) (Chest Tube Group 1 (A) Mediastinal round-Kevin 24) -- -- -- 85 120 205 -- -- --    Output (mL) (Chest Tube Group 2 (B) Mediastinal round-Kevin 24) -- -- -- 25 52 77 -- -- --    Output (mL) (Chest Tube Group 3 (C) Mediastinal;Right;Left angled and straight 32) -- -- -- 63 87 150 -- -- --    Blood  --  -- --  920  -- 920  --  -- --    Est. Blood Loss (mL) -- -- -- 920 -- 920 -- -- --    Total Output 1325 1300 2625 2778 1438 4216 -- -- --       Net I/O     -712.8 -1001.5 -1714.3 3487.9 640.8 4128.6 -- -- --        Weight: 116.6 kg (257 lb 0.9 oz)  Recent Labs      04/27/18   1430  04/27/18   1840  04/28/18   0510   SODIUM  137  142  142   POTASSIUM  3.4*  3.0*  3.7   CHLORIDE  103  105  111   CO2  19*  14*  25   BUN  14  16  14   CREATININE  1.04  1.41*  0.80   MAGNESIUM  2.5   --    --    CALCIUM  7.7*  7.5*  7.7*       GI/Nutrition:    Liver Function  Recent Labs      04/26/18   0330  04/26/18   1513  04/27/18   1430  04/27/18   1840  04/28/18   0510   ALTSGPT  67*  75*  55*   --   47   ASTSGOT  229*  162*  107*   --   67*   ALKPHOSPHAT  65  66  59   --   50   TBILIRUBIN  2.6*  3.8*  4.5*   --   2.9*   DBILIRUBIN   --   0.4   --    --    --    LIPASE  82   --    --    --    --    GLUCOSE  109*  103*  199*  249*  90       Heme:  Recent Labs      04/25/18 2237 04/26/18   1513  04/26/18   1530  04/26/18   2245  04/27/18   1430  04/28/18   0510   RBC  4.78   --   4.87   --    --   4.67*  4.17*   HEMOGLOBIN  14.5   --   15.1   --    --   14.5  13.0*   HEMATOCRIT  43.0   --   42.3   --    --   41.1*  35.7*   PLATELETCT  145*   --   148*   --    --   195  121*   PROTHROMBTM  13.0   --    --   13.7   --   15.6*   --    APTT  29.1   < >   --   63.2*  49.6*  32.4   --    INR  1.01   --    --   1.08   --   1.27*   --     < > = values in this interval not displayed.        Infectious Disease:  Monitored Temp 2  Av.7 °C (99.9 °F)  Min: 37.6 °C (99.7 °F)  Max: 37.8 °C (100 °F)  Temp  Av.8 °C (98.3 °F)  Min: 36.8 °C (98.3 °F)  Max: 36.8 °C (98.3 °F)    Recent Labs      18   1513  18   1430  18   0510   WBC  9.7  28.9*  14.3*   NEUTSPOLYS  71.40  74.80*  77.00*   LYMPHOCYTES  16.80*  12.20*  8.50*   MONOCYTES  10.10  13.00  13.90*   EOSINOPHILS  1.20  0.00  0.00   BASOPHILS  0.20  0.00  0.20   ASTSGOT  162*  107*  67*   ALTSGPT  75*  55*  47   ALKPHOSPHAT  66  59  50   TBILIRUBIN  3.8*  4.5*  2.9*     Current Facility-Administered Medications   Medication Dose Frequency Provider Last Rate Last Dose   • potassium chloride in water (KCL) ivpb **Administer in ICU only** 20 mEq  20 mEq Once Alisson Montanat, A.P.N. 50 mL/hr at 18 0614 20 mEq at 18 0614   • Respiratory Care per Protocol   Continuous RT Alisson TYREL Montanat, A.P.N.       • NS infusion   Continuous Alisson TYREL Montanat, A.P.N. 10 mL/hr at 18 1500 500 mL at 18 1500   • Pharmacy Consult Request ...Pain Management Review 1 Each  1 Each PRN Alisson Montanat, A.P.N.       • docusate sodium (COLACE) capsule 100 mg  100 mg QAM Alisson TYREL Montanat, A.P.N.   Stopped at 18 1445    And   • senna-docusate (PERICOLACE or SENOKOT S) 8.6-50 MG per tablet 1 Tab  1 Tab Nightly Alisson TYREL Montanat, A.P.N.   Stopped at 18 2100    And   • senna-docusate (PERICOLACE or SENOKOT S) 8.6-50 MG per tablet 1 Tab  1 Tab Q24HRS PRN Alisson L. Shawn, A.P.N.        And   • lactulose 20 GM/30ML solution 30 mL  30 mL Q24HRS PRN Alisson Escobar, A.P.N.        And   • bisacodyl (DULCOLAX) suppository 10 mg  10 mg Q24HRS PRN Alisson Escobar, A.P.N.        And   • fleet enema 133 mL  1 Each Once PRN Alisson Escobar, A.P.N.       • enoxaparin (LOVENOX) inj 40 mg  40 mg DAILY Alisson Escobar, A.P.N.       • mupirocin (BACTROBAN) 2 % ointment 1 Application  1 Application BID Alisson Escobar, A.P.N.   1  Application at 04/27/18 2115   • Magnesium Sulfate in D5W IVPB premix 1 g  1 g QDAY Alisson Escobar, A.P.N.   Stopped at 04/27/18 1644   • metoprolol (LOPRESSOR) tablet 12.5 mg  12.5 mg BID Alisson Escobar, A.P.N.        Followed by   • [START ON 4/29/2018] metoprolol (LOPRESSOR) tablet 25 mg  25 mg BID Alisson Escobar, A.P.N.       • aspirin EC (ECOTRIN) tablet 81 mg  81 mg DAILY Alisson Escobar, A.P.N.       • clopidogrel (PLAVIX) tablet 75 mg  75 mg DAILY Alisson Escobar, A.P.N.       • electrolyte-A (PLASMALYTE-A) infusion   PRN Alisson Escobar, A.P.N.   1,000 mL at 04/27/18 2059   • clevidipine (CLEVIPREX) IV emulsion  0-10 mg/hr Continuous Alisson Escobar, A.P.N.   Stopped at 04/27/18 1445   • nitroglycerin 50 mg in D5W 250 ml infusion  0-100 mcg/min Continuous Alisson Escobar, A.P.N.   Stopped at 04/27/18 1445   • oxyCODONE immediate-release (ROXICODONE) tablet 5 mg  5 mg Q3HRS PRN Alisson Escobar, A.P.N.       • oxyCODONE immediate release (ROXICODONE) tablet 10 mg  10 mg Q3HRS PRN Alisson Escobar, A.P.N.       • tramadol (ULTRAM) 50 MG tablet 50 mg  50 mg Q4HRS PRN Alisson Escobar, A.P.N.       • midazolam (VERSED) 2 MG/2ML injection 0.5-2 mg  0.5-2 mg Q HOUR PRN Alisson Escobar, A.P.N.   1 mg at 04/28/18 0604   • sodium bicarbonate 8.4 % injection 50 mEq  50 mEq Q HOUR PRN Alisson Escobar, A.P.N.   50 mEq at 04/27/18 2050   • morphine (pf) 4 mg/ml injection 4 mg  4 mg Q HOUR PRN Alisson Escobar, A.P.N.   4 mg at 04/28/18 0420   • ondansetron (ZOFRAN) syringe/vial injection 4 mg  4 mg Q6HRS PRN Alisson Montanat, A.P.N.        Or   • prochlorperazine (COMPAZINE) injection 10 mg  10 mg Q6HRS PRN Alisson Escobar, A.P.N.        Or   • promethazine (PHENERGAN) suppository 25 mg  25 mg Q6HRS PRN Alisson Montanat, A.P.N.       • acetaminophen (TYLENOL) tablet 650 mg  650 mg Q4HRS PRN Alisson Montanat, A.P.N.        Or   • acetaminophen (TYLENOL) suppository 650 mg  650 mg Q4HRS PRN Alisson Escobar,  A.P.N.       • mag hydrox-al hydrox-simeth (MAALOX PLUS ES or MYLANTA DS) suspension 30 mL  30 mL Q4HRS PRN Alisson Montanat, A.P.N.       • diphenhydrAMINE (BENADRYL) tablet/capsule 25 mg  25 mg HS PRN - MR X 1 Alisson LAshli Montanat, A.P.N.       • EPINEPHrine 1 mg/mL(1:1000) 4 mg in  mL Infusion  0-0.2 mcg/kg/min Continuous Alisson L. Shawn, A.P.N. 8.4 mL/hr at 04/28/18 0144 0.02 mcg/kg/min at 04/28/18 0144   • insulin regular human (HUMULIN/NOVOLIN R) 62.5 Units in  mL infusion per protocol  1-6 Units/hr Continuous Alisson LAshli Montanat, A.P.N. 18 mL/hr at 04/28/18 0632 4.5 Units/hr at 04/28/18 0632    And   • insulin regular (HUMULIN R) injection 0-14 Units  0-14 Units Once Alisson Montanat, A.P.N.   Stopped at 04/27/18 1445    And   • insulin regular (HUMULIN R) injection 0-10 Units  0-10 Units PRN Alisson Montanat, A.P.N.   2 Units at 04/28/18 0202    And   • dextrose 50% (D50W) injection 25 mL  25 mL PRN Alisson Escobar, A.P.N.       • insulin lispro (HUMALOG) injection 0-20 Units  0-20 Units PRN Alisson TYREL Montanat, A.P.N.       • vancomycin 1,700 mg in  mL IVPB  15 mg/kg Q12HR Donald Pike M.D. 250 mL/hr at 04/28/18 0438 1,700 mg at 04/28/18 0438   • MD ALERT... vancomycin per pharmacy protocol   pharmacy to dose Donald Pike M.D.       • dexmedetomidine (PRECEDEX) 400 mcg in  mL infusion  0-1.5 mcg/kg/hr Continuous Alisson TYREL Montanat, A.P.N. 25.2 mL/hr at 04/28/18 0602 0.9 mcg/kg/hr at 04/28/18 0602   • Influenza Vaccine Quad pf injection 0.5 mL  0.5 mL Once PRN Lourdes Boone M.D.       • losartan (COZAAR) tablet 50 mg  50 mg DAILY Adelina Vieyra M.D.   Stopped at 04/27/18 0900   • vancomycin 1500 mg/250mL NS IVPB premix  1,500 mg Once Alisson Escobar, A.P.N.       • Tranexamic Acid (CYKLOKAPRON) 2,000 mg in  mL ivpb  2,000 mg Once Alisson BEE. Shawn, A.P.N.        Followed by   • Tranexamic Acid (CYKLOKAPRON) 3,000 mg in  mL infusion  16 mg/kg/hr Once Alisson Escobar,  A.P.N.       • rosuvastatin (CRESTOR) tablet 20 mg  20 mg Q EVENING Leighton Cook M.D.   Stopped at 04/27/18 2100     Last reviewed on 4/26/2018 12:04 AM by Meme Bhat R.N.    Quality  Measures:  Labs reviewed, Medications reviewed and Radiology images reviewed  Bravo catheter: Critically Ill - Requiring Accurate Measurement of Urinary Output  Central line in place: Need for access    DVT Prophylaxis: Contraindicated - High bleeding risk  DVT prophylaxis - mechanical: SCDs  Ulcer prophylaxis: Yes          Assessment and Plan:    Postoperative ventilator management   - Continue vent support   - Wean as tolerated    Status post 4-vessel coronary bypass grafting - 4/27   - Continue aspirin, Plavix, statin   - Start metoprolol, 12.5 mg twice a day    Status post inferior STEMI   - Status post thrombolytic therapy at outside facility   - Status post emergent angioplasty of subtotally occluded mid circumflex coronary artery   - Continue aspirin, Plavix, statin   - Start metoprolol    Acute systolic heart failure   -Titrate epinephrine drip as tolerated    History of hypertension   - Resume losartan when clinically appropriate    Hypokalemia   - Replete potassium      I have assessed and reassessed his respiratory status with ventilator adjustments and spontaneous breathing trials, ventilator waveforms, airway mechanics, blood pressure, hemodynamics, cardiovascular status with titration of an epinephrine drip and neurologic status with titration of sedation.  He has an increased risk for worsening respiratory and cardiovascular system dysfunction.    High risk of deterioration and worsening vital organ dysfunction and death without the above critical care interventions.    Critical Care Time:  90 minutes  [id number]  No time overlap  Time excludes procedures  Discussed with RN, RT, Team, Clinical pharmacist

## 2018-04-28 NOTE — CONSULTS
DATE OF SERVICE:  04/27/2018    PULMONARY AND CRITICAL CARE NOTE    I was asked to see this patient by Dr. Donald Pkie.    REASON FOR CONSULTATION:  Critical care and ventilator management   post-coronary bypass grafting surgery.    HISTORY OF PRESENT ILLNESS:  Patient is a 48-year-old male who was transferred   in from an outside facility after presenting with chest pain and had an EKG   revealing an ST elevation myocardial infarction.  Dr. Caceres took the patient   to the cardiac catheterization lab late 04/25 and identified severe   multivessel disease.  PTCA was performed of 100% lesion in mid circ.  The   patient had mild LV dysfunction with ejection fraction calculated at 50% with   akinesis of the inferior wall.  Dr. Pike saw the patient in consultation   yesterday and had taken him to the operating room today for coronary bypass   grafting surgery.  A 4-vessel bypass was performed and he was transferred to   the [facility] for recovery.  The patient's LV function was good coming off pump per   anesthesia.  Patient did require epinephrine infusion for hypotension.  He has   been hyperglycemic during the case and an insulin drip was started during the   case.  He was started on dexmedetomidine and transferred to the ICU.  Initial   blood gases revealed significant metabolic acidosis, pH is 7.29 and he is   kept on high support on a ventilator and intravenous bicarbonate has been   administered.  His initial chest x-ray post-procedure revealed some   atelectasis at the left base, primarily in a little vascular plethoric   consistent with possible edema.  Tubes and lines looked good.  He is starting   to wake up to participate.  His temperature is 36.3.  He has had no ectopy,   but his baseline rhythm is left bundle-branch block.    PAST MEDICAL HISTORY:  Significant for systemic hypertension, no other cardiac   risk factors.    PAST SURGICAL HISTORY:  The patient had prior orthopedic surgery.  No other   prior  surgeries.    ALLERGIES:  NKA.    MEDICATIONS:  Home medicine list was reviewed.  He was taking Lopressor and   losartan.    Current active inpatient medication list includes aspirin, clopidogrel, bowel   care protocol, enoxaparin, sliding scale insulin has been replaced by insulin   infusion by drip, sliding scale potassium, losartan.  He received prophylactic   vancomycin, metoprolol, Crestor, tranexamic acid.  He is on dexmedetomidine   now for sedation.    FAMILY HISTORY:  Significant for heart disease and hypertension in father,   maternal grandmother and paternal grandfather.    SOCIAL HISTORY:  The patient lives locally.  He is sedated on the vent and I   cannot elicit a history of tobacco or alcohol use at this time.    REVIEW OF SYSTEMS:  Not obtainable.  The patient is sedated on the ventilator.    PHYSICAL EXAMINATION:  VITAL SIGNS:  T-max last 24 hours 99.7.  Initial vital signs returning from   the OR reveal a systolic blood pressure of 80s-90s, heart rate in the 90s,   sinus rhythm with a left bundle, respiratory rate in the 20s, on %,   functionally riding the ventilator, PEEP was at 8.  Sats are at % on   FIO2 of 100% initially.  The patient is sedate, not following commands.  His   pupils are PERRLA.  No obvious cranial nerve deficit.  He has right IJ central   line.  His trachea is midline.  No subcutaneous air.  He has ET tube orally.  NECK:  Supple.  Difficult to assess for JVD.  Trach is midline.  CHEST:  Reveals a median sternotomy dressing with VAC placed.  He has got 2   Kevin tubes and 2 chest tubes, none of which are pulling any air and there is   minimal serosanguineous fluid drainage.  He has a single pair of V wires in   place and the patent has been tested.  LUNGS:  Reveal clear breath sounds anteriorly.  He has coarse rhonchi at the   left base.  He is mostly clear at the right base.  CARDIAC:  Exam reveals distant heart tones and coarse breath sounds.  A chest   tube is  still in to evacuate all the air from his chest.  There are multiple   pericardial rubs at this time.  ABDOMEN:  The abdomen is a quite but he has bowel sounds. It is nondistended,   nontender.  There is no organomegaly.  No obvious scars, flanks but no   ecchymosis.  EXTREMITIES:  Reveal no cyanosis, clubbing or edema.  He has got a good   capillary refill and good perfusion.  NEUROLOGIC:  Neurologically during the course of evaluating at discharge, he   is spontaneously moving all 4 extremities and did open eyes to voice partially   and did squeeze both hands to request.    IMAGING:  Chest x-ray shows good position of the ET tube, central lines,   multiple chest tubes and Kevin tubes.  He has bibasilar atelectasis, worse on   the right than the left and a little vascular plethora, perihilar with   borderline cardiomegaly.    Initial blood gases on 100%, PEEP 8, , pH 7.29, pCO2 of 38, and pO2 64.    Initial potassium 3.8, ionized calcium 1.11.  Hematocrit was 37.  Initial   white count 28.9 with a platelet count of 195.  Chemistry panel already back,   potassium 3.4.  LFTs are improving with AST down to 107, ALT down to 55 and   alkaline phosphatase down to 59.  Glucose of 199, BUN 14, creatinine 1.04,   anion gap 15, bicarbonate at 19.  Coagulation studies:  INR 1.27, PTT 32.4.    TSH yesterday 2.03.  Echocardiogram yesterday with EF 45% with inferior   hypokinesis and some right ventricular dysfunction.  He had mild concentric   LVH, RVSP estimated at 40, right atrium, mildly enlarged right ventricle, not   well seen, but regurgitant jet was present to evaluate for pulmonary   hypertension.    EKG:  Sinus rhythm, left bundle-branch block, minimal change from recent EKGs.    ASSESSMENT:  1.  Status post 4-vessel coronary bypass grafting surgery 04/27, Dr. Pike.  2.  Status post ST elevated myocardial infarction inferior wall and status   post stenting of mid circumflex lesion by Dr. Caceres.  3.  Ventilator  management postop.  The patient is on ventilator after elective   bypass grafting surgery.  4.  Hypotension postop cardiogenic, on epinephrine.  5.  History of systemic hypertension, antihypertensives.  6.  Abnormal chest x-ray, bilateral left greater than right atelectasis/edema.  7.  Metabolic acidosis with anion gap secondary to above.  8.  Very strong family history of hypertension, coronary artery disease.  9.  EKG abnormal with baseline left bundle-branch block.  10.  Status post lytic therapy at outlying facility prior to transfer here for   PTCA and subsequent coronary artery bypass grafting surgery.  11.  Elevated liver function tests, improving secondary to above.  12.  Leukocytosis, white count markedly elevated at 28.9, presumably secondary   to above.  Nothing to suggest infection at this time.  13.  Hypokalemia postop.    RECOMMENDATIONS AND CLINICAL DECISION MAKING:  Patient is critically ill.    Prognosis is guarded.  We kept on full support at this time and he will be on   it for about 6 hours.  We will switch him from an ASV to APV CMV full support   and rest him overnight.  Intravenous bicarbonates to be administered now for   his metabolic acidosis.  We will titrate the epinephrine to systemic pressures   by A line approximately .  Some dexmedetomidine for sedation and we   will titrate that as needed.  He has received morphine for analgesia.  He will   receive some intravenous magnesium and potassium for his hypokalemia.  He has   received prophylactic antibiotics already and other prophylaxis will be   initiated.  We will get him back on aspirin, Plavix, Lovenox, statin,   beta-blocker and ACE inhibitor as clinically appropriate over the next 24-48   hours.  He will be mobilized as soon as he is stable from a hemodynamic and   respiratory standpoint.  Hope to wean the ventilator in the next 6 hours and   get him extubated.    _____ used initially for hypertension, if necessary we will get  him back on   his own medications.  High dose statin therapy will be initiated in the next   few days when his gut is working well.  He is on insulin infusion now.  Blood   sugars significantly elevated and that will be titrated up per protocols and   we will keep him on insulin infusion for approximately 24 hours and then   transition to sliding scale insulin tomorrow.    Case discussed at great length with the charge nurse and bedside with nursing   staff, respiratory therapist, clinical pharmacist, Dr. Pike, cardiac   anesthesia, Dr. Ann and family.    Thank you for asking me to see this patient.  I will be happy to follow and   assist in his care and the patient is critically ill.  I spent excess of 45   minutes caring for this patient.       ____________________________________     MD WATSON DOTSON / AQUILINO    DD:  04/27/2018 16:41:44  DT:  04/27/2018 19:23:35    D#:  [id number]  Job#:  [id number]

## 2018-04-28 NOTE — PROGRESS NOTES
Updated Alisson, APN on continued acidosis despite 5amps of sodium bicarb.  EKG, additional plasmalyte bolus and chest xray ordered.  Per APN, we will rest on the vent tonight and wean in the a.m.  Dr. Encinas notified.

## 2018-04-28 NOTE — CARE PLAN
Problem: Ventilation Defect:  Goal: Ability to achieve and maintain unassisted ventilation or tolerate decreased levels of ventilator support  Outcome: PROGRESSING AS EXPECTED    Intervention: Support and monitor invasive and noninvasive mechanical ventilation  Adult Ventilation Update    Total Vent Days: 2    Patient Lines/Drains/Airways Status    Active Airway     Name: Placement date: Placement time: Site: Days:    Airway Group ET Tube Oral 8.5 04/27/18   0805   Oral   2              Vent settings: 20 500 +8 50%    Sputum/Suction   Cough: Non Productive (04/28/18 0000)  Sputum Amount: Small (04/28/18 0000)  Sputum Color: Clear (04/28/18 0000)  Sputum Consistency: Thin (04/28/18 0000)        Events/Summary/Plan: Abg drawn, decreased fio2 to 50%. Pt stable on vent, will continue to monitor.

## 2018-04-28 NOTE — DIETARY
Nutrition Services:   Consult received for cardiac diet ed. Pt is currently on the vent. RD will follow up for diet education, when appropriate prior to discharge.     RD available prn.

## 2018-04-28 NOTE — PROGRESS NOTES
Cardiovascular Surgery Progress Note    Name: Jae Venegas  MRN: [id number]  : 1970  Admit Date: 2018 10:33 PM  Procedure:  Procedure(s) and Anesthesia Type:     * MULTIPLE CORONARY ARTERY BYPASS ENDO VEIN HARVEST - X4, BILATERAL INTERNAL MAMMARY ARTERY HARVEST - General     * [place] - General  1 Day Post-Op    Vitals:  Patient Vitals for the past 8 hrs:   Monitored Temp 2 SpO2 O2 Delivery Pulse Heart Rate (Monitored) Resp NIBP Weight FiO2%   18 0650 - 99 % - - 73 - - - -   18 0630 37.7 °C (99.9 °F) 98 % - 77 77 20 (!) 84/64 - -   18 0600 37.8 °C (100 °F) 99 % - 77 77 20 (!) 87/72 - -   18 0530 37.8 °C (100 °F) 98 % - 79 79 20 (!) 95/69 - -   18 0500 37.8 °C (100 °F) 98 % - 76 76 20 (!) 91/ - -   18 0430 37.8 °C (100 °F) 98 % - 74 74 20 (!) 89/65 - -   18 0415 37.7 °C (99.9 °F) 98 % - 72 72 20 107/68 - -   18 0400 37.7 °C (99.9 °F) 99 % Ventilator 68 68 20 104/68 116.6 kg (257 lb 0.9 oz) 50 %   18 0330 37.7 °C (99.9 °F) 98 % - 73 73 20 (!) 94/68 - -   18 0315 37.7 °C (99.9 °F) 96 % - 75 74 20 (!) 99/65 - -   18 0312 - 96 % - - 75 - - - -   18 0300 37.7 °C (99.9 °F) 97 % - 74 74 (!) 26 (!) / - -   18 0230 37.7 °C (99.9 °F) 96 % - 79 79 20 101/67 - -   18 0217 - 98 % - - 78 - - - -   18 0200 37.7 °C (99.9 °F) 98 % - 80 80 20 (!) 96/65 - -   18 0130 37.7 °C (99.9 °F) 97 % - 81 81 20 104/69 - -   18 0100 37.8 °C (100 °F) 96 % - 84 83 20 108/71 - -     No data recorded.      Respiratory:  Berkowitz Vent Mode: APVCMV, Rate (breaths/min): 20, PEEP/CPAP: 8, FiO2: 50, P Peak (PIP): 25, P MEAN: 13 Respiration: 20, Pulse Oximetry: 99 %  Chest Tube Group 1 (A) Mediastinal round-Kevin 24-Tube Status / Drainage: Draining;Patent;Sutured in Place;Small;Sanguinous, Chest Tube Group 2 (B) Mediastinal round-Kevin 24-Tube Status / Drainage: Draining;Patent;Sutured in Place;Small;Sanguinous, Chest Tube Group 3 (C)  Mediastinal;Right;Left angled and straight 32-Tube Status / Drainage: Draining;Patent;Sutured in Place;Small;Sanguinous, Chest Tube Group 1 (A) Mediastinal round-Kevin 24-Device: Dry Closed Drainage System;Suction 20 cm Water, Chest Tube Group 2 (B) Mediastinal round-Kevin 24-Device: Dry Closed Drainage System;Suction 20 cm Water, Chest Tube Group 3 (C) Mediastinal;Right;Left angled and straight 32-Device: Dry Closed Drainage System;Suction 20 cm Water  Chest Tube Drains:          Fluids:    Intake/Output Summary (Last 24 hours) at 04/28/18 0853  Last data filed at 04/28/18 0700   Gross per 24 hour   Intake           8691.1 ml   Output             4291 ml   Net           4400.1 ml     Admit weight: Weight: 108.9 kg (240 lb)  Current weight: Weight: 116.6 kg (257 lb 0.9 oz) (04/28/18 0400)    Labs:  Recent Labs      04/26/18   1513  04/27/18   1430  04/28/18   0510   WBC  9.7  28.9*  14.3*   RBC  4.87  4.67*  4.17*   HEMOGLOBIN  15.1  14.5  13.0*   HEMATOCRIT  42.3  41.1*  35.7*   MCV  86.9  88.0  85.6   MCH  31.0  31.0  31.2   MCHC  35.7*  35.3  36.4*   RDW  39.6  39.7  38.2   PLATELETCT  148*  195  121*   MPV  10.5  10.3  10.3     Recent Labs      04/26/18   1513  04/27/18   1430  04/28/18   0510   NEUTSPOLYS  71.40  74.80*  77.00*   LYMPHOCYTES  16.80*  12.20*  8.50*   MONOCYTES  10.10  13.00  13.90*   EOSINOPHILS  1.20  0.00  0.00   BASOPHILS  0.20  0.00  0.20   RBCMORPHOLO   --   Normal   --      Recent Labs      04/27/18   1430  04/27/18   1840  04/28/18   0510   SODIUM  137  142  142   POTASSIUM  3.4*  3.0*  3.7   CHLORIDE  103  105  111   CO2  19*  14*  25   GLUCOSE  199*  249*  90   BUN  14  16  14   CREATININE  1.04  1.41*  0.80   CALCIUM  7.7*  7.5*  7.7*     Recent Labs      04/25/18 2237 04/26/18   1530  04/26/18   2245  04/27/18   1430   APTT  29.1   < >  63.2*  49.6*  32.4   INR  1.01   --   1.08   --   1.27*    < > = values in this interval not displayed.       Medications:  • docusate sodium  100  mg      And   • senna-docusate  1 Tab     • enoxaparin  40 mg     • mupirocin  1 Application     • magnesium sulfate  1 g Stopped (04/27/18 1644)   • metoprolol  12.5 mg      Followed by   • [START ON 4/29/2018] metoprolol  25 mg     • aspirin EC  81 mg     • clopidogrel  75 mg     • insulin regular  0-14 Units     • vancomycin  15 mg/kg Stopped (04/28/18 0638)   • MD ALERT... vancomycin       • losartan  50 mg     • rosuvastatin  20 mg         Exam:   Review of Systems   Unable to perform ROS: Intubated       Physical Exam   Constitutional: He appears well-developed and well-nourished.   intubated   HENT:   Head: Normocephalic.   Eyes: Pupils are equal, round, and reactive to light.   Neck: Normal range of motion. No JVD present.   Cardiovascular: Normal rate, regular rhythm and normal heart sounds.    Pulmonary/Chest: Effort normal and breath sounds normal.   Intubated   Abdominal: Soft. Bowel sounds are normal. He exhibits no distension. There is no guarding.   Genitourinary:   Genitourinary Comments: gill   Musculoskeletal: Normal range of motion. He exhibits edema.   Neurological: He is alert.   Skin: Skin is warm and dry.   prevena to chest, EVH sites CDI   Psychiatric:   SALLY       Quality Measures:   Quality-Core Measures   Gill catheter::  One or Two Days Post Surgery (Day of Surgery being Day 0)  Central line in place:  Need for access  DVT prophylaxis pharmacological::  Contraindicated - High bleeding risk  DVT prophylaxis - mechanical:  SCDs  Ulcer Prophylaxis::  Yes      Assessment/Plan:  POD 1 HDS on low dose epi.  NSR. Vented, ABGs good, weaning to extubated this AM. CT output min, no airlekas.  Will keep CTs/gill today.  Plan to start diuresis once BP stable off epi.  Continuing ABX x 72 hours for bilateral ELIANE harvests.  CPM.    Active Hospital Problems    Diagnosis   • STEMI (ST elevation myocardial infarction) (HCC) [I21.3]     Priority: High   • HTN (hypertension) [I10]

## 2018-04-28 NOTE — RESPIRATORY CARE
Extubation    Cuff leak noted yes  Stridor present no         O2 (LPM): 6 (04/28/18 1423)  O2 Daily Delivery Respiratory : Silicone Nasal Cannula (04/28/18 1423)  Patient toleration good  RCP Complete? yes  Events/Summary/Plan: extubated to 6 LPM cannula (04/28/18 1423)

## 2018-04-28 NOTE — CARE PLAN
Problem: Day of surgery post CABG/Heart valve replacement  Goal: Stabilization in immediate post op period    Intervention: Chest tube to 20 cm suction, record CT drainage with VS  Verified.      Problem: Post Op Day 1 CABG/Heart Valve Replacement  Goal: Optimal care of the post op CABG/heart valve replacement Post Op Day 1    Intervention: EKG and CXR completed  done  Intervention: Daily Weights  Charted.  Intervention: Ambulate in am if stable. First ambulation is 25 feet. Repeat x 3 as tolerated.  Ambulate again before bed.  Vented overnight.

## 2018-04-29 LAB
ALBUMIN SERPL BCP-MCNC: 2.9 G/DL (ref 3.2–4.9)
ALBUMIN/GLOB SERPL: 1.2 G/DL
ALP SERPL-CCNC: 50 U/L (ref 30–99)
ALT SERPL-CCNC: 45 U/L (ref 2–50)
ANION GAP SERPL CALC-SCNC: 5 MMOL/L (ref 0–11.9)
AST SERPL-CCNC: 93 U/L (ref 12–45)
BASOPHILS # BLD AUTO: 0.1 % (ref 0–1.8)
BASOPHILS # BLD: 0.02 K/UL (ref 0–0.12)
BILIRUB SERPL-MCNC: 3.5 MG/DL (ref 0.1–1.5)
BUN SERPL-MCNC: 24 MG/DL (ref 8–22)
CALCIUM SERPL-MCNC: 8.3 MG/DL (ref 8.5–10.5)
CHLORIDE SERPL-SCNC: 102 MMOL/L (ref 96–112)
CO2 SERPL-SCNC: 28 MMOL/L (ref 20–33)
CREAT SERPL-MCNC: 1.08 MG/DL (ref 0.5–1.4)
EKG IMPRESSION: NORMAL
EOSINOPHIL # BLD AUTO: 0.01 K/UL (ref 0–0.51)
EOSINOPHIL NFR BLD: 0.1 % (ref 0–6.9)
ERYTHROCYTE [DISTWIDTH] IN BLOOD BY AUTOMATED COUNT: 43.8 FL (ref 35.9–50)
GLOBULIN SER CALC-MCNC: 2.5 G/DL (ref 1.9–3.5)
GLUCOSE BLD-MCNC: 106 MG/DL (ref 65–99)
GLUCOSE BLD-MCNC: 107 MG/DL (ref 65–99)
GLUCOSE SERPL-MCNC: 113 MG/DL (ref 65–99)
HCT VFR BLD AUTO: 34.2 % (ref 42–52)
HGB BLD-MCNC: 11.6 G/DL (ref 14–18)
IMM GRANULOCYTES # BLD AUTO: 0.05 K/UL (ref 0–0.11)
IMM GRANULOCYTES NFR BLD AUTO: 0.4 % (ref 0–0.9)
LYMPHOCYTES # BLD AUTO: 1.09 K/UL (ref 1–4.8)
LYMPHOCYTES NFR BLD: 7.9 % (ref 22–41)
MAGNESIUM SERPL-MCNC: 2.2 MG/DL (ref 1.5–2.5)
MCH RBC QN AUTO: 30.7 PG (ref 27–33)
MCHC RBC AUTO-ENTMCNC: 33.9 G/DL (ref 33.7–35.3)
MCV RBC AUTO: 90.5 FL (ref 81.4–97.8)
MONOCYTES # BLD AUTO: 1.73 K/UL (ref 0–0.85)
MONOCYTES NFR BLD AUTO: 12.5 % (ref 0–13.4)
NEUTROPHILS # BLD AUTO: 10.98 K/UL (ref 1.82–7.42)
NEUTROPHILS NFR BLD: 79 % (ref 44–72)
NRBC # BLD AUTO: 0 K/UL
NRBC BLD-RTO: 0 /100 WBC
PLATELET # BLD AUTO: 102 K/UL (ref 164–446)
PMV BLD AUTO: 10.4 FL (ref 9–12.9)
POTASSIUM SERPL-SCNC: 4.6 MMOL/L (ref 3.6–5.5)
PROT SERPL-MCNC: 5.4 G/DL (ref 6–8.2)
RBC # BLD AUTO: 3.78 M/UL (ref 4.7–6.1)
SODIUM SERPL-SCNC: 135 MMOL/L (ref 135–145)
WBC # BLD AUTO: 13.9 K/UL (ref 4.8–10.8)

## 2018-04-29 PROCEDURE — 85025 COMPLETE CBC W/AUTO DIFF WBC: CPT

## 2018-04-29 PROCEDURE — 700102 HCHG RX REV CODE 250 W/ 637 OVERRIDE(OP): Performed by: HOSPITALIST

## 2018-04-29 PROCEDURE — A9270 NON-COVERED ITEM OR SERVICE: HCPCS | Performed by: NURSE PRACTITIONER

## 2018-04-29 PROCEDURE — A9270 NON-COVERED ITEM OR SERVICE: HCPCS | Performed by: INTERNAL MEDICINE

## 2018-04-29 PROCEDURE — 80053 COMPREHEN METABOLIC PANEL: CPT

## 2018-04-29 PROCEDURE — 82962 GLUCOSE BLOOD TEST: CPT | Mod: 91

## 2018-04-29 PROCEDURE — 700112 HCHG RX REV CODE 229: Performed by: NURSE PRACTITIONER

## 2018-04-29 PROCEDURE — 93010 ELECTROCARDIOGRAM REPORT: CPT | Performed by: INTERNAL MEDICINE

## 2018-04-29 PROCEDURE — 700102 HCHG RX REV CODE 250 W/ 637 OVERRIDE(OP): Performed by: INTERNAL MEDICINE

## 2018-04-29 PROCEDURE — 700111 HCHG RX REV CODE 636 W/ 250 OVERRIDE (IP): Performed by: THORACIC SURGERY (CARDIOTHORACIC VASCULAR SURGERY)

## 2018-04-29 PROCEDURE — A9270 NON-COVERED ITEM OR SERVICE: HCPCS | Performed by: HOSPITALIST

## 2018-04-29 PROCEDURE — 83735 ASSAY OF MAGNESIUM: CPT

## 2018-04-29 PROCEDURE — 700111 HCHG RX REV CODE 636 W/ 250 OVERRIDE (IP): Performed by: INTERNAL MEDICINE

## 2018-04-29 PROCEDURE — 770020 HCHG ROOM/CARE - TELE (206)

## 2018-04-29 PROCEDURE — 93005 ELECTROCARDIOGRAM TRACING: CPT | Performed by: NURSE PRACTITIONER

## 2018-04-29 PROCEDURE — 700102 HCHG RX REV CODE 250 W/ 637 OVERRIDE(OP): Performed by: NURSE PRACTITIONER

## 2018-04-29 PROCEDURE — 700111 HCHG RX REV CODE 636 W/ 250 OVERRIDE (IP): Performed by: NURSE PRACTITIONER

## 2018-04-29 PROCEDURE — 94668 MNPJ CHEST WALL SBSQ: CPT

## 2018-04-29 PROCEDURE — 700105 HCHG RX REV CODE 258: Performed by: THORACIC SURGERY (CARDIOTHORACIC VASCULAR SURGERY)

## 2018-04-29 PROCEDURE — 700105 HCHG RX REV CODE 258: Performed by: NURSE PRACTITIONER

## 2018-04-29 RX ORDER — AMIODARONE HYDROCHLORIDE 200 MG/1
400 TABLET ORAL 2 TIMES DAILY
Status: DISCONTINUED | OUTPATIENT
Start: 2018-04-29 | End: 2018-05-08 | Stop reason: HOSPADM

## 2018-04-29 RX ORDER — METOPROLOL TARTRATE 100 MG/1
100 TABLET ORAL
Status: ON HOLD | COMMUNITY
Start: 2017-08-17 | End: 2018-04-29 | Stop reason: CLARIF

## 2018-04-29 RX ORDER — FUROSEMIDE 10 MG/ML
20 INJECTION INTRAMUSCULAR; INTRAVENOUS
Status: DISCONTINUED | OUTPATIENT
Start: 2018-04-29 | End: 2018-04-30

## 2018-04-29 RX ORDER — HYDROMORPHONE HYDROCHLORIDE 2 MG/1
2 TABLET ORAL
Status: DISCONTINUED | OUTPATIENT
Start: 2018-04-29 | End: 2018-05-01

## 2018-04-29 RX ORDER — LOSARTAN POTASSIUM 50 MG/1
50 TABLET ORAL
Status: ON HOLD | COMMUNITY
Start: 2017-08-17 | End: 2018-04-29 | Stop reason: CLARIF

## 2018-04-29 RX ORDER — POTASSIUM CHLORIDE 20 MEQ/1
10 TABLET, EXTENDED RELEASE ORAL 2 TIMES DAILY
Status: DISCONTINUED | OUTPATIENT
Start: 2018-04-29 | End: 2018-04-30

## 2018-04-29 RX ADMIN — ALTEPLASE 2 MG: 2.2 INJECTION, POWDER, LYOPHILIZED, FOR SOLUTION INTRAVENOUS at 19:36

## 2018-04-29 RX ADMIN — MORPHINE SULFATE 15 MG: 15 TABLET, EXTENDED RELEASE ORAL at 21:06

## 2018-04-29 RX ADMIN — OXYCODONE HYDROCHLORIDE 10 MG: 10 TABLET ORAL at 17:41

## 2018-04-29 RX ADMIN — AMIODARONE HYDROCHLORIDE 150 MG: 50 INJECTION, SOLUTION INTRAVENOUS at 15:15

## 2018-04-29 RX ADMIN — AMIODARONE HYDROCHLORIDE 1 MG/MIN: 50 INJECTION, SOLUTION INTRAVENOUS at 06:33

## 2018-04-29 RX ADMIN — ACETAMINOPHEN 650 MG: 325 TABLET, FILM COATED ORAL at 00:35

## 2018-04-29 RX ADMIN — OXYCODONE HYDROCHLORIDE 10 MG: 10 TABLET ORAL at 08:50

## 2018-04-29 RX ADMIN — ALTEPLASE 2 MG: 2.2 INJECTION, POWDER, LYOPHILIZED, FOR SOLUTION INTRAVENOUS at 19:37

## 2018-04-29 RX ADMIN — AMIODARONE HYDROCHLORIDE 400 MG: 200 TABLET ORAL at 17:41

## 2018-04-29 RX ADMIN — AMIODARONE HYDROCHLORIDE 150 MG: 50 INJECTION, SOLUTION INTRAVENOUS at 06:33

## 2018-04-29 RX ADMIN — METOPROLOL TARTRATE 25 MG: 25 TABLET, FILM COATED ORAL at 13:56

## 2018-04-29 RX ADMIN — HYDROMORPHONE HYDROCHLORIDE 2 MG: 2 TABLET ORAL at 12:17

## 2018-04-29 RX ADMIN — FUROSEMIDE 20 MG: 10 INJECTION, SOLUTION INTRAMUSCULAR; INTRAVENOUS at 15:34

## 2018-04-29 RX ADMIN — METOPROLOL TARTRATE 25 MG: 25 TABLET, FILM COATED ORAL at 19:35

## 2018-04-29 RX ADMIN — STANDARDIZED SENNA CONCENTRATE AND DOCUSATE SODIUM 1 TABLET: 8.6; 5 TABLET, FILM COATED ORAL at 19:34

## 2018-04-29 RX ADMIN — MUPIROCIN 1 APPLICATION: 20 OINTMENT TOPICAL at 19:34

## 2018-04-29 RX ADMIN — FAMOTIDINE 20 MG: 20 TABLET ORAL at 08:51

## 2018-04-29 RX ADMIN — TRAMADOL HYDROCHLORIDE 50 MG: 50 TABLET, COATED ORAL at 11:45

## 2018-04-29 RX ADMIN — ACETAMINOPHEN 650 MG: 325 TABLET, FILM COATED ORAL at 08:50

## 2018-04-29 RX ADMIN — AMIODARONE HYDROCHLORIDE 1 MG/MIN: 50 INJECTION, SOLUTION INTRAVENOUS at 23:31

## 2018-04-29 RX ADMIN — LOSARTAN POTASSIUM 50 MG: 25 TABLET, FILM COATED ORAL at 11:45

## 2018-04-29 RX ADMIN — MAGNESIUM SULFATE IN DEXTROSE 1 G: 10 INJECTION, SOLUTION INTRAVENOUS at 13:51

## 2018-04-29 RX ADMIN — ROSUVASTATIN CALCIUM 20 MG: 10 TABLET, FILM COATED ORAL at 19:33

## 2018-04-29 RX ADMIN — DOCUSATE SODIUM 100 MG: 100 CAPSULE ORAL at 08:51

## 2018-04-29 RX ADMIN — DIPHENHYDRAMINE HCL 25 MG: 25 TABLET ORAL at 00:35

## 2018-04-29 RX ADMIN — MUPIROCIN 1 APPLICATION: 20 OINTMENT TOPICAL at 11:45

## 2018-04-29 RX ADMIN — CLOPIDOGREL 75 MG: 75 TABLET, FILM COATED ORAL at 08:51

## 2018-04-29 RX ADMIN — ASPIRIN 81 MG: 81 TABLET, COATED ORAL at 08:51

## 2018-04-29 RX ADMIN — OXYCODONE HYDROCHLORIDE 10 MG: 10 TABLET ORAL at 05:04

## 2018-04-29 RX ADMIN — POTASSIUM CHLORIDE 10 MEQ: 1500 TABLET, EXTENDED RELEASE ORAL at 08:51

## 2018-04-29 RX ADMIN — TRAMADOL HYDROCHLORIDE 50 MG: 50 TABLET, COATED ORAL at 03:25

## 2018-04-29 RX ADMIN — FUROSEMIDE 20 MG: 10 INJECTION, SOLUTION INTRAMUSCULAR; INTRAVENOUS at 08:53

## 2018-04-29 RX ADMIN — OXYCODONE HYDROCHLORIDE 10 MG: 10 TABLET ORAL at 13:51

## 2018-04-29 RX ADMIN — MORPHINE SULFATE 15 MG: 15 TABLET, EXTENDED RELEASE ORAL at 07:51

## 2018-04-29 RX ADMIN — HYDROMORPHONE HYDROCHLORIDE 2 MG: 2 TABLET ORAL at 15:34

## 2018-04-29 RX ADMIN — VANCOMYCIN HYDROCHLORIDE 1700 MG: 100 INJECTION, POWDER, LYOPHILIZED, FOR SOLUTION INTRAVENOUS at 04:42

## 2018-04-29 RX ADMIN — POTASSIUM CHLORIDE 10 MEQ: 1500 TABLET, EXTENDED RELEASE ORAL at 19:33

## 2018-04-29 RX ADMIN — OXYCODONE HYDROCHLORIDE 10 MG: 10 TABLET ORAL at 00:35

## 2018-04-29 RX ADMIN — TRAMADOL HYDROCHLORIDE 50 MG: 50 TABLET, COATED ORAL at 07:51

## 2018-04-29 ASSESSMENT — PAIN SCALES - GENERAL
PAINLEVEL_OUTOF10: 6
PAINLEVEL_OUTOF10: 5
PAINLEVEL_OUTOF10: 6
PAINLEVEL_OUTOF10: 2
PAINLEVEL_OUTOF10: 5
PAINLEVEL_OUTOF10: 5
PAINLEVEL_OUTOF10: 4
PAINLEVEL_OUTOF10: 6
PAINLEVEL_OUTOF10: 5
PAINLEVEL_OUTOF10: 6
PAINLEVEL_OUTOF10: 5
PAINLEVEL_OUTOF10: 2
PAINLEVEL_OUTOF10: 7

## 2018-04-29 ASSESSMENT — ENCOUNTER SYMPTOMS
DOUBLE VISION: 0
CONSTITUTIONAL NEGATIVE: 1
PSYCHIATRIC NEGATIVE: 1
STRIDOR: 0
BLURRED VISION: 0
NEUROLOGICAL NEGATIVE: 1
WHEEZING: 0
MUSCULOSKELETAL NEGATIVE: 1
RESPIRATORY NEGATIVE: 1
EYES NEGATIVE: 1
GASTROINTESTINAL NEGATIVE: 1
FEVER: 0

## 2018-04-29 ASSESSMENT — PATIENT HEALTH QUESTIONNAIRE - PHQ9
2. FEELING DOWN, DEPRESSED, IRRITABLE, OR HOPELESS: NOT AT ALL
SUM OF ALL RESPONSES TO PHQ9 QUESTIONS 1 AND 2: 0
1. LITTLE INTEREST OR PLEASURE IN DOING THINGS: NOT AT ALL

## 2018-04-29 NOTE — PROGRESS NOTES
OPAL Vinson notified of elevated temperature and pan cultures being completed and CXR per protocol.

## 2018-04-29 NOTE — CARE PLAN
Problem: Hyperinflation:  Goal: Prevent or improve atelectasis  Respiratory Therapy Update    Interdisciplinary Plan of Care-Goals (Indications)  Hyperinflation Protocol Indications: Chest Trauma (Blunt, Penetrative, or Surgical);Atelectasis Documented by Chest X-Ray (04/28/18 1612)  Interdisciplinary Plan of Care-Outcomes   Hyperinflation Protocol Goals/Outcome: Absences of or Improvement in Signs of Atelectasis;Increased Vital Capacity or Return to Pre-operative Values (04/28/18 1612)    #PEP/CPT (Manual) Initial: Initial (04/28/18 1612)          Cough: Moist;Non Productive;Splinted (04/28/18 1612)  Sputum Amount: Small (04/28/18 1423)  Sputum Color: White;Yellow (04/28/18 1423)  Sputum Consistency: Thin (04/28/18 1423)             O2 (LPM): 5 (04/28/18 1612)  O2 Daily Delivery Respiratory : Silicone Nasal Cannula (04/28/18 1612)    Breath Sounds  Pre/Post Intervention: Pre Intervention Assessment (04/27/18 1439)  RUL Breath Sounds: Clear (04/28/18 1612)  RML Breath Sounds: Clear;Diminished (04/28/18 1612)  RLL Breath Sounds: Diminished (04/28/18 1612)  LAKSHMI Breath Sounds: Clear (04/28/18 1612)  LLL Breath Sounds: Diminished (04/28/18 1612)    Therapy changed to extubated 1423, IS volume low, resp rate high, PEP initiated  Events/Summary/Plan: protocol done, respiratory rate >24 (04/28/18 1612)

## 2018-04-29 NOTE — PROGRESS NOTES
Cardiovascular Surgery Progress Note    Name: Jae Venegas  MRN: [id number]  : 1970  Admit Date: 2018 10:33 PM  Procedure:  Procedure(s) and Anesthesia Type:     * MULTIPLE CORONARY ARTERY BYPASS ENDO VEIN HARVEST - X4, BILATERAL INTERNAL MAMMARY ARTERY HARVEST - General     * [place] - General  2 Day Post-Op    Vitals:  Patient Vitals for the past 8 hrs:   Temp Monitored Temp 2 SpO2 O2 Delivery O2 (LPM) Pulse Heart Rate (Monitored) Resp NIBP Weight   18 0805 37.2 °C (99 °F) - - - - - - - - -   18 0800 36.3 °C (97.3 °F) - 93 % - - 88 88 13 103/73 -   18 0700 - - 97 % - - 88 88 (!) 26 (!) 93/84 -   18 0645 - - 95 % - - 88 (!) 169 (!) 31 104/75 -   18 0636 - - 96 % - - 63 (!) 166 (!) 22 104/75 -   18 0630 - - 95 % - - 61 (!) 193 (!) 0 124/87 -   18 0615 - 37.8 °C (100 °F) (!) 68 % - - 61 (!) 178 (!) 32 137/121 -   18 0600 - 37.7 °C (99.9 °F) 89 % Silicone Nasal Cannula 2 93 93 (!) 23 108/71 114.4 kg (252 lb 3.3 oz)   18 0500 - 37.8 °C (100 °F) 91 % - - 91 91 20 109/69 -     Temp (24hrs), Av.7 °C (99.9 °F), Min:36.3 °C (97.3 °F), Max:38.5 °C (101.3 °F)      Respiratory:  Berkowitz Vent Mode: Spont, PEEP/CPAP: 8, FiO2: 50 Respiration: 13, Pulse Oximetry: 93 %, O2 Daily Delivery Respiratory : Silicone Nasal Cannula  Chest Tube Group 1 (A) Mediastinal round-Kevin 24-Tube Status / Drainage: Draining;Patent;Sutured in Place;Small;Sanguinous, Chest Tube Group 2 (B) Mediastinal round-Kevin 24-Tube Status / Drainage: Draining;Patent;Sutured in Place;Small;Sanguinous, Chest Tube Group 3 (C) Mediastinal;Right;Left angled and straight 32-Tube Status / Drainage: Draining;Patent;Sutured in Place;Small;Sanguinous, Chest Tube Group 1 (A) Mediastinal round-Kevin 24-Device: Dry Closed Drainage System;Suction 20 cm Water, Chest Tube Group 2 (B) Mediastinal round-Kevni 24-Device: Dry Closed Drainage System;Suction 20 cm Water, Chest Tube Group 3 (C) Mediastinal;Right;Left  angled and straight 32-Device: Dry Closed Drainage System;Suction 20 cm Water  Chest Tube Drains:          Fluids:    Intake/Output Summary (Last 24 hours) at 04/29/18 1235  Last data filed at 04/29/18 0900   Gross per 24 hour   Intake              800 ml   Output             1390 ml   Net             -590 ml     Admit weight: Weight: 108.9 kg (240 lb)  Current weight: Weight: 114.4 kg (252 lb 3.3 oz) (04/29/18 0600)    Labs:  Recent Labs      04/27/18   1430  04/28/18   0510  04/29/18   0417   WBC  28.9*  14.3*  13.9*   RBC  4.67*  4.17*  3.78*   HEMOGLOBIN  14.5  13.0*  11.6*   HEMATOCRIT  41.1*  35.7*  34.2*   MCV  88.0  85.6  90.5   MCH  31.0  31.2  30.7   MCHC  35.3  36.4*  33.9   RDW  39.7  38.2  43.8   PLATELETCT  195  121*  102*   MPV  10.3  10.3  10.4     Recent Labs      04/27/18   1430  04/28/18   0510  04/29/18   0417   NEUTSPOLYS  74.80*  77.00*  79.00*   LYMPHOCYTES  12.20*  8.50*  7.90*   MONOCYTES  13.00  13.90*  12.50   EOSINOPHILS  0.00  0.00  0.10   BASOPHILS  0.00  0.20  0.10   RBCMORPHOLO  Normal   --    --      Recent Labs      04/27/18   1840  04/28/18   0510  04/29/18   0417   SODIUM  142  142  135   POTASSIUM  3.0*  3.7  4.6   CHLORIDE  105  111  102   CO2  14*  25  28   GLUCOSE  249*  90  113*   BUN  16  14  24*   CREATININE  1.41*  0.80  1.08   CALCIUM  7.5*  7.7*  8.3*     Recent Labs      04/26/18   1530  04/26/18   2245  04/27/18   1430   APTT  63.2*  49.6*  32.4   INR  1.08   --   1.27*       Medications:  • amiodarone  400 mg     • furosemide  20 mg     • potassium chloride SA  10 mEq     • morphine ER  15 mg     • docusate sodium  100 mg      And   • senna-docusate  1 Tab     • enoxaparin  40 mg     • mupirocin  1 Application     • magnesium sulfate  1 g Stopped (04/28/18 8692)   • metoprolol  25 mg     • aspirin EC  81 mg     • clopidogrel  75 mg     • MD ALERT... vancomycin       • losartan  50 mg     • rosuvastatin  20 mg         Exam:   Review of Systems   Constitutional:  Negative.    HENT: Negative.    Eyes: Negative.    Respiratory: Negative.    Cardiovascular: Positive for chest pain ([facility]).   Gastrointestinal: Negative.    Genitourinary: Negative.    Musculoskeletal: Negative.    Skin: Negative.    Neurological: Negative.    Endo/Heme/Allergies: Negative.    Psychiatric/Behavioral: Negative.        Physical Exam   Constitutional: He is oriented to person, place, and time. He appears well-developed and well-nourished.   HENT:   Head: Normocephalic.   Eyes: Pupils are equal, round, and reactive to light.   Neck: Normal range of motion. No JVD present.   Cardiovascular: Normal rate, regular rhythm and normal heart sounds.    Pulmonary/Chest: Effort normal and breath sounds normal.   Dim bases, shallow breaths   Abdominal: Soft. Bowel sounds are normal. He exhibits no distension. There is no guarding.   Genitourinary:   Genitourinary Comments: gill   Musculoskeletal: Normal range of motion. He exhibits edema.   Neurological: He is alert and oriented to person, place, and time.   Skin: Skin is warm and dry.   prevena to chest, EVH sites CDI   Psychiatric: He has a normal mood and affect. His behavior is normal. Judgment and thought content normal.       Quality Measures:   Quality-Core Measures   Gill catheter::  One or Two Days Post Surgery (Day of Surgery being Day 0)  Central line in place:  Need for access  DVT prophylaxis pharmacological::  Contraindicated - High bleeding risk  DVT prophylaxis - mechanical:  SCDs  Ulcer Prophylaxis::  Yes    Cardiovascular Surgery Progress Note    Name: Jae Venegas  MRN: [id number]  : 1970  Admit Date: 2018 10:33 PM  Procedure:  Procedure(s) and Anesthesia Type:     * MULTIPLE CORONARY ARTERY BYPASS ENDO VEIN HARVEST - X4, BILATERAL INTERNAL MAMMARY ARTERY HARVEST - General     * [place] - General  1 Day Post-Op    Vitals:  Patient Vitals for the past 8 hrs:   Temp Monitored Temp 2 SpO2 O2 Delivery O2 (LPM) Pulse Heart Rate (Monitored) Resp  NIBP Weight   18 0805 37.2 °C (99 °F) - - - - - - - - -   18 0800 36.3 °C (97.3 °F) - 93 % - - 88 88 13 103/73 -   18 0700 - - 97 % - - 88 88 (!) 26 (!) 93/84 -   18 0645 - - 95 % - - 88 (!) 169 (!) 31 104/75 -   18 0636 - - 96 % - - 63 (!) 166 (!) 22 104/75 -   18 0630 - - 95 % - - 61 (!) 193 (!) 0 124/87 -   18 0615 - 37.8 °C (100 °F) (!) 68 % - - 61 (!) 178 (!) 32 137/121 -   18 0600 - 37.7 °C (99.9 °F) 89 % Silicone Nasal Cannula 2 93 93 (!) 23 108/71 114.4 kg (252 lb 3.3 oz)   18 0500 - 37.8 °C (100 °F) 91 % - - 91 91 20 109/69 -     Temp (24hrs), Av.7 °C (99.9 °F), Min:36.3 °C (97.3 °F), Max:38.5 °C (101.3 °F)      Respiratory:  Berkowitz Vent Mode: Spont, PEEP/CPAP: 8, FiO2: 50 Respiration: 13, Pulse Oximetry: 93 %, O2 Daily Delivery Respiratory : Silicone Nasal Cannula  Chest Tube Group 1 (A) Mediastinal round-Kevin 24-Tube Status / Drainage: Draining;Patent;Sutured in Place;Small;Sanguinous, Chest Tube Group 2 (B) Mediastinal round-Kevin 24-Tube Status / Drainage: Draining;Patent;Sutured in Place;Small;Sanguinous, Chest Tube Group 3 (C) Mediastinal;Right;Left angled and straight 32-Tube Status / Drainage: Draining;Patent;Sutured in Place;Small;Sanguinous, Chest Tube Group 1 (A) Mediastinal round-Kevin 24-Device: Dry Closed Drainage System;Suction 20 cm Water, Chest Tube Group 2 (B) Mediastinal round-Kevin 24-Device: Dry Closed Drainage System;Suction 20 cm Water, Chest Tube Group 3 (C) Mediastinal;Right;Left angled and straight 32-Device: Dry Closed Drainage System;Suction 20 cm Water  Chest Tube Drains:          Fluids:    Intake/Output Summary (Last 24 hours) at 18 1235  Last data filed at 18 0900   Gross per 24 hour   Intake              800 ml   Output             1390 ml   Net             -590 ml     Admit weight: Weight: 108.9 kg (240 lb)  Current weight: Weight: 114.4 kg (252 lb 3.3 oz) (18 0600)    Labs:  Recent Labs       04/27/18   1430  04/28/18   0510  04/29/18   0417   WBC  28.9*  14.3*  13.9*   RBC  4.67*  4.17*  3.78*   HEMOGLOBIN  14.5  13.0*  11.6*   HEMATOCRIT  41.1*  35.7*  34.2*   MCV  88.0  85.6  90.5   MCH  31.0  31.2  30.7   MCHC  35.3  36.4*  33.9   RDW  39.7  38.2  43.8   PLATELETCT  195  121*  102*   MPV  10.3  10.3  10.4     Recent Labs      04/27/18   1430  04/28/18   0510  04/29/18   0417   NEUTSPOLYS  74.80*  77.00*  79.00*   LYMPHOCYTES  12.20*  8.50*  7.90*   MONOCYTES  13.00  13.90*  12.50   EOSINOPHILS  0.00  0.00  0.10   BASOPHILS  0.00  0.20  0.10   RBCMORPHOLO  Normal   --    --      Recent Labs      04/27/18   1840  04/28/18   0510  04/29/18   0417   SODIUM  142  142  135   POTASSIUM  3.0*  3.7  4.6   CHLORIDE  105  111  102   CO2  14*  25  28   GLUCOSE  249*  90  113*   BUN  16  14  24*   CREATININE  1.41*  0.80  1.08   CALCIUM  7.5*  7.7*  8.3*     Recent Labs      04/26/18   1530  04/26/18   2245  04/27/18   1430   APTT  63.2*  49.6*  32.4   INR  1.08   --   1.27*       Medications:  • amiodarone  400 mg     • furosemide  20 mg     • potassium chloride SA  10 mEq     • morphine ER  15 mg     • docusate sodium  100 mg      And   • senna-docusate  1 Tab     • enoxaparin  40 mg     • mupirocin  1 Application     • magnesium sulfate  1 g Stopped (04/28/18 8747)   • metoprolol  25 mg     • aspirin EC  81 mg     • clopidogrel  75 mg     • MD ALERT... vancomycin       • losartan  50 mg     • rosuvastatin  20 mg         Exam:   Review of Systems   Unable to perform ROS: Intubated       Physical Exam   Constitutional: He appears well-developed and well-nourished.   intubated   HENT:   Head: Normocephalic.   Eyes: Pupils are equal, round, and reactive to light.   Neck: Normal range of motion. No JVD present.   Cardiovascular: Normal rate, regular rhythm and normal heart sounds.    Pulmonary/Chest: Effort normal and breath sounds normal.   Intubated   Abdominal: Soft. Bowel sounds are normal. He exhibits no  distension. There is no guarding.   Genitourinary:   Genitourinary Comments: gill   Musculoskeletal: Normal range of motion. He exhibits edema.   Neurological: He is alert.   Skin: Skin is warm and dry.   prevena to chest, EVH sites CDI   Psychiatric:   SALLY       Quality Measures:   Quality-Core Measures   Gill catheter::  One or Two Days Post Surgery (Day of Surgery being Day 0)  Central line in place:  Need for access  DVT prophylaxis pharmacological::  Contraindicated - High bleeding risk  DVT prophylaxis - mechanical:  SCDs  Ulcer Prophylaxis::  Yes      Assessment/Plan:  POD 1 HDS on low dose epi.  NSR. Vented, ABGs good, weaning to extubated this AM. CT output min, no airlekas.  Will keep CTs/gill today.  Plan to start diuresis once BP stable off epi.  Continuing ABX x 72 hours for bilateral ELIANE harvests.  CPM.  POD 2 HDS< NSR (afib with RVR this AM, converted on amio gtt protocol). Pain issues, shallow breathing.  CT output min, no airleak.  Passing gas, no BM.  PLAN:  Continue amio protocol. Add PO dilaudid.  Stress IS use.  DC mediastinal CTs and gill.  Diurese.     Active Hospital Problems    Diagnosis   • STEMI (ST elevation myocardial infarction) (HCC) [I21.3]     Priority: High   • HTN (hypertension) [I10]         Assessment/Plan:  POD 1 HDS on low dose epi.  NSR. Vented, ABGs good, weaning to extubated this AM. CT output min, no airlekas.  Will keep CTs/gill today.  Plan to start diuresis once BP stable off epi.  Continuing ABX x 72 hours for bilateral ELINAE harvests.  CPM.    Active Hospital Problems    Diagnosis   • STEMI (ST elevation myocardial infarction) (HCC) [I21.3]     Priority: High   • HTN (hypertension) [I10]

## 2018-04-29 NOTE — PROGRESS NOTES
Noted decreased UOP, notified OPAL Vinson about Resp status and outputs. Ok to give one time lasix 20 IVP.

## 2018-04-29 NOTE — PROGRESS NOTES
Pulmonary Critical Care Progress Note      Date of Admission:  4/25/2018    Chief Complaint:  Postoperative ventilator management    Interval Events:  24 hour interval history reviewed     - pain better controlled with increaed meds   - AF/RVR today, amio IV started today   - now rate controlled   - SBP    - Tm 38.8, cx's pending   - lasix    - I/O 846/1615   - extubated 4/28, 4 lpm, PEP/IS   - CTs in place   - vanco x 72 hours per [facility] protocol   - mona OLMEDO         Review of Systems   Constitutional: Negative for fever.   Eyes: Negative for blurred vision and double vision.   Respiratory: Negative for wheezing and stridor.    Cardiovascular: Positive for chest pain (controlled).       Physical Exam   Constitutional: He is oriented to person, place, and time.   Sedated on ventilator   HENT:   Head: Normocephalic and atraumatic.   Nose: Nose normal.   Mouth/Throat: Oropharynx is clear and moist. No oropharyngeal exudate.   Eyes: Conjunctivae and EOM are normal. Pupils are equal, round, and reactive to light. Right eye exhibits no discharge. Left eye exhibits no discharge. No scleral icterus.   Neck: Normal range of motion. Neck supple. No JVD present. No tracheal deviation present.   Cardiovascular: Intact distal pulses.  Exam reveals no gallop.    Intermittent AF   Pulmonary/Chest: No stridor. He has no wheezes. He has rales (bases).   Abdominal: Soft. Bowel sounds are normal. He exhibits no distension. There is no tenderness. There is no rebound and no guarding.   Musculoskeletal: He exhibits no edema, tenderness or deformity.   No clubbing or cyanosis   Neurological: He is alert and oriented to person, place, and time. No cranial nerve deficit.   Skin: Skin is warm and dry. No rash noted. He is not diaphoretic. No erythema. No pallor.   Psychiatric: Affect normal.       PFSH:  No change.    Respiratory:     Pulse Oximetry: 95 %      Recent Labs      04/28/18   0313  04/28/18   1357  04/28/18   2151   ISTATAPH   7.487  7.464  7.387*   ISTATAPCO2  33.4  38.4*  41.6*   ISTATAPO2  78  82  98*   ISTATATCO2  26  29  26   [id number]  97  97  98   ISTATARTHCO3  25.3*  27.6*  25.0   ISTATARTBE  2  4*  0   ISTATTEMP  37.7 C  38.5 C  38.3 C   ISTATFIO2  50  50  60   ISTATSPEC  Arterial  Arterial  Arterial   ISTATAPHTC  7.477  7.441  7.368*   [id number]  82  91*  106*       HemoDynamics:  Pulse: 88, Heart Rate (Monitored): (!) 169  Arterial BP: 120/69, NIBP: 104/75  CVP (mm Hg): (!) 234 MM HG      Fluids:  Intake/Output       04/27/18 0700 - 04/28/18 0659 04/28/18 0700 - 04/29/18 0659 04/29/18 0700 - 04/30/18 0659      0700-1859 1212-0652 Total 0700-1859 2725-8932 Total 2335-6762 5254-2540 Total       Intake    P.O.  --  -- --  --  800 800  --  -- --    P.O. -- -- -- -- 800 800 -- -- --    I.V.  5815.9  2378.8 8194.6  46.5  -- 46.5  --  -- --    Crystalloid Intake 3500 -- 3500 -- -- -- -- -- --    Precedex Volume 49 486.7 535.7 18.9 -- 18.9 -- -- --    Insulin Volume 49.6 381.3 430.9 18 -- 18 -- -- --    Epinephrine Volume 117.3 210.8 328 9.6 -- 9.6 -- -- --    IV Piggyback Volume 1100 1000 2100 -- -- -- -- -- --    Free Fluid 4941 552 9216 -- -- -- -- -- --    Blood  450  -- 450  --  -- --  --  -- --    Cell Saver Volume (mL) 450 -- 450 -- -- -- -- -- --    Total Intake 6265.9 2378.8 8644.6 46.5 800 846.5 -- -- --       Output    Urine  1685  1179 2864  430  1010 1440  --  -- --    Output (mL) (Urinary Catheter Indwelling Catheter) 1685 1179 2864 430 1010 1440 -- -- --    Stool  --  -- --  --  -- --  --  -- --    Number of Times Stooled -- -- -- 0 x 0 x 0 x -- -- --    Chest Tube  173  259 432  20  155 175  --  -- --    Output (mL) (Chest Tube Group 1 (A) Mediastinal round-Kevin 24) 85 120 205 5 50 55 -- -- --    Output (mL) (Chest Tube Group 2 (B) Mediastinal round-Kevin 24) 25 52 77 0 45 45 -- -- --    Output (mL) (Chest Tube Group 3 (C) Mediastinal;Right;Left angled and straight 32) 63 87 150 15 60 75 -- -- --    Blood  920  --  920  --  -- --  --  -- --    Est. Blood Loss (mL) 920 -- 920 -- -- -- -- -- --    Total Output 2778 1438 4216 450 1165 1615 -- -- --       Net I/O     3487.9 940.8 4428.6 -403.5 -365 -768.5 -- -- --        Weight: 114.4 kg (252 lb 3.3 oz)  Recent Labs      18   1430  18   1840  18   0510  18   0417   SODIUM  137  142  142  135   POTASSIUM  3.4*  3.0*  3.7  4.6   CHLORIDE  103  105  111  102   CO2  19*  14*  25  28   BUN  14  16  14  24*   CREATININE  1.04  1.41*  0.80  1.08   MAGNESIUM  2.5   --    --   2.2   CALCIUM  7.7*  7.5*  7.7*  8.3*       GI/Nutrition:    Liver Function  Recent Labs      18   1513  18   1430  18   1840  18   0510  18   041   ALTSGPT  75*  55*   --   47  45   ASTSGOT  162*  107*   --   67*  93*   ALKPHOSPHAT  66  59   --   50  50   TBILIRUBIN  3.8*  4.5*   --   2.9*  3.5*   DBILIRUBIN  0.4   --    --    --    --    GLUCOSE  103*  199*  249*  90  113*       Heme:  Recent Labs      18   1530  18   2245  18   1430  18   0510  18   041   RBC   --    --   4.67*  4.17*  3.78*   HEMOGLOBIN   --    --   14.5  13.0*  11.6*   HEMATOCRIT   --    --   41.1*  35.7*  34.2*   PLATELETCT   --    --   195  121*  102*   PROTHROMBTM  13.7   --   15.6*   --    --    APTT  63.2*  49.6*  32.4   --    --    INR  1.08   --   1.27*   --    --        Infectious Disease:  Monitored Temp 2  Av.3 °C (101 °F)  Min: 37.7 °C (99.9 °F)  Max: 38.8 °C (101.84 °F)  Temp  Av.1 °C (100.6 °F)  Min: 37.8 °C (100 °F)  Max: 38.5 °C (101.3 °F)    Recent Labs      18   1430  18   0510  18   0417   WBC  28.9*  14.3*  13.9*   NEUTSPOLYS  74.80*  77.00*  79.00*   LYMPHOCYTES  12.20*  8.50*  7.90*   MONOCYTES  13.00  13.90*  12.50   EOSINOPHILS  0.00  0.00  0.10   BASOPHILS  0.00  0.20  0.10   ASTSGOT  107*  67*  93*   ALTSGPT  55*  47  45   ALKPHOSPHAT  59  50  50   TBILIRUBIN  4.5*  2.9*  3.5*     Current  Facility-Administered Medications   Medication Dose Frequency Provider Last Rate Last Dose   • amiodarone (CORDARONE) 450 mg in D5W 250 mL Infusion  0.5-1 mg/min Continuous Alisson Escobar, A.P.N. 33 mL/hr at 04/29/18 0633 1 mg/min at 04/29/18 0633   • amiodarone (CORDARONE) tablet 400 mg  400 mg BID Alisson Escobar, A.P.N.       • furosemide (LASIX) injection 20 mg  20 mg BID DIURETIC Alisson Escobar, A.P.N.       • potassium chloride SA (Kdur) tablet 10 mEq  10 mEq BID Alisson Escobar, A.P.N.       • famotidine (PEPCID) tablet 20 mg  20 mg BID Andres Reeves M.D.   20 mg at 04/28/18 1933    Or   • famotidine (PEPCID) injection 20 mg  20 mg BID Andres Reeves M.D.   20 mg at 04/28/18 1226   • morphine ER (MS CONTIN) tablet 15 mg  15 mg Q12HRS Alisson Escobar, A.P.N.   15 mg at 04/29/18 0751   • ipratropium-albuterol (DUONEB) nebulizer solution 3 mL  3 mL Q4H PRN (RT) Andres Reeves M.D.   3 mL at 04/28/18 2141   • Respiratory Care per Protocol   Continuous RT Alisson Escobar, A.P.N.       • NS infusion   Continuous Alisson Escobar, A.P.N. 10 mL/hr at 04/27/18 1500 500 mL at 04/27/18 1500   • Pharmacy Consult Request ...Pain Management Review 1 Each  1 Each PRN Alisson Escobar, A.P.N.       • docusate sodium (COLACE) capsule 100 mg  100 mg QAM Alisson Escobar, A.P.N.   Stopped at 04/28/18 1600    And   • senna-docusate (PERICOLACE or SENOKOT S) 8.6-50 MG per tablet 1 Tab  1 Tab Nightly Alisson Escobar, A.P.N.   1 Tab at 04/28/18 1933    And   • senna-docusate (PERICOLACE or SENOKOT S) 8.6-50 MG per tablet 1 Tab  1 Tab Q24HRS PRN Alisson Escobar, A.P.N.        And   • lactulose 20 GM/30ML solution 30 mL  30 mL Q24HRS PRN Alisson Escobar, A.P.N.        And   • bisacodyl (DULCOLAX) suppository 10 mg  10 mg Q24HRS PRN Alisson Escobar, A.P.N.        And   • fleet enema 133 mL  1 Each Once PRN Alisson Escobar, A.P.N.       • enoxaparin (LOVENOX) inj 40 mg  40 mg DAILY Alisson SARAH  Shawn, A.P.N.   Stopped at 04/28/18 2100   • mupirocin (BACTROBAN) 2 % ointment 1 Application  1 Application BID Alisson Escobar A.P.N.   1 Application at 04/28/18 1933   • Magnesium Sulfate in D5W IVPB premix 1 g  1 g QDAY Alisson Escobar, A.P.N.   Stopped at 04/28/18 1547   • metoprolol (LOPRESSOR) tablet 12.5 mg  12.5 mg BID Alisson Escobar, A.P.N.   12.5 mg at 04/28/18 1933    Followed by   • metoprolol (LOPRESSOR) tablet 25 mg  25 mg BID Alisson Escobar, A.P.N.       • aspirin EC (ECOTRIN) tablet 81 mg  81 mg DAILY Alisson Escobar, A.P.N.   Stopped at 04/28/18 1600   • clopidogrel (PLAVIX) tablet 75 mg  75 mg DAILY Alisson Escobar, A.P.N.   Stopped at 04/28/18 1600   • oxyCODONE immediate-release (ROXICODONE) tablet 5 mg  5 mg Q3HRS PRN Alisson Escobar, A.P.N.       • oxyCODONE immediate release (ROXICODONE) tablet 10 mg  10 mg Q3HRS PRN Alisson Escobar, A.P.N.   10 mg at 04/29/18 0504   • tramadol (ULTRAM) 50 MG tablet 50 mg  50 mg Q4HRS PRRAMAN Escobar, A.P.N.   50 mg at 04/29/18 0751   • ondansetron (ZOFRAN) syringe/vial injection 4 mg  4 mg Q6HRS PRN Alisson Escobar, A.P.N.        Or   • prochlorperazine (COMPAZINE) injection 10 mg  10 mg Q6HRS PRN Alisson Escobar, A.P.N.        Or   • promethazine (PHENERGAN) suppository 25 mg  25 mg Q6HRS PRN Alisson Escobar, A.P.N.       • acetaminophen (TYLENOL) tablet 650 mg  650 mg Q4HRS PRN Alisson Escobar, A.P.N.   650 mg at 04/29/18 0035    Or   • acetaminophen (TYLENOL) suppository 650 mg  650 mg Q4HRS PRN Alisson Escobar, A.P.N.       • mag hydrox-al hydrox-simeth (MAALOX PLUS ES or MYLANTA DS) suspension 30 mL  30 mL Q4HRS PRN Alisson Escobar, A.P.N.       • diphenhydrAMINE (BENADRYL) tablet/capsule 25 mg  25 mg HS PRN - MR X 1 Alisson Escobar, A.P.N.   25 mg at 04/29/18 0035   • dextrose 50% (D50W) injection 25 mL  25 mL PRN Alisson Escobar, A.P.N.       • MD ALERT... vancomycin per pharmacy protocol   pharmacy to dose Donald Pike,  M.D.       • losartan (COZAAR) tablet 50 mg  50 mg DAILY Adelina Vieyra M.D.   Stopped at 04/27/18 0900   • rosuvastatin (CRESTOR) tablet 20 mg  20 mg Q EVENING Leighton Cook M.D.   20 mg at 04/28/18 1933     Last reviewed on 4/28/2018  8:25 PM by Iain An R.NAshli    Quality  Measures:  Labs reviewed, Medications reviewed and Radiology images reviewed                      Assessment and Plan:    Postoperative ventilator management   - extubated 4/28   - IS/PEP, mobilize  Status post 4-vessel coronary bypass grafting - 4/27   - aspirin, Plavix, statin, BB   - [facility] protocols  Status post inferior STEMI   - Status post thrombolytic therapy at outside facility   - Status post emergent angioplasty of subtotally occluded mid circumflex coronary artery   - meds as above  Acute systolic heart failure   - follow I/O closely  History of hypertension   - losartan when clinically appropriate  Hypokalemia   - Replete potassium  AF/RVR   - amiodarone IV  Monitor closely in ICU

## 2018-04-29 NOTE — CARE PLAN
Problem: Post op day 2 CABG/Heart Valve Replacement  Goal: Optimal care of the post op CABG/heart valve replacement post op day 2  Outcome: PROGRESSING AS EXPECTED  Extubated at 1430.   Pain control current issue. Medicated per MAR. Decreasing oxygen needs. IS. Ambulated to chair   Intervention: FSBS: when 2 consecutive BS < 130 after post op day 2, discontinue FSBS unless patient is insulin dependent diabetic  Will monitor 0200 and 0600 FSBS  Intervention: Daily Weights  See flow sheet  Intervention: Up in chair for all meals  Goal for today, pt already up to chair with NICOLE hose on.   Intervention: Ambulate, increasing the distance each time x 3 and before bed  Will attempt  Intervention: Stand at sink and wash up with assistance.  Clean incisions twice daily with soap and water.  Will attempt with day shift.   Intervention: IS q 1 hour while awake and record best IS volume  750  Strong effort  Intervention: Consider pacer wire removal by MD Daniel.   Intervention: Consider removal of gill and chest tube if not already done  Will await midlevel rounding.

## 2018-04-29 NOTE — PROGRESS NOTES
Contacted Debbie VALENTINE regarding respiratory status, orders received. RT at bedside for assessment.

## 2018-04-29 NOTE — PROGRESS NOTES
Late entry note    Pt ambulated around RN station. Monitors called to notify that pt HR sustaining in 180's. Pt brought back to room. Page out to CTS, orders for amio protocol. Completed bolus at 0643, however sustaining HR in 140-160, page out again to CTS, per Alisson JOSEPH, en route but to page cardiology in mean time. Page out to cards at 0650, awaiting Dr. Gomes to return call.

## 2018-04-29 NOTE — PROGRESS NOTES
Pt back in rapid Afib at 170bpm.  APN notified.  Order for additional bolus and to keep drip at 1mg/hour.

## 2018-04-29 NOTE — CARE PLAN
Problem: Post Op Day 1 CABG/Heart Valve Replacement  Goal: Optimal care of the post op CABG/heart valve replacement Post Op Day 1    Intervention: EKG and CXR completed  Done  Intervention: All valve patients: PT/INR daily  Not applicable  Intervention: Antibiotics are discontinued within 24 hours of anesthesia end time unless indication documented for continuation beyond 24 hours  Not applicable  Intervention: Daily Weights  Done on NOC shift  Intervention: Up in chair for all meals  Continued to be intubated  Intervention: Ambulate in am if stable. First ambulation is 25 feet. Repeat x 3 as tolerated.  Ambulate again before bed.  Intubated  Intervention: Discontinue gill catheter unless documented reason for continuation  Intubated  Intervention: Remove original surgical dressing after 24 hrs, leave open to air unless otherwise specified by physician  Done  Intervention: Cardiac rehab phase 1 ordered and smoking cessation education and program referral as appropriate  ordered  Intervention: Consider chest tube removal by MD  Noted  Intervention: IS q 1 hour while awake and record best IS volume  600   Intervention: Graduated elastic stockings  Ordered  Intervention: Saline lock IV  Done

## 2018-04-30 ENCOUNTER — APPOINTMENT (OUTPATIENT)
Dept: RADIOLOGY | Facility: MEDICAL CENTER | Age: 48
DRG: 231 | End: 2018-04-30
Attending: NURSE PRACTITIONER
Payer: COMMERCIAL

## 2018-04-30 LAB
ALBUMIN SERPL BCP-MCNC: 3 G/DL (ref 3.2–4.9)
ALBUMIN/GLOB SERPL: 1.1 G/DL
ALP SERPL-CCNC: 56 U/L (ref 30–99)
ALT SERPL-CCNC: 43 U/L (ref 2–50)
ANION GAP SERPL CALC-SCNC: 4 MMOL/L (ref 0–11.9)
AST SERPL-CCNC: 65 U/L (ref 12–45)
BACTERIA UR CULT: NORMAL
BASOPHILS # BLD AUTO: 0.3 % (ref 0–1.8)
BASOPHILS # BLD: 0.04 K/UL (ref 0–0.12)
BILIRUB SERPL-MCNC: 2.4 MG/DL (ref 0.1–1.5)
BUN SERPL-MCNC: 30 MG/DL (ref 8–22)
CALCIUM SERPL-MCNC: 8.6 MG/DL (ref 8.5–10.5)
CHLORIDE SERPL-SCNC: 98 MMOL/L (ref 96–112)
CO2 SERPL-SCNC: 30 MMOL/L (ref 20–33)
CREAT SERPL-MCNC: 0.96 MG/DL (ref 0.5–1.4)
EOSINOPHIL # BLD AUTO: 0.01 K/UL (ref 0–0.51)
EOSINOPHIL NFR BLD: 0.1 % (ref 0–6.9)
ERYTHROCYTE [DISTWIDTH] IN BLOOD BY AUTOMATED COUNT: 45.4 FL (ref 35.9–50)
GLOBULIN SER CALC-MCNC: 2.8 G/DL (ref 1.9–3.5)
GLUCOSE BLD-MCNC: 83 MG/DL (ref 65–99)
GLUCOSE BLD-MCNC: 84 MG/DL (ref 65–99)
GLUCOSE SERPL-MCNC: 125 MG/DL (ref 65–99)
HCT VFR BLD AUTO: 34.5 % (ref 42–52)
HGB BLD-MCNC: 11.3 G/DL (ref 14–18)
IMM GRANULOCYTES # BLD AUTO: 0.12 K/UL (ref 0–0.11)
IMM GRANULOCYTES NFR BLD AUTO: 0.8 % (ref 0–0.9)
LYMPHOCYTES # BLD AUTO: 0.83 K/UL (ref 1–4.8)
LYMPHOCYTES NFR BLD: 5.3 % (ref 22–41)
MCH RBC QN AUTO: 30.3 PG (ref 27–33)
MCHC RBC AUTO-ENTMCNC: 32.8 G/DL (ref 33.7–35.3)
MCV RBC AUTO: 92.5 FL (ref 81.4–97.8)
MONOCYTES # BLD AUTO: 1.62 K/UL (ref 0–0.85)
MONOCYTES NFR BLD AUTO: 10.3 % (ref 0–13.4)
NEUTROPHILS # BLD AUTO: 13.15 K/UL (ref 1.82–7.42)
NEUTROPHILS NFR BLD: 83.2 % (ref 44–72)
NRBC # BLD AUTO: 0 K/UL
NRBC BLD-RTO: 0 /100 WBC
PLATELET # BLD AUTO: 143 K/UL (ref 164–446)
PMV BLD AUTO: 10.3 FL (ref 9–12.9)
POTASSIUM SERPL-SCNC: 4.3 MMOL/L (ref 3.6–5.5)
PROT SERPL-MCNC: 5.8 G/DL (ref 6–8.2)
RBC # BLD AUTO: 3.73 M/UL (ref 4.7–6.1)
SIGNIFICANT IND 70042: NORMAL
SITE SITE: NORMAL
SODIUM SERPL-SCNC: 132 MMOL/L (ref 135–145)
SOURCE SOURCE: NORMAL
WBC # BLD AUTO: 15.8 K/UL (ref 4.8–10.8)

## 2018-04-30 PROCEDURE — 80053 COMPREHEN METABOLIC PANEL: CPT

## 2018-04-30 PROCEDURE — G8987 SELF CARE CURRENT STATUS: HCPCS | Mod: CI

## 2018-04-30 PROCEDURE — 700102 HCHG RX REV CODE 250 W/ 637 OVERRIDE(OP): Performed by: NURSE PRACTITIONER

## 2018-04-30 PROCEDURE — A9270 NON-COVERED ITEM OR SERVICE: HCPCS | Performed by: HOSPITALIST

## 2018-04-30 PROCEDURE — G8988 SELF CARE GOAL STATUS: HCPCS | Mod: CI

## 2018-04-30 PROCEDURE — A9270 NON-COVERED ITEM OR SERVICE: HCPCS | Performed by: NURSE PRACTITIONER

## 2018-04-30 PROCEDURE — 700111 HCHG RX REV CODE 636 W/ 250 OVERRIDE (IP): Performed by: NURSE PRACTITIONER

## 2018-04-30 PROCEDURE — 71046 X-RAY EXAM CHEST 2 VIEWS: CPT

## 2018-04-30 PROCEDURE — 700105 HCHG RX REV CODE 258: Performed by: NURSE PRACTITIONER

## 2018-04-30 PROCEDURE — 700102 HCHG RX REV CODE 250 W/ 637 OVERRIDE(OP): Performed by: HOSPITALIST

## 2018-04-30 PROCEDURE — 94668 MNPJ CHEST WALL SBSQ: CPT

## 2018-04-30 PROCEDURE — 770020 HCHG ROOM/CARE - TELE (206)

## 2018-04-30 PROCEDURE — 85025 COMPLETE CBC W/AUTO DIFF WBC: CPT

## 2018-04-30 PROCEDURE — G8989 SELF CARE D/C STATUS: HCPCS | Mod: CI

## 2018-04-30 PROCEDURE — 97165 OT EVAL LOW COMPLEX 30 MIN: CPT

## 2018-04-30 PROCEDURE — 700102 HCHG RX REV CODE 250 W/ 637 OVERRIDE(OP): Performed by: INTERNAL MEDICINE

## 2018-04-30 PROCEDURE — 700112 HCHG RX REV CODE 229: Performed by: NURSE PRACTITIONER

## 2018-04-30 PROCEDURE — A9270 NON-COVERED ITEM OR SERVICE: HCPCS | Performed by: INTERNAL MEDICINE

## 2018-04-30 RX ORDER — BACLOFEN 10 MG/1
10 TABLET ORAL ONCE
Status: COMPLETED | OUTPATIENT
Start: 2018-04-30 | End: 2018-04-30

## 2018-04-30 RX ORDER — METOCLOPRAMIDE HYDROCHLORIDE 5 MG/ML
10 INJECTION INTRAMUSCULAR; INTRAVENOUS ONCE
Status: COMPLETED | OUTPATIENT
Start: 2018-04-30 | End: 2018-04-30

## 2018-04-30 RX ORDER — FUROSEMIDE 10 MG/ML
40 INJECTION INTRAMUSCULAR; INTRAVENOUS
Status: DISCONTINUED | OUTPATIENT
Start: 2018-04-30 | End: 2018-05-02

## 2018-04-30 RX ORDER — POTASSIUM CHLORIDE 20 MEQ/1
20 TABLET, EXTENDED RELEASE ORAL 2 TIMES DAILY
Status: DISCONTINUED | OUTPATIENT
Start: 2018-04-30 | End: 2018-05-02

## 2018-04-30 RX ADMIN — AMIODARONE HYDROCHLORIDE 400 MG: 200 TABLET ORAL at 05:21

## 2018-04-30 RX ADMIN — ACETAMINOPHEN 650 MG: 325 TABLET, FILM COATED ORAL at 19:10

## 2018-04-30 RX ADMIN — LOSARTAN POTASSIUM 50 MG: 25 TABLET, FILM COATED ORAL at 08:10

## 2018-04-30 RX ADMIN — ASPIRIN 81 MG: 81 TABLET, COATED ORAL at 10:33

## 2018-04-30 RX ADMIN — CLOPIDOGREL 75 MG: 75 TABLET, FILM COATED ORAL at 10:33

## 2018-04-30 RX ADMIN — POTASSIUM CHLORIDE 20 MEQ: 1500 TABLET, EXTENDED RELEASE ORAL at 20:09

## 2018-04-30 RX ADMIN — TRAMADOL HYDROCHLORIDE 50 MG: 50 TABLET, COATED ORAL at 00:43

## 2018-04-30 RX ADMIN — ENOXAPARIN SODIUM 40 MG: 100 INJECTION SUBCUTANEOUS at 10:33

## 2018-04-30 RX ADMIN — DOCUSATE SODIUM 100 MG: 100 CAPSULE ORAL at 08:10

## 2018-04-30 RX ADMIN — METOPROLOL TARTRATE 25 MG: 25 TABLET, FILM COATED ORAL at 08:10

## 2018-04-30 RX ADMIN — MUPIROCIN 1 APPLICATION: 20 OINTMENT TOPICAL at 08:15

## 2018-04-30 RX ADMIN — METOPROLOL TARTRATE 25 MG: 25 TABLET, FILM COATED ORAL at 21:00

## 2018-04-30 RX ADMIN — TRAMADOL HYDROCHLORIDE 50 MG: 50 TABLET, COATED ORAL at 18:02

## 2018-04-30 RX ADMIN — METOCLOPRAMIDE 10 MG: 5 INJECTION, SOLUTION INTRAMUSCULAR; INTRAVENOUS at 21:35

## 2018-04-30 RX ADMIN — AMIODARONE HYDROCHLORIDE 1 MG/MIN: 50 INJECTION, SOLUTION INTRAVENOUS at 07:58

## 2018-04-30 RX ADMIN — LACTULOSE 30 ML: 20 SOLUTION ORAL at 19:55

## 2018-04-30 RX ADMIN — POTASSIUM CHLORIDE 10 MEQ: 1500 TABLET, EXTENDED RELEASE ORAL at 08:10

## 2018-04-30 RX ADMIN — MORPHINE SULFATE 15 MG: 15 TABLET, EXTENDED RELEASE ORAL at 08:01

## 2018-04-30 RX ADMIN — TRAMADOL HYDROCHLORIDE 50 MG: 50 TABLET, COATED ORAL at 05:21

## 2018-04-30 RX ADMIN — AMIODARONE HYDROCHLORIDE 0.5 MG/MIN: 50 INJECTION, SOLUTION INTRAVENOUS at 21:41

## 2018-04-30 RX ADMIN — STANDARDIZED SENNA CONCENTRATE AND DOCUSATE SODIUM 1 TABLET: 8.6; 5 TABLET, FILM COATED ORAL at 20:09

## 2018-04-30 RX ADMIN — OXYCODONE HYDROCHLORIDE 5 MG: 5 TABLET ORAL at 19:10

## 2018-04-30 RX ADMIN — BACLOFEN 10 MG: 10 TABLET ORAL at 21:35

## 2018-04-30 RX ADMIN — FUROSEMIDE 20 MG: 10 INJECTION, SOLUTION INTRAMUSCULAR; INTRAVENOUS at 05:22

## 2018-04-30 RX ADMIN — ALUMINUM HYDROXIDE, MAGNESIUM HYDROXIDE,SIMETHICONE 30 ML: 400; 400; 40 LIQUID ORAL at 19:25

## 2018-04-30 RX ADMIN — FUROSEMIDE 40 MG: 10 INJECTION, SOLUTION INTRAMUSCULAR; INTRAVENOUS at 16:23

## 2018-04-30 RX ADMIN — ROSUVASTATIN CALCIUM 20 MG: 10 TABLET, FILM COATED ORAL at 20:09

## 2018-04-30 RX ADMIN — STANDARDIZED SENNA CONCENTRATE AND DOCUSATE SODIUM 1 TABLET: 8.6; 5 TABLET, FILM COATED ORAL at 10:32

## 2018-04-30 RX ADMIN — AMIODARONE HYDROCHLORIDE 400 MG: 200 TABLET ORAL at 18:02

## 2018-04-30 RX ADMIN — TRAMADOL HYDROCHLORIDE 50 MG: 50 TABLET, COATED ORAL at 10:33

## 2018-04-30 RX ADMIN — MUPIROCIN 1 APPLICATION: 20 OINTMENT TOPICAL at 21:00

## 2018-04-30 ASSESSMENT — ENCOUNTER SYMPTOMS
VOMITING: 0
FEVER: 0
PALPITATIONS: 0
INSOMNIA: 0
HEADACHES: 0
SENSORY CHANGE: 0
BACK PAIN: 0
TREMORS: 0
WEAKNESS: 0
CHILLS: 0
CONSTITUTIONAL NEGATIVE: 1
NERVOUS/ANXIOUS: 0
GASTROINTESTINAL NEGATIVE: 1
PSYCHIATRIC NEGATIVE: 1
NEUROLOGICAL NEGATIVE: 1
ABDOMINAL PAIN: 0
ORTHOPNEA: 0
SPUTUM PRODUCTION: 0
NAUSEA: 0
EYES NEGATIVE: 1
SINUS PAIN: 0
MUSCULOSKELETAL NEGATIVE: 1
TINGLING: 0
EYE PAIN: 0
WHEEZING: 0
SPEECH CHANGE: 0
SHORTNESS OF BREATH: 0
DIARRHEA: 0
SORE THROAT: 0
BLURRED VISION: 0
NECK PAIN: 0
STRIDOR: 0
COUGH: 0
DOUBLE VISION: 0
DEPRESSION: 0
RESPIRATORY NEGATIVE: 1

## 2018-04-30 ASSESSMENT — COGNITIVE AND FUNCTIONAL STATUS - GENERAL
DRESSING REGULAR LOWER BODY CLOTHING: A LOT
DAILY ACTIVITIY SCORE: 22
SUGGESTED CMS G CODE MODIFIER DAILY ACTIVITY: CJ

## 2018-04-30 ASSESSMENT — PAIN SCALES - GENERAL
PAINLEVEL_OUTOF10: 3
PAINLEVEL_OUTOF10: 4
PAINLEVEL_OUTOF10: 2
PAINLEVEL_OUTOF10: 3
PAINLEVEL_OUTOF10: 2
PAINLEVEL_OUTOF10: 2
PAINLEVEL_OUTOF10: 3
PAINLEVEL_OUTOF10: 3
PAINLEVEL_OUTOF10: 2
PAINLEVEL_OUTOF10: 4
PAINLEVEL_OUTOF10: 3
PAINLEVEL_OUTOF10: 4
PAINLEVEL_OUTOF10: 0
PAINLEVEL_OUTOF10: 0

## 2018-04-30 ASSESSMENT — ACTIVITIES OF DAILY LIVING (ADL): TOILETING: INDEPENDENT

## 2018-04-30 NOTE — PROGRESS NOTES
12 hour chart check and monitor summary    Sinus Rhythm w/ an occurrence of atrial fibrillation and rare PVCs; 70s-80s    .16/.10/.40

## 2018-04-30 NOTE — DIETARY
Nutrition Update:  Patient with poor PO intake at this time; needs supplements order.    RD noted 0% of lunch tray consumed at bedside.  He is currently unavailable for dietary interview d/t pt taking shower, family states at bedside.  Pt to benefit from Boost Glucose supplements BID = 500 kcal, 28 grams protein.  Started Care Plan for adequate PO intake; also discussed with RN.     Evaluation:  1. Blood glucose 125   2. Pertinent meds: Colace, senna-docusate, Lasix BID, K-Dur.   3. Current diet order: Consistent CHO/Cardiac  4. Last BM PTA    Plan/recommend: please add order for supplements - Boost Glucose Control in between meals.  Discussed with RN.

## 2018-04-30 NOTE — DISCHARGE PLANNING
Medical SW    Sw attended AM IDT Rounds.    Per face sheet, pt is resident of [facility], , 47yo male, admitted 4/25 for CABG x4, and carries [place].    RN reports, pt PO day 3, A/O, no gill, ambulating unit,     Plan: Sw to assist w/ d/c planning as needed.

## 2018-04-30 NOTE — CARE PLAN
Problem: Post op day 2 CABG/Heart Valve Replacement  Goal: Optimal care of the post op CABG/heart valve replacement post op day 2    Intervention: FSBS: when 2 consecutive BS < 130 after post op day 2, discontinue FSBS unless patient is insulin dependent diabetic  Discontinued.  Intervention: Daily Weights  Done.   Intervention: Up in chair for all meals  Done.   Intervention: Ambulate, increasing the distance each time x 3 and before bed  Done - ambulated 120 feet before bed.   Intervention: Stand at sink and wash up with assistance.  Clean incisions twice daily with soap and water.  Completed.   Intervention: IS q 1 hour while awake and record best IS volume  Completed - 750   Intervention: Consider pacer wire removal by MD  Pacer wires are capped.   Intervention: Consider removal of gill and chest tube if not already done  Gill is removed.

## 2018-04-30 NOTE — PROGRESS NOTES
Received report from days that patient had two clogged ports on central line. The blue and white port were both successfully unclogged using alteplase.

## 2018-04-30 NOTE — THERAPY
"Occupational Therapy Evaluation completed.   Functional Status:  OT eval completed on 49 YO M s/p CABGx4. Pt with discomfort from hiccups and nausea this am. Pt's mother present for tx session. Pt demonstrated functional transfers with SBA and ADLs with SPV. Pt max A for LB dressing (socks) however family has sock aide pt can use. Pt and family do not have concerns regarding BADLs upon d/c however mother expressed concerns about pt on airplane and tram while returning home to [place]. Pt does not require continued acute OT services at this time. Pt agreeable to continue mobilization with RN.  Plan of Care: Patient with no further skilled OT needs in the acute care setting at this time  Discharge Recommendations:  Post-acute therapy Discharge to home with outpatient or home health    See \"Rehab Therapy-Acute\" Patient Summary Report for complete documentation.    "

## 2018-04-30 NOTE — PROGRESS NOTES
Patient in Afib up to 170bpm when ambulating to the bathroom and in the [place]on. Converted back to sinus rhythm once sitting in the chair again.

## 2018-04-30 NOTE — CARE PLAN
Problem: Post op day 2 CABG/Heart Valve Replacement  Goal: Optimal care of the post op CABG/heart valve replacement post op day 2    Intervention: FSBS: when 2 consecutive BS < 130 after post op day 2, discontinue FSBS unless patient is insulin dependent diabetic  Discontinued  Intervention: Daily Weights  Done on NOC shift  Intervention: Up in chair for all meals  yes  Intervention: Ambulate, increasing the distance each time x 3 and before bed  Pt ambulated to bathroom, but had to address pain issues before walking any distance.  Intervention: Stand at sink and wash up with assistance.  Clean incisions twice daily with soap and water.  yes  Intervention: IS q 1 hour while awake and record best IS volume  yes  Intervention: Consider pacer wire removal by MD  capped  Intervention: Consider removal of gill and chest tube if not already done  MS discontinued and gill discontinued.

## 2018-04-30 NOTE — PROGRESS NOTES
Pulmonary Critical Care Progress Note      Date of Admission:  4/25/2018  Date of Service:  4/30/2018  Chief Complaint:  Postoperative ventilator management for 4 V CABG    Interval Events:  24 hour interval history reviewed   - POD#3 for 4V CABG   - SR/a-fib 70-120s   - -120s   - amio at 0.5   - last BM pta   - uop of 700cc with urinal   - tolerating regular diet   - IS at 1000cc   - CXR(reviewed):   - lovenox   - chest tubes still in place    Yesterday's Events:   - pain better controlled with increaed meds   - AF/RVR today, amio IV started today   - now rate controlled   - SBP    - Tm 38.8, cx's pending   - lasix    - I/O 846/1615   - extubated 4/28, 4 lpm, PEP/IS   - CTs in place   - vanco x 72 hours per [facility]S protocol   - mona OLMEDO         Review of Systems   Constitutional: Negative for chills, fever and malaise/fatigue.   HENT: Negative for ear pain, hearing loss, sinus pain and sore throat.    Eyes: Negative for blurred vision, double vision and pain.   Respiratory: Negative for cough, sputum production, shortness of breath, wheezing and stridor.    Cardiovascular: Positive for chest pain. Negative for palpitations, orthopnea and leg swelling.   Gastrointestinal: Negative for abdominal pain, diarrhea, nausea and vomiting.   Genitourinary: Negative for dysuria, frequency and urgency.   Musculoskeletal: Negative for back pain and neck pain.   Skin: Negative.    Neurological: Negative for tingling, tremors, sensory change, speech change, weakness and headaches.   Endo/Heme/Allergies: Negative.    Psychiatric/Behavioral: Negative for depression. The patient is not nervous/anxious and does not have insomnia.        Physical Exam   Constitutional: He is oriented to person, place, and time and well-developed, well-nourished, and in no distress. No distress.   HENT:   Right Ear: External ear normal.   Left Ear: External ear normal.   Nose: Nose normal.   Mouth/Throat: Oropharynx is clear and moist. No  oropharyngeal exudate.   Eyes: Conjunctivae and EOM are normal. Pupils are equal, round, and reactive to light. No scleral icterus.   Neck: Normal range of motion. Neck supple. No thyromegaly present.   Cardiovascular: Normal heart sounds and intact distal pulses.    No murmur heard.  Irregularly irregular     Pulmonary/Chest: Effort normal and breath sounds normal. No stridor. No respiratory distress. He exhibits no tenderness.   Abdominal: Soft. Bowel sounds are normal. He exhibits no distension and no mass. There is no tenderness.   Musculoskeletal: Normal range of motion. He exhibits no edema or tenderness.   Lymphadenopathy:     He has no cervical adenopathy.   Neurological: He is alert and oriented to person, place, and time. He has normal reflexes. Gait normal. Coordination normal.   Skin: Skin is warm and dry. No rash noted. He is not diaphoretic.   Psychiatric: Mood, memory, affect and judgment normal.   Nursing note and vitals reviewed.      PFSH:  No change.    Respiratory:     Pulse Oximetry: 95 %      Recent Labs      04/28/18   0313  04/28/18   1357  04/28/18   2151   ISTATAPH  7.487  7.464  7.387*   ISTATAPCO2  33.4  38.4*  41.6*   ISTATAPO2  78  82  98*   ISTATATCO2  26  29  26   DALAEJT0ZXQ  97  97  98   ISTATARTHCO3  25.3*  27.6*  25.0   ISTATARTBE  2  4*  0   ISTATTEMP  37.7 C  38.5 C  38.3 C   ISTATFIO2  50  50  60   ISTATSPEC  Arterial  Arterial  Arterial   ISTATAPHTC  7.477  7.441  7.368*   [id number]  82  91*  106*       HemoDynamics:  Pulse: 76, Heart Rate (Monitored): 76  NIBP: 111/76        Fluids:  Intake/Output       04/28/18 0700 - 04/29/18 0659 04/29/18 0700 - 04/30/18 0659 04/30/18 0700 - 05/01/18 0659      0535-3427 1443-7703 Total 7060-6627 9715-6151 Total 6060-1270 8374-8426 Total       Intake    P.O.  --  800 800  --  100 100  --  -- --    P.O. -- 800 800 -- 100 100 -- -- --    I.V.  46.5  -- 46.5  316  266.4 582.4  --  -- --    Precedex Volume 18.9 -- 18.9 -- -- -- -- -- --     Amiodarone Volume -- -- -- 316 266.4 582.4 -- -- --    Insulin Volume 18 -- 18 -- -- -- -- -- --    Epinephrine Volume 9.6 -- 9.6 -- -- -- -- -- --    Total Intake 46.5 800 846.5 316 366.4 682.4 -- -- --       Output    Urine  430  1010 1440  1275  -- 1275  --  -- --    Number of Times Voided -- -- -- -- 1 x 1 x -- -- --    Output (mL) ([REMOVED] Urinary Catheter Indwelling Catheter) 430 1010 1440 1275 -- 1275 -- -- --    Stool  --  -- --  --  -- --  --  -- --    Number of Times Stooled 0 x 0 x 0 x 0 x 0 x 0 x -- -- --    Chest Tube  20  155 175  118  -- 118  --  -- --    Output (mL) (Chest Tube Group 1 (A) Mediastinal round-Kevin 24) 5 50 55 40 -- 40 -- -- --    Output (mL) (Chest Tube Group 2 (B) Mediastinal round-Kevin 24) 0 45 45 78 -- 78 -- -- --    Output (mL) ([REMOVED] Chest Tube Group 3 (C) Mediastinal;Right;Left angled and straight 32) 15 60 75 -- -- -- -- -- --    Total Output 450 1165 1615 1393 -- 1393 -- -- --       Net I/O     -403.5 -365 -768.5 -1077 366.4 -710.6 -- -- --        Weight: 114.4 kg (252 lb 3.3 oz)  Recent Labs      04/27/18   1430  04/27/18   1840  04/28/18   0510  04/29/18   0417   SODIUM  137  142  142  135   POTASSIUM  3.4*  3.0*  3.7  4.6   CHLORIDE  103  105  111  102   CO2  19*  14*  25  28   BUN  14  16  14  24*   CREATININE  1.04  1.41*  0.80  1.08   MAGNESIUM  2.5   --    --   2.2   CALCIUM  7.7*  7.5*  7.7*  8.3*       GI/Nutrition:    Liver Function  Recent Labs      04/27/18   1430  04/27/18   1840  04/28/18   0510  04/29/18 0417   ALTSGPT  55*   --   47  45   ASTSGOT  107*   --   67*  93*   ALKPHOSPHAT  59   --   50  50   TBILIRUBIN  4.5*   --   2.9*  3.5*   GLUCOSE  199*  249*  90  113*       Heme:  Recent Labs      04/27/18   1430  04/28/18   0510  04/29/18   0417  04/30/18 0518   RBC  4.67*  4.17*  3.78*  3.73*   HEMOGLOBIN  14.5  13.0*  11.6*  11.3*   HEMATOCRIT  41.1*  35.7*  34.2*  34.5*   PLATELETCT  195  121*  102*  143*   PROTHROMBTM  15.6*   --    --    --     APTT  32.4   --    --    --    INR  1.27*   --    --    --        Infectious Disease:  Monitored Temp 2  Av.5 °C (99.5 °F)  Min: 37.3 °C (99.14 °F)  Max: 37.8 °C (100 °F)  Temp  Av.6 °C (97.9 °F)  Min: 36.3 °C (97.3 °F)  Max: 37.2 °C (99 °F)    Recent Labs      18   1430  18   0510  18   0417  1818   WBC  28.9*  14.3*  13.9*  15.8*   NEUTSPOLYS  74.80*  77.00*  79.00*  83.20*   LYMPHOCYTES  12.20*  8.50*  7.90*  5.30*   MONOCYTES  13.00  13.90*  12.50  10.30   EOSINOPHILS  0.00  0.00  0.10  0.10   BASOPHILS  0.00  0.20  0.10  0.30   ASTSGOT  107*  67*  93*   --    ALTSGPT  55*  47  45   --    ALKPHOSPHAT  59  50  50   --    TBILIRUBIN  4.5*  2.9*  3.5*   --      Current Facility-Administered Medications   Medication Dose Frequency Provider Last Rate Last Dose   • amiodarone (CORDARONE) 450 mg in D5W 250 mL Infusion  1 mg/min Continuous Alisson Escobar, A.P.N. 33 mL/hr at 18 2331 1 mg/min at 18 2331   • amiodarone (CORDARONE) tablet 400 mg  400 mg BID Alisson Escobar, A.P.N.   400 mg at 18 0521   • furosemide (LASIX) injection 20 mg  20 mg BID DIURETIC Alisson Montanat, A.P.N.   20 mg at 18   • potassium chloride SA (Kdur) tablet 10 mEq  10 mEq BID Alisson Escobar, A.P.N.   10 mEq at 183   • HYDROmorphone (DILAUDID) tablet 2 mg  2 mg Q3HRS PRN Alisson Escobar, A.P.N.   2 mg at 18 1534   • morphine ER (MS CONTIN) tablet 15 mg  15 mg Q12HRS Alisson Escobar, A.P.N.   15 mg at 18 2106   • ipratropium-albuterol (DUONEB) nebulizer solution 3 mL  3 mL Q4H PRN (RT) Andres Reeves M.D.   3 mL at 18 2141   • Respiratory Care per Protocol   Continuous RT Alisson Escobar, A.P.N.       • NS infusion   Continuous Alisson Escobar, A.P.N. 10 mL/hr at 18 1500 500 mL at 18 1500   • Pharmacy Consult Request ...Pain Management Review 1 Each  1 Each PRN Alisson Escobar, A.P.N.       • docusate sodium (COLACE)  capsule 100 mg  100 mg QAM Alisson Escobar, A.P.N.   100 mg at 04/29/18 0851    And   • senna-docusate (PERICOLACE or SENOKOT S) 8.6-50 MG per tablet 1 Tab  1 Tab Nightly Alisson Escobar, A.P.N.   1 Tab at 04/29/18 1934    And   • senna-docusate (PERICOLACE or SENOKOT S) 8.6-50 MG per tablet 1 Tab  1 Tab Q24HRS PRN Alisson Escobar, A.P.N.        And   • lactulose 20 GM/30ML solution 30 mL  30 mL Q24HRS PRN Alisson Escobar, A.P.N.        And   • bisacodyl (DULCOLAX) suppository 10 mg  10 mg Q24HRS PRN Alisson Escobar, A.P.N.        And   • fleet enema 133 mL  1 Each Once PRN Alisson Escobar, A.P.N.       • enoxaparin (LOVENOX) inj 40 mg  40 mg DAILY Alisson Escobar, A.P.N.   Stopped at 04/28/18 2100   • mupirocin (BACTROBAN) 2 % ointment 1 Application  1 Application BID Alisson Escobar, A.P.N.   1 Application at 04/29/18 1934   • metoprolol (LOPRESSOR) tablet 25 mg  25 mg BID Alisson Escobar, A.P.N.   25 mg at 04/29/18 1935   • aspirin EC (ECOTRIN) tablet 81 mg  81 mg DAILY Alisson Escobar, A.P.N.   81 mg at 04/29/18 0851   • clopidogrel (PLAVIX) tablet 75 mg  75 mg DAILY Alisson Escobar, A.P.N.   75 mg at 04/29/18 0851   • oxyCODONE immediate-release (ROXICODONE) tablet 5 mg  5 mg Q3HRS PRN Alisson Escobar, A.P.N.       • oxyCODONE immediate release (ROXICODONE) tablet 10 mg  10 mg Q3HRS PRN Alisson Escobar, A.P.N.   10 mg at 04/29/18 1741   • tramadol (ULTRAM) 50 MG tablet 50 mg  50 mg Q4HRS PRN Alisson Escobar, A.P.N.   50 mg at 04/30/18 0521   • ondansetron (ZOFRAN) syringe/vial injection 4 mg  4 mg Q6HRS PRN Alisson Escobar, A.P.N.        Or   • prochlorperazine (COMPAZINE) injection 10 mg  10 mg Q6HRS PRN Alisson Escobar, A.P.N.        Or   • promethazine (PHENERGAN) suppository 25 mg  25 mg Q6HRS PRN Alisson Escobar, A.P.N.       • acetaminophen (TYLENOL) tablet 650 mg  650 mg Q4HRS PRN Alisson Escobar, A.P.N.   650 mg at 04/29/18 0850    Or   • acetaminophen (TYLENOL) suppository 650 mg  650  mg Q4HRS PRN Alisson Escobar, A.P.N.       • mag hydrox-al hydrox-simeth (MAALOX PLUS ES or MYLANTA DS) suspension 30 mL  30 mL Q4HRS PRN Alisson Escobar, A.P.N.       • diphenhydrAMINE (BENADRYL) tablet/capsule 25 mg  25 mg HS PRN - MR X 1 Alisson Escobar, A.P.N.   25 mg at 04/29/18 0035   • dextrose 50% (D50W) injection 25 mL  25 mL PRN Alisson Escobar, A.P.N.       • MD ALERT... vancomycin per pharmacy protocol   pharmacy to dose Donald Pike M.D.       • losartan (COZAAR) tablet 50 mg  50 mg DAILY Adelina Vieyra M.D.   50 mg at 04/29/18 1145   • rosuvastatin (CRESTOR) tablet 20 mg  20 mg Q EVENING Leighton Cook M.D.   20 mg at 04/29/18 1933     Last reviewed on 4/28/2018  8:25 PM by Iain An R.N.    Quality  Measures:  Labs reviewed, Medications reviewed and Radiology images reviewed  Bravo catheter: No Bravo  Central line in place: Need for access    DVT Prophylaxis: Enoxaparin (Lovenox)  DVT prophylaxis - mechanical: SCDs  Ulcer prophylaxis: Not indicated          Assessment and Plan:    Postoperative ventilator management   - extubated 4/28   - IS/PEP, mobilize  Status post 4-vessel coronary bypass grafting - 4/27   - aspirin, Plavix, statin, BB   - [facility]S protocols  Status post inferior STEMI   - Status post thrombolytic therapy at outside facility   - Status post emergent angioplasty of subtotally occluded mid circumflex coronary artery   - meds as above  Acute systolic heart failure   - follow I/O closely  History of hypertension   - losartan when clinically appropriate  Hypokalemia   - Replete potassium  AF/RVR   - restarted amiodarone infusion   - started oral amiodarone today    Will continue to follow in the ICU  [id number]

## 2018-04-30 NOTE — PROGRESS NOTES
Cardiovascular Surgery Progress Note    Name: Jae Venegas  MRN: [id number]  : 1970  Admit Date: 2018 10:33 PM  Procedure:  Procedure(s) and Anesthesia Type:     * MULTIPLE CORONARY ARTERY BYPASS ENDO VEIN HARVEST - X4, BILATERAL INTERNAL MAMMARY ARTERY HARVEST - General     * [facility] - General  3 Day Post-Op    Vitals:  Patient Vitals for the past 8 hrs:   Temp SpO2 O2 Delivery O2 (LPM) Pulse Heart Rate (Monitored) Resp NIBP Weight   18 0808 - 93 % - 1 75 73 18 - -   18 0800 36.1 °C (97 °F) 95 % Silicone Nasal Cannula 2 75 73 17 122/75 -   18 0600 - 95 % Silicone Nasal Cannula 2 75 75 16 128/74 -   18 0500 - - - - - - - - 119.8 kg (264 lb 1.8 oz)   18 0400 36.5 °C (97.7 °F) 95 % - - 76 76 14 111/76 -     Temp (24hrs), Av.4 °C (97.5 °F), Min:36.1 °C (97 °F), Max:36.5 °C (97.7 °F)      Respiratory:    Respiration: 18, Pulse Oximetry: 93 %, O2 Daily Delivery Respiratory : Silicone Nasal Cannula  Chest Tube Group 1 (A) Mediastinal round-Kevin 24-Tube Status / Drainage: Draining;Patent;Sutured in Place;Serosanguinous, Chest Tube Group 2 (B) Mediastinal round-Kevin 24-Tube Status / Drainage: Draining;Patent, Chest Tube Group 1 (A) Mediastinal round-Kevin 24-Device: Suction 20 cm Water;Dry Closed Drainage System, Chest Tube Group 2 (B) Mediastinal round-Kevin 24-Device: Suction 20 cm Water;Dry Closed Drainage System  Chest Tube Drains:          Fluids:    Intake/Output Summary (Last 24 hours) at 18 1019  Last data filed at 18 0800   Gross per 24 hour   Intake           1115.6 ml   Output             1888 ml   Net           -772.4 ml     Admit weight: Weight: 108.9 kg (240 lb)  Current weight: Weight: 119.8 kg (264 lb 1.8 oz) (18 0500)    Labs:  Recent Labs      18   0510  18   0417  18   0518   WBC  14.3*  13.9*  15.8*   RBC  4.17*  3.78*  3.73*   HEMOGLOBIN  13.0*  11.6*  11.3*   HEMATOCRIT  35.7*  34.2*  34.5*   MCV  85.6  90.5  92.5   MCH  31.2   30.7  30.3   MCHC  36.4*  33.9  32.8*   RDW  38.2  43.8  45.4   PLATELETCT  121*  102*  143*   MPV  10.3  10.4  10.3     Recent Labs      04/27/18   1430  04/28/18   0510  04/29/18 0417  04/30/18   0518   NEUTSPOLYS  74.80*  77.00*  79.00*  83.20*   LYMPHOCYTES  12.20*  8.50*  7.90*  5.30*   MONOCYTES  13.00  13.90*  12.50  10.30   EOSINOPHILS  0.00  0.00  0.10  0.10   BASOPHILS  0.00  0.20  0.10  0.30   RBCMORPHOLO  Normal   --    --    --      Recent Labs      04/28/18   0510  04/29/18 0417 04/30/18   0518   SODIUM  142  135  132*   POTASSIUM  3.7  4.6  4.3   CHLORIDE  111  102  98   CO2  25  28  30   GLUCOSE  90  113*  125*   BUN  14  24*  30*   CREATININE  0.80  1.08  0.96   CALCIUM  7.7*  8.3*  8.6     Recent Labs      04/27/18   1430   APTT  32.4   INR  1.27*       Medications:  • furosemide  40 mg     • potassium chloride SA  20 mEq     • amiodarone  400 mg     • docusate sodium  100 mg      And   • senna-docusate  1 Tab     • enoxaparin  40 mg     • mupirocin  1 Application     • metoprolol  25 mg     • aspirin EC  81 mg     • clopidogrel  75 mg     • MD ALERT... vancomycin       • losartan  50 mg     • rosuvastatin  20 mg         Exam:   Review of Systems   Constitutional: Negative.    HENT: Negative.    Eyes: Negative.    Respiratory: Negative.    Cardiovascular: Chest pain: MSK.   Gastrointestinal: Negative.    Genitourinary: Negative.    Musculoskeletal: Negative.    Skin: Negative.    Neurological: Negative.    Endo/Heme/Allergies: Negative.    Psychiatric/Behavioral: Negative.        Physical Exam   Constitutional: He is oriented to person, place, and time. He appears well-developed and well-nourished.   HENT:   Head: Normocephalic.   Eyes: Pupils are equal, round, and reactive to light.   Neck: Normal range of motion. No JVD present.   Cardiovascular: Normal rate, regular rhythm and normal heart sounds.    Pulmonary/Chest: Effort normal and breath sounds normal.   Dim bases, shallow breaths    Abdominal: Soft. Bowel sounds are normal. He exhibits no distension. There is no guarding.   Musculoskeletal: Normal range of motion. He exhibits edema.   Neurological: He is alert and oriented to person, place, and time.   Skin: Skin is warm and dry.   prevena to chest, EVH sites CDI   Psychiatric: He has a normal mood and affect. His behavior is normal. Judgment and thought content normal.       Quality Measures:   Quality-Core Measures   Gill catheter::  No Gill  Central line in place:  Need for access  DVT prophylaxis pharmacological::  Contraindicated - High bleeding risk  DVT prophylaxis - mechanical:  SCDs  Ulcer Prophylaxis::  Yes  Assessed for rehabilitation services:  Patient returned to prior level of function, rehabilitation not indicated at this time    Assessment/Plan:  POD 1 HDS on low dose epi.  NSR. Vented, ABGs good, weaning to extubated this AM. CT output min, no airlekas.  Will keep CTs/gill today.  Plan to start diuresis once BP stable off epi.  Continuing ABX x 72 hours for bilateral ELIANE harvests.  CPM.  POD 2 HDS< NSR (afib with RVR this AM, converted on amio gtt protocol). Pain issues, shallow breathing.  CT output min, no airleak.  Passing gas, no BM.  PLAN:  Continue amio protocol. Add PO dilaudid.  Stress IS use.  DC mediastinal CTs and gill.  Diurese.   POD 3 HDS, Afib for 40 minutes this am- now SR- on amio gtt, CT bilat pleural tubes- 50/70 cc, sedate- dc ms contin, inc diuresis, amb, enc IS    Active Hospital Problems    Diagnosis   • STEMI (ST elevation myocardial infarction) (HCC) [I21.3]     Priority: High   • HTN (hypertension) [I10]

## 2018-05-01 ENCOUNTER — APPOINTMENT (OUTPATIENT)
Dept: RADIOLOGY | Facility: MEDICAL CENTER | Age: 48
DRG: 231 | End: 2018-05-01
Attending: NURSE PRACTITIONER
Payer: COMMERCIAL

## 2018-05-01 LAB
ALBUMIN SERPL BCP-MCNC: 3.3 G/DL (ref 3.2–4.9)
ALBUMIN/GLOB SERPL: 1.1 G/DL
ALP SERPL-CCNC: 85 U/L (ref 30–99)
ALT SERPL-CCNC: 47 U/L (ref 2–50)
ANION GAP SERPL CALC-SCNC: 8 MMOL/L (ref 0–11.9)
AST SERPL-CCNC: 49 U/L (ref 12–45)
BASOPHILS # BLD AUTO: 0.4 % (ref 0–1.8)
BASOPHILS # BLD: 0.06 K/UL (ref 0–0.12)
BILIRUB SERPL-MCNC: 2.2 MG/DL (ref 0.1–1.5)
BUN SERPL-MCNC: 33 MG/DL (ref 8–22)
CALCIUM SERPL-MCNC: 8.7 MG/DL (ref 8.5–10.5)
CHLORIDE SERPL-SCNC: 93 MMOL/L (ref 96–112)
CO2 SERPL-SCNC: 31 MMOL/L (ref 20–33)
CREAT SERPL-MCNC: 1.05 MG/DL (ref 0.5–1.4)
EOSINOPHIL # BLD AUTO: 0.03 K/UL (ref 0–0.51)
EOSINOPHIL NFR BLD: 0.2 % (ref 0–6.9)
ERYTHROCYTE [DISTWIDTH] IN BLOOD BY AUTOMATED COUNT: 43 FL (ref 35.9–50)
GLOBULIN SER CALC-MCNC: 3 G/DL (ref 1.9–3.5)
GLUCOSE SERPL-MCNC: 117 MG/DL (ref 65–99)
HCT VFR BLD AUTO: 36.1 % (ref 42–52)
HGB BLD-MCNC: 12.2 G/DL (ref 14–18)
IMM GRANULOCYTES # BLD AUTO: 0.1 K/UL (ref 0–0.11)
IMM GRANULOCYTES NFR BLD AUTO: 0.6 % (ref 0–0.9)
LACTATE BLD-SCNC: 1.3 MMOL/L (ref 0.5–2)
LYMPHOCYTES # BLD AUTO: 1.08 K/UL (ref 1–4.8)
LYMPHOCYTES NFR BLD: 6.7 % (ref 22–41)
MCH RBC QN AUTO: 30.6 PG (ref 27–33)
MCHC RBC AUTO-ENTMCNC: 33.8 G/DL (ref 33.7–35.3)
MCV RBC AUTO: 90.5 FL (ref 81.4–97.8)
MONOCYTES # BLD AUTO: 2.07 K/UL (ref 0–0.85)
MONOCYTES NFR BLD AUTO: 12.8 % (ref 0–13.4)
NEUTROPHILS # BLD AUTO: 12.78 K/UL (ref 1.82–7.42)
NEUTROPHILS NFR BLD: 79.3 % (ref 44–72)
NRBC # BLD AUTO: 0 K/UL
NRBC BLD-RTO: 0 /100 WBC
PLATELET # BLD AUTO: 202 K/UL (ref 164–446)
PMV BLD AUTO: 10.3 FL (ref 9–12.9)
POTASSIUM SERPL-SCNC: 3.9 MMOL/L (ref 3.6–5.5)
PROT SERPL-MCNC: 6.3 G/DL (ref 6–8.2)
RBC # BLD AUTO: 3.99 M/UL (ref 4.7–6.1)
SODIUM SERPL-SCNC: 132 MMOL/L (ref 135–145)
WBC # BLD AUTO: 16.1 K/UL (ref 4.8–10.8)

## 2018-05-01 PROCEDURE — 770020 HCHG ROOM/CARE - TELE (206)

## 2018-05-01 PROCEDURE — 306310 ANTI-EMBOLISM STOCKINGS XXLRG REG: Performed by: HOSPITALIST

## 2018-05-01 PROCEDURE — A9270 NON-COVERED ITEM OR SERVICE: HCPCS | Performed by: NURSE PRACTITIONER

## 2018-05-01 PROCEDURE — 700102 HCHG RX REV CODE 250 W/ 637 OVERRIDE(OP): Performed by: NURSE PRACTITIONER

## 2018-05-01 PROCEDURE — 700102 HCHG RX REV CODE 250 W/ 637 OVERRIDE(OP): Performed by: HOSPITALIST

## 2018-05-01 PROCEDURE — 700111 HCHG RX REV CODE 636 W/ 250 OVERRIDE (IP): Performed by: NURSE PRACTITIONER

## 2018-05-01 PROCEDURE — 700105 HCHG RX REV CODE 258: Performed by: NURSE PRACTITIONER

## 2018-05-01 PROCEDURE — 700102 HCHG RX REV CODE 250 W/ 637 OVERRIDE(OP): Performed by: INTERNAL MEDICINE

## 2018-05-01 PROCEDURE — A9270 NON-COVERED ITEM OR SERVICE: HCPCS | Performed by: INTERNAL MEDICINE

## 2018-05-01 PROCEDURE — A9270 NON-COVERED ITEM OR SERVICE: HCPCS | Performed by: HOSPITALIST

## 2018-05-01 PROCEDURE — 94668 MNPJ CHEST WALL SBSQ: CPT

## 2018-05-01 PROCEDURE — 74018 RADEX ABDOMEN 1 VIEW: CPT

## 2018-05-01 PROCEDURE — G8979 MOBILITY GOAL STATUS: HCPCS | Mod: CI

## 2018-05-01 PROCEDURE — 97162 PT EVAL MOD COMPLEX 30 MIN: CPT

## 2018-05-01 PROCEDURE — 700112 HCHG RX REV CODE 229: Performed by: NURSE PRACTITIONER

## 2018-05-01 PROCEDURE — 85025 COMPLETE CBC W/AUTO DIFF WBC: CPT

## 2018-05-01 PROCEDURE — 80053 COMPREHEN METABOLIC PANEL: CPT

## 2018-05-01 PROCEDURE — 83605 ASSAY OF LACTIC ACID: CPT

## 2018-05-01 PROCEDURE — 700101 HCHG RX REV CODE 250: Performed by: NURSE PRACTITIONER

## 2018-05-01 PROCEDURE — G8978 MOBILITY CURRENT STATUS: HCPCS | Mod: CJ

## 2018-05-01 RX ORDER — METOCLOPRAMIDE HYDROCHLORIDE 5 MG/ML
10 INJECTION INTRAMUSCULAR; INTRAVENOUS EVERY 6 HOURS
Status: DISCONTINUED | OUTPATIENT
Start: 2018-05-01 | End: 2018-05-03

## 2018-05-01 RX ORDER — BACLOFEN 10 MG/1
10 TABLET ORAL 3 TIMES DAILY
Status: DISCONTINUED | OUTPATIENT
Start: 2018-05-01 | End: 2018-05-03

## 2018-05-01 RX ORDER — BACLOFEN 500 UG/ML
10 INJECTION, SOLUTION INTRATHECAL ONCE
Status: DISCONTINUED | OUTPATIENT
Start: 2018-05-01 | End: 2018-05-01

## 2018-05-01 RX ADMIN — OXYCODONE HYDROCHLORIDE 10 MG: 10 TABLET ORAL at 11:43

## 2018-05-01 RX ADMIN — METOCLOPRAMIDE 10 MG: 5 INJECTION, SOLUTION INTRAMUSCULAR; INTRAVENOUS at 11:18

## 2018-05-01 RX ADMIN — AMIODARONE HYDROCHLORIDE 150 MG: 50 INJECTION, SOLUTION INTRAVENOUS at 21:10

## 2018-05-01 RX ADMIN — STANDARDIZED SENNA CONCENTRATE AND DOCUSATE SODIUM 1 TABLET: 8.6; 5 TABLET, FILM COATED ORAL at 21:17

## 2018-05-01 RX ADMIN — BACLOFEN 10 MG: 10 TABLET ORAL at 14:14

## 2018-05-01 RX ADMIN — OXYCODONE HYDROCHLORIDE 5 MG: 5 TABLET ORAL at 08:21

## 2018-05-01 RX ADMIN — DOCUSATE SODIUM 100 MG: 100 CAPSULE ORAL at 08:22

## 2018-05-01 RX ADMIN — MUPIROCIN 1 APPLICATION: 20 OINTMENT TOPICAL at 21:17

## 2018-05-01 RX ADMIN — POTASSIUM CHLORIDE 20 MEQ: 1500 TABLET, EXTENDED RELEASE ORAL at 21:17

## 2018-05-01 RX ADMIN — SODIUM CHLORIDE: 9 INJECTION, SOLUTION INTRAVENOUS at 14:14

## 2018-05-01 RX ADMIN — POTASSIUM CHLORIDE 20 MEQ: 1500 TABLET, EXTENDED RELEASE ORAL at 08:23

## 2018-05-01 RX ADMIN — BACLOFEN 10 MG: 10 TABLET ORAL at 10:22

## 2018-05-01 RX ADMIN — METOPROLOL TARTRATE 25 MG: 25 TABLET, FILM COATED ORAL at 09:00

## 2018-05-01 RX ADMIN — CLOPIDOGREL 75 MG: 75 TABLET, FILM COATED ORAL at 08:22

## 2018-05-01 RX ADMIN — LOSARTAN POTASSIUM 50 MG: 25 TABLET, FILM COATED ORAL at 08:21

## 2018-05-01 RX ADMIN — AMIODARONE HYDROCHLORIDE 400 MG: 200 TABLET ORAL at 06:09

## 2018-05-01 RX ADMIN — ENOXAPARIN SODIUM 40 MG: 100 INJECTION SUBCUTANEOUS at 08:22

## 2018-05-01 RX ADMIN — ASPIRIN 81 MG: 81 TABLET, COATED ORAL at 08:21

## 2018-05-01 RX ADMIN — AMIODARONE HYDROCHLORIDE 400 MG: 200 TABLET ORAL at 18:01

## 2018-05-01 RX ADMIN — AMIODARONE HYDROCHLORIDE 1 MG/MIN: 50 INJECTION, SOLUTION INTRAVENOUS at 14:14

## 2018-05-01 RX ADMIN — OXYCODONE HYDROCHLORIDE 10 MG: 10 TABLET ORAL at 18:01

## 2018-05-01 RX ADMIN — METOPROLOL TARTRATE 25 MG: 25 TABLET, FILM COATED ORAL at 21:17

## 2018-05-01 RX ADMIN — MUPIROCIN 1 APPLICATION: 20 OINTMENT TOPICAL at 08:23

## 2018-05-01 RX ADMIN — FUROSEMIDE 40 MG: 10 INJECTION, SOLUTION INTRAMUSCULAR; INTRAVENOUS at 18:01

## 2018-05-01 RX ADMIN — FUROSEMIDE 40 MG: 10 INJECTION, SOLUTION INTRAMUSCULAR; INTRAVENOUS at 06:09

## 2018-05-01 RX ADMIN — TRAMADOL HYDROCHLORIDE 50 MG: 50 TABLET, COATED ORAL at 10:24

## 2018-05-01 RX ADMIN — METOCLOPRAMIDE 10 MG: 5 INJECTION, SOLUTION INTRAMUSCULAR; INTRAVENOUS at 18:01

## 2018-05-01 RX ADMIN — ROSUVASTATIN CALCIUM 20 MG: 10 TABLET, FILM COATED ORAL at 21:16

## 2018-05-01 RX ADMIN — BACLOFEN 10 MG: 10 TABLET ORAL at 21:17

## 2018-05-01 ASSESSMENT — ENCOUNTER SYMPTOMS
EYE PAIN: 0
DEPRESSION: 0
MUSCULOSKELETAL NEGATIVE: 1
EYES NEGATIVE: 1
SHORTNESS OF BREATH: 0
FOCAL WEAKNESS: 0
COUGH: 0
NAUSEA: 0
PSYCHIATRIC NEGATIVE: 1
SPUTUM PRODUCTION: 0
PALPITATIONS: 0
DOUBLE VISION: 0
DIARRHEA: 0
SPEECH CHANGE: 0
SENSORY CHANGE: 0
NEUROLOGICAL NEGATIVE: 1
FEVER: 0
CONSTIPATION: 1
NERVOUS/ANXIOUS: 0
NECK PAIN: 0
RESPIRATORY NEGATIVE: 1
BLURRED VISION: 0
HEADACHES: 0
SORE THROAT: 0
CHILLS: 0
MEMORY LOSS: 0
WEAKNESS: 1
BACK PAIN: 0
VOMITING: 0
ABDOMINAL PAIN: 1

## 2018-05-01 ASSESSMENT — COGNITIVE AND FUNCTIONAL STATUS - GENERAL
CLIMB 3 TO 5 STEPS WITH RAILING: A LOT
MOVING TO AND FROM BED TO CHAIR: A LITTLE
TURNING FROM BACK TO SIDE WHILE IN FLAT BAD: A LITTLE
SUGGESTED CMS G CODE MODIFIER MOBILITY: CK
WALKING IN HOSPITAL ROOM: A LITTLE
MOVING FROM LYING ON BACK TO SITTING ON SIDE OF FLAT BED: A LITTLE
MOBILITY SCORE: 17
STANDING UP FROM CHAIR USING ARMS: A LITTLE

## 2018-05-01 ASSESSMENT — PAIN SCALES - GENERAL
PAINLEVEL_OUTOF10: 1
PAINLEVEL_OUTOF10: 6
PAINLEVEL_OUTOF10: 4
PAINLEVEL_OUTOF10: 4
PAINLEVEL_OUTOF10: 3
PAINLEVEL_OUTOF10: 0
PAINLEVEL_OUTOF10: 3
PAINLEVEL_OUTOF10: 1
PAINLEVEL_OUTOF10: 2
PAINLEVEL_OUTOF10: 3
PAINLEVEL_OUTOF10: 5

## 2018-05-01 ASSESSMENT — GAIT ASSESSMENTS
DEVIATION: DECREASED BASE OF SUPPORT
GAIT LEVEL OF ASSIST: CONTACT GUARD ASSIST
ASSISTIVE DEVICE: FRONT WHEEL WALKER
DISTANCE (FEET): 15

## 2018-05-01 NOTE — PROGRESS NOTES
Cardiovascular Surgery Progress Note    Name: Jae Venegas  MRN: [id number]  : 1970  Admit Date: 2018 10:33 PM  Procedure:  Procedure(s) and Anesthesia Type:     * MULTIPLE CORONARY ARTERY BYPASS ENDO VEIN HARVEST - X4, BILATERAL INTERNAL MAMMARY ARTERY HARVEST - General     * [place] - General  4 Day Post-Op    Vitals:  Patient Vitals for the past 8 hrs:   Temp SpO2 O2 Delivery O2 (LPM) Pulse Heart Rate (Monitored) Resp BP NIBP Weight   18 0836 - 94 % - 2 89 89 (!) 22 - - -   18 0800 36.3 °C (97.4 °F) 93 % - - 71 - (!) 30 126/92 - -   18 0600 - - - - - - - - - 118.3 kg (260 lb 12.9 oz)   18 0400 36.4 °C (97.5 °F) 98 % Silicone Nasal Cannula 3 79 79 17 - 113/86 -   18 0200 - - Silicone Nasal Cannula 3 - - - - - -     Temp (24hrs), Av.1 °C (97 °F), Min:35.8 °C (96.5 °F), Max:36.4 °C (97.5 °F)      Respiratory:    Respiration: (!) 22, Pulse Oximetry: 94 %, O2 Daily Delivery Respiratory : Silicone Nasal Cannula  Chest Tube Group 1 (A) Mediastinal round-Kevin 24-Tube Status / Drainage: Patent;Sutured in Place;Serosanguinous, Chest Tube Group 2 (B) Mediastinal round-Kevin 24-Tube Status / Drainage: Patent;Sutured in Place;Serosanguinous, Chest Tube Group 1 (A) Mediastinal round-Kevin 24-Device: Suction 20 cm Water;Dry Closed Drainage System, Chest Tube Group 2 (B) Mediastinal round-Kevin 24-Device: Dry Closed Drainage System;Suction 20 cm Water  Chest Tube Drains:          Fluids:    Intake/Output Summary (Last 24 hours) at 18 0954  Last data filed at 18 0600   Gross per 24 hour   Intake            899.2 ml   Output             2600 ml   Net          -1700.8 ml     Admit weight: Weight: 108.9 kg (240 lb)  Current weight: Weight: 118.3 kg (260 lb 12.9 oz) (18 0600)    Labs:  Recent Labs      18   0417  18   0518  18   0415   WBC  13.9*  15.8*  16.1*   RBC  3.78*  3.73*  3.99*   HEMOGLOBIN  11.6*  11.3*  12.2*   HEMATOCRIT  34.2*  34.5*  36.1*   MCV   90.5  92.5  90.5   MCH  30.7  30.3  30.6   MCHC  33.9  32.8*  33.8   RDW  43.8  45.4  43.0   PLATELETCT  102*  143*  202   MPV  10.4  10.3  10.3     Recent Labs      04/29/18 0417 04/30/18 0518  05/01/18   0415   NEUTSPOLYS  79.00*  83.20*  79.30*   LYMPHOCYTES  7.90*  5.30*  6.70*   MONOCYTES  12.50  10.30  12.80   EOSINOPHILS  0.10  0.10  0.20   BASOPHILS  0.10  0.30  0.40     Recent Labs      04/29/18 0417 04/30/18 0518  05/01/18 0415   SODIUM  135  132*  132*   POTASSIUM  4.6  4.3  3.9   CHLORIDE  102  98  93*   CO2  28  30  31   GLUCOSE  113*  125*  117*   BUN  24*  30*  33*   CREATININE  1.08  0.96  1.05   CALCIUM  8.3*  8.6  8.7           Medications:  • metoclopramide  10 mg     • baclofen  10 mg     • furosemide  40 mg     • potassium chloride SA  20 mEq     • amiodarone  400 mg     • docusate sodium  100 mg      And   • senna-docusate  1 Tab     • enoxaparin  40 mg     • mupirocin  1 Application     • metoprolol  25 mg     • aspirin EC  81 mg     • clopidogrel  75 mg     • losartan  50 mg     • rosuvastatin  20 mg         Exam:   Review of Systems   Constitutional: Positive for malaise/fatigue.   HENT: Negative.    Eyes: Negative.    Respiratory: Negative.    Cardiovascular: Chest pain: MSK.   Gastrointestinal: Positive for constipation.   Genitourinary: Negative.    Musculoskeletal: Negative.    Skin: Negative.    Neurological: Negative.    Endo/Heme/Allergies: Negative.    Psychiatric/Behavioral: Negative.        Physical Exam   Constitutional: He is oriented to person, place, and time. He appears well-developed and well-nourished.   HENT:   Head: Normocephalic.   Eyes: Pupils are equal, round, and reactive to light.   Neck: Normal range of motion. No JVD present.   Cardiovascular: Normal rate, regular rhythm and normal heart sounds.    Pulmonary/Chest: Effort normal and breath sounds normal.   Dim bases, shallow breaths   Abdominal: He exhibits distension. Bowel sounds are increased. There  is tenderness in the right upper quadrant. There is no guarding.   Musculoskeletal: Normal range of motion. He exhibits edema.   Neurological: He is alert and oriented to person, place, and time.   Skin: Skin is warm and dry.   prevena to chest, EVH sites CDI   Psychiatric: He has a normal mood and affect. His behavior is normal. Judgment and thought content normal.       Quality Measures:   Quality-Core Measures   Gill catheter::  No Gill  Central line in place:  Need for access  DVT prophylaxis pharmacological::  Contraindicated - High bleeding risk  DVT prophylaxis - mechanical:  SCDs  Ulcer Prophylaxis::  Yes  Assessed for rehabilitation services:  Patient returned to prior level of function, rehabilitation not indicated at this time    Assessment/Plan:  POD 1 HDS on low dose epi.  NSR. Vented, ABGs good, weaning to extubated this AM. CT output min, no airlekas.  Will keep CTs/gill today.  Plan to start diuresis once BP stable off epi.  Continuing ABX x 72 hours for bilateral ELIANE harvests.  CPM.  POD 2 HDS< NSR (afib with RVR this AM, converted on amio gtt protocol). Pain issues, shallow breathing.  CT output min, no airleak.  Passing gas, no BM.  PLAN:  Continue amio protocol. Add PO dilaudid.  Stress IS use.  DC mediastinal CTs and gill.  Diurese.   POD 3 HDS, Afib for 40 minutes this am- now SR- on amio gtt, CT bilat pleural tubes- 50/70 cc, sedate- dc ms contin, inc diuresis, amb, enc IS  POD 4 HDS, NSR on amio, min output blakes.  ABD distention, hyper/distant bowel sounds, RUQ pain.  States passing gas, but no BM.  PLAN:  DC blakes.  Continue diuresis.  ABD xray now.  Add reglan. Limit narcotics. AMB/IS.    Active Hospital Problems    Diagnosis   • STEMI (ST elevation myocardial infarction) (HCC) [I21.3]     Priority: High   • HTN (hypertension) [I10]

## 2018-05-01 NOTE — CARE PLAN
Problem: Post Op Day 3 CABG/Heart Valve replacement  Goal: Optimal care of the post op CABG/Heart Valve replacement post op day 3    Intervention: Daily Weights  Completed    Intervention: Shower daily and clean incisions twice daily with soap and water  Completed    Intervention: Up in chair for all meals  Completed    Intervention: Ambulate, increasing the distance each time x 3 and before bed  Pt ambulated around entire unit tonight.  Intervention: IS q 1 hour while awake and record best IS volume  Pt is pulling 1800 cc tonight. Effort is good.   Intervention: Consider removal of gill, chest tube and pacer wire if not already done  Completed    Intervention: Case management and  for discharge needs  Ongoing    Intervention: Discharge planning (See discharge barriers/planning problem on care plan)  Ongoing

## 2018-05-01 NOTE — THERAPY
"Physical Therapy Evaluation completed.   Bed Mobility:  Supine to Sit: Contact Guard Assist  Transfers: Sit to Stand: Contact Guard Assist  Gait: Level Of Assist: Contact Guard Assist with Front-Wheel Walker       Plan of Care: Will benefit from Physical Therapy 3 times per week  Discharge Recommendations: Equipment: Will Continue to Assess for Equipment Needs.     Pt s/p CABG x4 seen for PT cardiac rehab phase I eval and mobility training. He overall appeared limited by nausea and during brief gait distance, experienced several bouts of emesis. Pt requires reinforcement for cardiac education as he had somewhat of a flat affect throughout. He will benefit from additional acute PT services to progress mobility and reinforce precautions.     See \"Rehab Therapy-Acute\" Patient Summary Report for complete documentation.     "

## 2018-05-01 NOTE — PROGRESS NOTES
Pulmonary Critical Care Progress Note      Date of Admission:  4/25/2018  Date of Service:  5/1/2018  Chief Complaint:  Postoperative ventilator management for 4 V CABG    Interval Events:  24 hour interval history reviewed   - POD#4 for 4V CABG   - no events overnight   - a/ox4   - fatigued this morning with flat affect   - SR 70-80s   - SBP 120s   - amio at 0.5   - afebrile   - cardiac diet   - very small BMs and uncomfortable    - distended belly/firm   - UOP 1000cc with urinal   - right IJ TLC   - chest tubes removed today   - CXR(reviewed 4/30): hypoinlfated, no infiltrates    Yesterday's Events:   - POD#3 for 4V CABG   - SR/a-fib 70-120s   - -120s   - amio at 0.5   - last BM pta   - uop of 700cc with urinal   - tolerating regular diet   - IS at 1000cc   - CXR(reviewed):   - lovenox   - chest tubes still in place    Review of Systems   Constitutional: Negative for chills, fever and malaise/fatigue.   HENT: Negative for congestion, hearing loss and sore throat.    Eyes: Negative for blurred vision, double vision and pain.   Respiratory: Negative for cough, sputum production and shortness of breath.    Cardiovascular: Positive for chest pain. Negative for palpitations and leg swelling.   Gastrointestinal: Positive for abdominal pain and constipation. Negative for diarrhea, nausea and vomiting.   Genitourinary: Negative.    Musculoskeletal: Negative for back pain and neck pain.   Skin: Negative.    Neurological: Positive for weakness. Negative for sensory change, speech change, focal weakness and headaches.   Endo/Heme/Allergies: Negative.    Psychiatric/Behavioral: Negative for depression and memory loss. The patient is not nervous/anxious.        Physical Exam   Constitutional: He is oriented to person, place, and time and well-developed, well-nourished, and in no distress. No distress.   HENT:   Right Ear: External ear normal.   Left Ear: External ear normal.   Nose: Nose normal.   Mouth/Throat:  Oropharynx is clear and moist. No oropharyngeal exudate.   Eyes: Conjunctivae and EOM are normal. Pupils are equal, round, and reactive to light. No scleral icterus.   Neck: Normal range of motion. Neck supple. No thyromegaly present.   Cardiovascular: Normal rate, regular rhythm, normal heart sounds and intact distal pulses.    No murmur heard.       Pulmonary/Chest: Effort normal and breath sounds normal. No stridor. No respiratory distress. He exhibits no tenderness.   Abdominal: Soft. Bowel sounds are normal. He exhibits distension. He exhibits no mass. There is no tenderness. There is no rebound and no guarding.   Musculoskeletal: Normal range of motion. He exhibits no edema or tenderness.   Lymphadenopathy:     He has no cervical adenopathy.   Neurological: He is alert and oriented to person, place, and time. He has normal reflexes. Gait normal. Coordination normal.   Skin: Skin is warm and dry. No rash noted. He is not diaphoretic.   Psychiatric: Mood, memory, affect and judgment normal.   Nursing note and vitals reviewed.  exam unchanged except for abdomen    PFSH:  No change.    Respiratory:     Pulse Oximetry: 98 %      Recent Labs      04/28/18   1357  04/28/18   2151   ISTATAPH  7.464  7.387*   ISTATAPCO2  38.4*  41.6*   ISTATAPO2  82  98*   ISTATATCO2  29  26   XVWEYBT4WHS  97  98   ISTATARTHCO3  27.6*  25.0   ISTATARTBE  4*  0   ISTATTEMP  38.5 C  38.3 C   ISTATFIO2  50  60   ISTATSPEC  Arterial  Arterial   ISTATAPHTC  7.441  7.368*   HSHONRUR3YL  91*  106*       HemoDynamics:  Pulse: 79, Heart Rate (Monitored): 79  Blood Pressure: 121/76, NIBP: 113/86        Fluids:  Intake/Output       04/29/18 0700 - 04/30/18 0659 04/30/18 0700 - 05/01/18 0659 05/01/18 0700 - 05/02/18 0659      5606-9395 4135-9634 Total 9028-1860 6137-6634 Total 7472-8899 2613-5336 Total       Intake    P.O.  --  280 280  180  480 660  --  -- --    P.O. -- 280 280 180 480 660 -- -- --    I.V.  316  399.6 715.6  271.8  34 305.8  --   -- --    Amiodarone Volume 316 399.6 715.6 271.8 34 305.8 -- -- --    Total Intake 316 679.6 995.6 451.8 514 965.8 -- -- --       Output    Urine  1275  700 1975  1350  900 2250  --  -- --    Number of Times Voided -- 2 x 2 x 4 x -- 4 x -- -- --    Urine Void (mL) (non-catheter) --  900 2250 -- -- --    Output (mL) ([REMOVED] Urinary Catheter Indwelling Catheter) 1275 -- 1275 -- -- -- -- -- --    Stool  --  -- --  --  -- --  --  -- --    Number of Times Stooled 0 x 0 x 0 x 0 x -- 0 x -- -- --    Chest Tube  118  120 238  60  60 120  --  -- --    Output (mL) (Chest Tube Group 1 (A) Mediastinal round-Kevin 24) 40 50 90 20 30 50 -- -- --    Output (mL) (Chest Tube Group 2 (B) Mediastinal round-Kevin 24) 78 70 148 40 30 70 -- -- --    Total Output 2520 849 4541 0480 777 3485 -- -- --       Net I/O     -1077 -140.4 -1217.4 -958.2 -446 -1404.2 -- -- --           Recent Labs      18   SODIUM  135  132*   POTASSIUM  4.6  4.3   CHLORIDE  102  98   CO2  28  30   BUN  24*  30*   CREATININE  1.08  0.96   MAGNESIUM  2.2   --    CALCIUM  8.3*  8.6       GI/Nutrition:    Liver Function  Recent Labs      18   ALTSGPT  45  43   ASTSGOT  93*  65*   ALKPHOSPHAT  50  56   TBILIRUBIN  3.5*  2.4*   GLUCOSE  113*  125*       Heme:  Recent Labs      18   RBC  3.78*  3.73*  3.99*   HEMOGLOBIN  11.6*  11.3*  12.2*   HEMATOCRIT  34.2*  34.5*  36.1*   PLATELETCT  102*  143*  202       Infectious Disease:  Temp  Av.1 °C (97 °F)  Min: 35.8 °C (96.5 °F)  Max: 36.4 °C (97.5 °F)    Recent Labs      18   0417  18   0518  18   0415   WBC  13.9*  15.8*  16.1*   NEUTSPOLYS  79.00*  83.20*  79.30*   LYMPHOCYTES  7.90*  5.30*  6.70*   MONOCYTES  12.50  10.30  12.80   EOSINOPHILS  0.10  0.10  0.20   BASOPHILS  0.10  0.30  0.40   ASTSGOT  93*  65*   --    ALTSGPT  45  43   --    ALKPHOSPHAT  50  56   --    TBILIRUBIN   3.5*  2.4*   --      Current Facility-Administered Medications   Medication Dose Frequency Provider Last Rate Last Dose   • furosemide (LASIX) injection 40 mg  40 mg BID DIURETIC Theresa Reddy A.P.N.   40 mg at 04/30/18 1623   • potassium chloride SA (Kdur) tablet 20 mEq  20 mEq BID Theresa Reddy A.P.N.   20 mEq at 04/30/18 2009   • amiodarone (CORDARONE) 450 mg in D5W 250 mL Infusion  1 mg/min Continuous Alisson Escobar, A.P.N. 17 mL/hr at 04/30/18 2141 0.5 mg/min at 04/30/18 2141   • amiodarone (CORDARONE) tablet 400 mg  400 mg BID Alisson Escobar, A.P.N.   400 mg at 04/30/18 1802   • HYDROmorphone (DILAUDID) tablet 2 mg  2 mg Q3HRS PRN Alisson Escobar, A.P.N.   2 mg at 04/29/18 1534   • ipratropium-albuterol (DUONEB) nebulizer solution 3 mL  3 mL Q4H PRN (RT) Andres Reeves M.D.   3 mL at 04/28/18 2141   • Respiratory Care per Protocol   Continuous RT Alisson Escobar, A.P.N.       • NS infusion   Continuous Alisson Escobar, A.P.N.   Stopped at 04/30/18 0700   • Pharmacy Consult Request ...Pain Management Review 1 Each  1 Each PRN Alisson Escobar, A.P.N.       • docusate sodium (COLACE) capsule 100 mg  100 mg QAM Alisson Escobar, A.P.N.   100 mg at 04/30/18 0810    And   • senna-docusate (PERICOLACE or SENOKOT S) 8.6-50 MG per tablet 1 Tab  1 Tab Nightly Alisson Escobar, A.P.N.   1 Tab at 04/30/18 2009    And   • senna-docusate (PERICOLACE or SENOKOT S) 8.6-50 MG per tablet 1 Tab  1 Tab Q24HRS PRN Alisson Escobar, A.P.N.   1 Tab at 04/30/18 1032    And   • lactulose 20 GM/30ML solution 30 mL  30 mL Q24HRS PRN Alisson Escobar, A.P.N.   30 mL at 04/30/18 1955    And   • bisacodyl (DULCOLAX) suppository 10 mg  10 mg Q24HRS PRN Alisson Escobar, A.P.N.        And   • fleet enema 133 mL  1 Each Once PRN Alisson Escobar, A.P.N.       • enoxaparin (LOVENOX) inj 40 mg  40 mg DAILY Alisson Escobar, A.P.N.   40 mg at 04/30/18 1033   • mupirocin (BACTROBAN) 2 % ointment 1 Application  1 Application  BID Alisson Escobar, A.P.N.   1 Application at 04/30/18 2100   • metoprolol (LOPRESSOR) tablet 25 mg  25 mg BID Alisson Escobar, A.P.N.   25 mg at 04/30/18 2100   • aspirin EC (ECOTRIN) tablet 81 mg  81 mg DAILY Alisson Escobar, A.P.N.   81 mg at 04/30/18 1033   • clopidogrel (PLAVIX) tablet 75 mg  75 mg DAILY Alisson Escobar, A.P.N.   75 mg at 04/30/18 1033   • oxyCODONE immediate-release (ROXICODONE) tablet 5 mg  5 mg Q3HRS PRN Alisson Escobar, A.P.N.   5 mg at 04/30/18 1910   • oxyCODONE immediate release (ROXICODONE) tablet 10 mg  10 mg Q3HRS PRN Alisson Escobar, A.P.N.   10 mg at 04/29/18 1741   • tramadol (ULTRAM) 50 MG tablet 50 mg  50 mg Q4HRS PRN Alisson Escobar, A.P.N.   50 mg at 04/30/18 1802   • ondansetron (ZOFRAN) syringe/vial injection 4 mg  4 mg Q6HRS PRN Alisson Escobar, A.P.N.        Or   • prochlorperazine (COMPAZINE) injection 10 mg  10 mg Q6HRS PRN Alisson Escobar, A.P.N.        Or   • promethazine (PHENERGAN) suppository 25 mg  25 mg Q6HRS PRN Alisson Escobar, A.P.N.       • acetaminophen (TYLENOL) tablet 650 mg  650 mg Q4HRS PRN Alisson Escobar, A.P.N.   650 mg at 04/30/18 1910    Or   • acetaminophen (TYLENOL) suppository 650 mg  650 mg Q4HRS PRN Alisson Escobar, A.P.N.       • mag hydrox-al hydrox-simeth (MAALOX PLUS ES or MYLANTA DS) suspension 30 mL  30 mL Q4HRS PRN Alisson Escobar, A.P.N.   30 mL at 04/30/18 1925   • diphenhydrAMINE (BENADRYL) tablet/capsule 25 mg  25 mg HS PRN - MR X 1 Alisson Escobar, A.P.N.   25 mg at 04/29/18 0035   • dextrose 50% (D50W) injection 25 mL  25 mL PRN Alisson Escobar, A.P.N.       • losartan (COZAAR) tablet 50 mg  50 mg DAILY Adelina Vieyra M.D.   50 mg at 04/30/18 0810   • rosuvastatin (CRESTOR) tablet 20 mg  20 mg Q EVENING Leighton Cook M.D.   20 mg at 04/30/18 2009     Last reviewed on 4/28/2018  8:25 PM by Iain An R.N.    Quality  Measures:  Labs reviewed, Medications reviewed and Radiology images reviewed  Bravo catheter: No  Bravo  Central line in place: Need for access    DVT Prophylaxis: Enoxaparin (Lovenox)  DVT prophylaxis - mechanical: SCDs  Ulcer prophylaxis: Not indicated          Assessment and Plan:    Postoperative ventilator management   - extubated 4/28   - IS/PEP, mobilize   - cont RT protocols  Status post 4-vessel coronary bypass grafting - 4/27   - aspirin, Plavix, statin, BB   - [facility] protocols  Status post inferior STEMI   - Status post thrombolytic therapy at outside facility   - Status post emergent angioplasty of subtotally occluded mid circumflex coronary artery   - meds as above  Acute systolic heart failure   - follow I/O closely   - cont lasix  History of hypertension   - still mild hypotension   - losartan when clinically appropriate  Hypokalemia   - Replete potassium  Constipation   - serial abd exam   - x-ray today   - cont stool softeners   - start suppositories   - would start miralax   - encourage ambulation  Hiccups   Started reglan IV and baclofen orally  AF/RVR   - controlled and now in SR   - restarted amiodarone infusion   - cont oral amiodarone    Will continue to follow in the ICU  [id number]

## 2018-05-01 NOTE — CARE PLAN
Problem: Safety  Goal: Will remain free from injury  Outcome: PROGRESSING AS EXPECTED  Call light within reach with education and demonstration with teach back on calling for assistance, bed in lowest locked position, hourly rounding in place. Tele box on and rhythm verified. Bed alarm on. Treaded socks on. Appropriate signs placed. Mobility assessed.     Problem: Venous Thromboembolism (VTW)/Deep Vein Thrombosis (DVT) Prevention:  Goal: Patient will participate in Venous Thrombosis (VTE)/Deep Vein Thrombosis (DVT)Prevention Measures  Outcome: PROGRESSING SLOWER THAN EXPECTED   04/30/18 1900 05/01/18 0800   Mechanical/VTE Prophylaxis   Mechanical Prophylaxis  --  NICOLE Hose (Graduated Compression Stockings)   NICOLE Hose (Graduated Compression Stockings) --  On   SCDs, Sequential Compression Device On --    OTHER   Risk Assessment Score --  4   VTE RISK --  High   Pharmacologic Prophylaxis Used --  LMWH: Enoxaparin(Lovenox)       Problem: Post Op Day 4 CABG/Heart Valve Replacement  Goal: Optimal care of the Post Op CABG/Heart Valve replacement Post Op Day 4  Outcome: PROGRESSING AS EXPECTED  Pt has been up to chair for all meals plus some extra time, pt has been ambulating with no concerns, while pt is awake he continues to use the IS. Pts chest tube has been removed.

## 2018-05-01 NOTE — PROGRESS NOTES
Paged Theresa Reddy APN regarding pt's intractable hiccups tonight. Orders receive for one-time doses of reglan and baclofen tonight.

## 2018-05-01 NOTE — CARE PLAN
Problem: Safety  Goal: Will remain free from falls  Outcome: PROGRESSING AS EXPECTED  Pt educated on use of call light, call light always within reach, pt calls for help, steady on his feet,     Problem: Post Op Day 4 CABG/Heart Valve Replacement  Goal: Optimal care of the Post Op CABG/Heart Valve replacement Post Op Day 4  Outcome: PROGRESSING AS EXPECTED    Intervention: Daily Weights  Daily weight charted in epic   Intervention: Up in chair for all meals  Pt in chair all night

## 2018-05-01 NOTE — CARE PLAN
Problem: Post Op Day 3 CABG/Heart Valve replacement  Goal: Optimal care of the post op CABG/Heart Valve replacement post op day 3    Intervention: Daily Weights  Done.  Intervention: Shower daily and clean incisions twice daily with soap and water  Pt showered; unable to clean midline sternal incision as Prevena is in place. Incision care education provided.  Intervention: Up in chair for all meals  Done.  Intervention: Ambulate, increasing the distance each time x 3 and before bed  Completed. Pt ambulated up to length of complete lap around unit.  Intervention: IS q 1 hour while awake and record best IS volume  Done. Best volume 1500 mL.  Intervention: Consider removal of gill, chest tube and pacer wire if not already done  Pacer wires removed by APN; Gill already removed; pleural chest tubes remain in place.  Intervention: Case management and  for discharge needs  In progress.  Intervention: Discharge planning (See discharge barriers/planning problem on care plan)  Wound care education provided. Use of IS elaborated and practiced extensively.

## 2018-05-02 LAB
ALBUMIN SERPL BCP-MCNC: 3 G/DL (ref 3.2–4.9)
ALBUMIN/GLOB SERPL: 1 G/DL
ALP SERPL-CCNC: 75 U/L (ref 30–99)
ALT SERPL-CCNC: 41 U/L (ref 2–50)
ANION GAP SERPL CALC-SCNC: 8 MMOL/L (ref 0–11.9)
AST SERPL-CCNC: 37 U/L (ref 12–45)
BASOPHILS # BLD AUTO: 0.4 % (ref 0–1.8)
BASOPHILS # BLD: 0.04 K/UL (ref 0–0.12)
BILIRUB SERPL-MCNC: 2.6 MG/DL (ref 0.1–1.5)
BUN SERPL-MCNC: 30 MG/DL (ref 8–22)
CALCIUM SERPL-MCNC: 8.5 MG/DL (ref 8.5–10.5)
CHLORIDE SERPL-SCNC: 93 MMOL/L (ref 96–112)
CO2 SERPL-SCNC: 33 MMOL/L (ref 20–33)
CREAT SERPL-MCNC: 1.06 MG/DL (ref 0.5–1.4)
EOSINOPHIL # BLD AUTO: 0.08 K/UL (ref 0–0.51)
EOSINOPHIL NFR BLD: 0.7 % (ref 0–6.9)
ERYTHROCYTE [DISTWIDTH] IN BLOOD BY AUTOMATED COUNT: 42.2 FL (ref 35.9–50)
GLOBULIN SER CALC-MCNC: 3.1 G/DL (ref 1.9–3.5)
GLUCOSE SERPL-MCNC: 120 MG/DL (ref 65–99)
HCT VFR BLD AUTO: 37.7 % (ref 42–52)
HGB BLD-MCNC: 13.1 G/DL (ref 14–18)
IMM GRANULOCYTES # BLD AUTO: 0.11 K/UL (ref 0–0.11)
IMM GRANULOCYTES NFR BLD AUTO: 1 % (ref 0–0.9)
LYMPHOCYTES # BLD AUTO: 1.03 K/UL (ref 1–4.8)
LYMPHOCYTES NFR BLD: 9.6 % (ref 22–41)
MCH RBC QN AUTO: 30.8 PG (ref 27–33)
MCHC RBC AUTO-ENTMCNC: 34.7 G/DL (ref 33.7–35.3)
MCV RBC AUTO: 88.7 FL (ref 81.4–97.8)
MONOCYTES # BLD AUTO: 2.36 K/UL (ref 0–0.85)
MONOCYTES NFR BLD AUTO: 21.9 % (ref 0–13.4)
NEUTROPHILS # BLD AUTO: 7.15 K/UL (ref 1.82–7.42)
NEUTROPHILS NFR BLD: 66.4 % (ref 44–72)
NRBC # BLD AUTO: 0 K/UL
NRBC BLD-RTO: 0 /100 WBC
PLATELET # BLD AUTO: 213 K/UL (ref 164–446)
PMV BLD AUTO: 10 FL (ref 9–12.9)
POTASSIUM SERPL-SCNC: 3.7 MMOL/L (ref 3.6–5.5)
PROT SERPL-MCNC: 6.1 G/DL (ref 6–8.2)
RBC # BLD AUTO: 4.25 M/UL (ref 4.7–6.1)
SODIUM SERPL-SCNC: 134 MMOL/L (ref 135–145)
WBC # BLD AUTO: 10.8 K/UL (ref 4.8–10.8)

## 2018-05-02 PROCEDURE — A9270 NON-COVERED ITEM OR SERVICE: HCPCS | Performed by: NURSE PRACTITIONER

## 2018-05-02 PROCEDURE — A9270 NON-COVERED ITEM OR SERVICE: HCPCS | Performed by: INTERNAL MEDICINE

## 2018-05-02 PROCEDURE — 700105 HCHG RX REV CODE 258: Performed by: NURSE PRACTITIONER

## 2018-05-02 PROCEDURE — A9270 NON-COVERED ITEM OR SERVICE: HCPCS | Performed by: HOSPITALIST

## 2018-05-02 PROCEDURE — 700102 HCHG RX REV CODE 250 W/ 637 OVERRIDE(OP): Performed by: INTERNAL MEDICINE

## 2018-05-02 PROCEDURE — 700102 HCHG RX REV CODE 250 W/ 637 OVERRIDE(OP): Performed by: NURSE PRACTITIONER

## 2018-05-02 PROCEDURE — 85025 COMPLETE CBC W/AUTO DIFF WBC: CPT

## 2018-05-02 PROCEDURE — 700102 HCHG RX REV CODE 250 W/ 637 OVERRIDE(OP): Performed by: HOSPITALIST

## 2018-05-02 PROCEDURE — 700111 HCHG RX REV CODE 636 W/ 250 OVERRIDE (IP): Performed by: NURSE PRACTITIONER

## 2018-05-02 PROCEDURE — 770020 HCHG ROOM/CARE - TELE (206)

## 2018-05-02 PROCEDURE — 80053 COMPREHEN METABOLIC PANEL: CPT

## 2018-05-02 RX ORDER — POTASSIUM CHLORIDE 20 MEQ/1
20 TABLET, EXTENDED RELEASE ORAL DAILY
Status: DISCONTINUED | OUTPATIENT
Start: 2018-05-03 | End: 2018-05-04

## 2018-05-02 RX ORDER — FUROSEMIDE 10 MG/ML
40 INJECTION INTRAMUSCULAR; INTRAVENOUS
Status: DISCONTINUED | OUTPATIENT
Start: 2018-05-03 | End: 2018-05-04

## 2018-05-02 RX ORDER — POTASSIUM CHLORIDE 20 MEQ/1
20 TABLET, EXTENDED RELEASE ORAL ONCE
Status: COMPLETED | OUTPATIENT
Start: 2018-05-02 | End: 2018-05-02

## 2018-05-02 RX ADMIN — CLOPIDOGREL 75 MG: 75 TABLET, FILM COATED ORAL at 07:28

## 2018-05-02 RX ADMIN — AMIODARONE HYDROCHLORIDE 0.5 MG/MIN: 50 INJECTION, SOLUTION INTRAVENOUS at 11:19

## 2018-05-02 RX ADMIN — AMIODARONE HYDROCHLORIDE 400 MG: 200 TABLET ORAL at 17:59

## 2018-05-02 RX ADMIN — POTASSIUM CHLORIDE 20 MEQ: 1500 TABLET, EXTENDED RELEASE ORAL at 07:29

## 2018-05-02 RX ADMIN — ROSUVASTATIN CALCIUM 20 MG: 10 TABLET, FILM COATED ORAL at 20:25

## 2018-05-02 RX ADMIN — AMIODARONE HYDROCHLORIDE 1 MG/MIN: 50 INJECTION, SOLUTION INTRAVENOUS at 01:35

## 2018-05-02 RX ADMIN — METOCLOPRAMIDE 10 MG: 5 INJECTION, SOLUTION INTRAMUSCULAR; INTRAVENOUS at 11:15

## 2018-05-02 RX ADMIN — BACLOFEN 10 MG: 10 TABLET ORAL at 17:59

## 2018-05-02 RX ADMIN — METOCLOPRAMIDE 10 MG: 5 INJECTION, SOLUTION INTRAMUSCULAR; INTRAVENOUS at 17:59

## 2018-05-02 RX ADMIN — METOPROLOL TARTRATE 37.5 MG: 25 TABLET, FILM COATED ORAL at 20:25

## 2018-05-02 RX ADMIN — ASPIRIN 81 MG: 81 TABLET, COATED ORAL at 07:29

## 2018-05-02 RX ADMIN — BACLOFEN 10 MG: 10 TABLET ORAL at 20:25

## 2018-05-02 RX ADMIN — LOSARTAN POTASSIUM 50 MG: 25 TABLET, FILM COATED ORAL at 07:28

## 2018-05-02 RX ADMIN — METOCLOPRAMIDE 10 MG: 5 INJECTION, SOLUTION INTRAMUSCULAR; INTRAVENOUS at 06:26

## 2018-05-02 RX ADMIN — METOCLOPRAMIDE 10 MG: 5 INJECTION, SOLUTION INTRAMUSCULAR; INTRAVENOUS at 00:08

## 2018-05-02 RX ADMIN — FUROSEMIDE 40 MG: 10 INJECTION, SOLUTION INTRAMUSCULAR; INTRAVENOUS at 06:26

## 2018-05-02 RX ADMIN — METOPROLOL TARTRATE 25 MG: 25 TABLET, FILM COATED ORAL at 07:29

## 2018-05-02 RX ADMIN — AMIODARONE HYDROCHLORIDE 400 MG: 200 TABLET ORAL at 06:26

## 2018-05-02 RX ADMIN — POTASSIUM CHLORIDE 20 MEQ: 1500 TABLET, EXTENDED RELEASE ORAL at 11:15

## 2018-05-02 RX ADMIN — TRAMADOL HYDROCHLORIDE 50 MG: 50 TABLET, COATED ORAL at 01:47

## 2018-05-02 RX ADMIN — ENOXAPARIN SODIUM 40 MG: 100 INJECTION SUBCUTANEOUS at 07:33

## 2018-05-02 RX ADMIN — BACLOFEN 10 MG: 10 TABLET ORAL at 07:28

## 2018-05-02 ASSESSMENT — PAIN SCALES - GENERAL
PAINLEVEL_OUTOF10: 3
PAINLEVEL_OUTOF10: 4
PAINLEVEL_OUTOF10: 3
PAINLEVEL_OUTOF10: 3
PAINLEVEL_OUTOF10: 0
PAINLEVEL_OUTOF10: 2
PAINLEVEL_OUTOF10: 0
PAINLEVEL_OUTOF10: 3
PAINLEVEL_OUTOF10: 0

## 2018-05-02 ASSESSMENT — ENCOUNTER SYMPTOMS
ABDOMINAL PAIN: 0
SORE THROAT: 0
FEVER: 0
PSYCHIATRIC NEGATIVE: 1
RESPIRATORY NEGATIVE: 1
CHILLS: 0
DEPRESSION: 0
SHORTNESS OF BREATH: 0
TREMORS: 0
NERVOUS/ANXIOUS: 0
WEAKNESS: 1
DIARRHEA: 1
PALPITATIONS: 0
BACK PAIN: 0
NECK PAIN: 0
CONSTIPATION: 1
SPEECH CHANGE: 0
ABDOMINAL PAIN: 1
NAUSEA: 0
HEADACHES: 0
INSOMNIA: 1
VOMITING: 0
SENSORY CHANGE: 0
MUSCULOSKELETAL NEGATIVE: 1
EYE PAIN: 0
CONSTIPATION: 0
FOCAL WEAKNESS: 0
EYES NEGATIVE: 1
NEUROLOGICAL NEGATIVE: 1
COUGH: 0
BLURRED VISION: 0
DOUBLE VISION: 0

## 2018-05-02 NOTE — PROGRESS NOTES
Pt back into a fib while ambulating unit. Rate 120-170s. Alisson Escobar notified. Orders received to stanley roberto and increase drip to 1mg/minute. APN also updated that Dr. Sanabria made patient NPO. Okay to give medications per APN.

## 2018-05-02 NOTE — PROGRESS NOTES
Cardiovascular Surgery Progress Note    Name: Jae Venegas  MRN: [id number]  : 1970  Admit Date: 2018 10:33 PM  Procedure:  Procedure(s) and Anesthesia Type:     * MULTIPLE CORONARY ARTERY BYPASS ENDO VEIN HARVEST - X4, BILATERAL INTERNAL MAMMARY ARTERY HARVEST - General     * [place] - General  5 Day Post-Op    Vitals:  Patient Vitals for the past 8 hrs:   Temp SpO2 O2 Delivery O2 (LPM) Pulse Heart Rate (Monitored) Resp NIBP Weight   18 0800 - 94 % Silicone Nasal Cannula 2.5 98 98 (!) 98 127/95 -   18 0400 36.3 °C (97.3 °F) 98 % - 2.5 85 - - 145/95 116.4 kg (256 lb 9.9 oz)     Temp (24hrs), Av.2 °C (97.1 °F), Min:35.8 °C (96.5 °F), Max:36.3 °C (97.3 °F)      Respiratory:    Respiration: (!) 98, Pulse Oximetry: 94 %, O2 Daily Delivery Respiratory : Silicone Nasal Cannula     Chest Tube Drains:          Fluids:    Intake/Output Summary (Last 24 hours) at 18 0821  Last data filed at 18 0800   Gross per 24 hour   Intake           642.15 ml   Output             3700 ml   Net         -3057.85 ml     Admit weight: Weight: 108.9 kg (240 lb)  Current weight: Weight: 116.4 kg (256 lb 9.9 oz) (18 0400)    Labs:  Recent Labs      18   0415  18   0450   WBC  15.8*  16.1*  10.8   RBC  3.73*  3.99*  4.25*   HEMOGLOBIN  11.3*  12.2*  13.1*   HEMATOCRIT  34.5*  36.1*  37.7*   MCV  92.5  90.5  88.7   MCH  30.3  30.6  30.8   MCHC  32.8*  33.8  34.7   RDW  45.4  43.0  42.2   PLATELETCT  143*  202  213   MPV  10.3  10.3  10.0     Recent Labs      1818  18   0415  18   0450   NEUTSPOLYS  83.20*  79.30*  66.40   LYMPHOCYTES  5.30*  6.70*  9.60*   MONOCYTES  10.30  12.80  21.90*   EOSINOPHILS  0.10  0.20  0.70   BASOPHILS  0.30  0.40  0.40     Recent Labs      18   0518  18   0415  18   0450   SODIUM  132*  132*  134*   POTASSIUM  4.3  3.9  3.7   CHLORIDE  98  93*  93*   CO2  30  31  33   GLUCOSE  125*  117*  120*   BUN  30*  33*   30*   CREATININE  0.96  1.05  1.06   CALCIUM  8.6  8.7  8.5           Medications:  • metoprolol  37.5 mg     • metoclopramide  10 mg     • baclofen  10 mg     • furosemide  40 mg     • potassium chloride SA  20 mEq     • amiodarone  400 mg     • docusate sodium  100 mg      And   • senna-docusate  1 Tab     • enoxaparin  40 mg     • aspirin EC  81 mg     • clopidogrel  75 mg     • losartan  50 mg     • rosuvastatin  20 mg         Exam:   Review of Systems   Constitutional: Positive for malaise/fatigue.   HENT: Negative.    Eyes: Negative.    Respiratory: Negative.    Cardiovascular: Chest pain: MSK.   Gastrointestinal: Positive for abdominal pain and diarrhea. Negative for constipation.   Genitourinary: Negative.    Musculoskeletal: Negative.    Skin: Negative.    Neurological: Negative.    Endo/Heme/Allergies: Negative.    Psychiatric/Behavioral: Negative.        Physical Exam   Constitutional: He is oriented to person, place, and time. He appears well-developed and well-nourished.   HENT:   Head: Normocephalic.   Eyes: Pupils are equal, round, and reactive to light.   Neck: Normal range of motion. No JVD present.   Cardiovascular: Normal rate, regular rhythm and normal heart sounds.    Pulmonary/Chest: Effort normal and breath sounds normal.   Dim bases, shallow breaths   Abdominal: He exhibits distension. Bowel sounds are increased. There is tenderness in the right upper quadrant. There is no guarding.   Musculoskeletal: Normal range of motion. He exhibits edema.   Neurological: He is alert and oriented to person, place, and time.   Skin: Skin is warm and dry.   prevena to chest, EVH sites CDI   Psychiatric: He has a normal mood and affect. His behavior is normal. Judgment and thought content normal.       Quality Measures:   Quality-Core Measures   Bravo catheter::  No Bravo  Central line in place:  Need for access  DVT prophylaxis pharmacological::  Contraindicated - High bleeding risk  DVT prophylaxis -  mechanical:  SCDs  Ulcer Prophylaxis::  Yes  Assessed for rehabilitation services:  Patient returned to prior level of function, rehabilitation not indicated at this time    Assessment/Plan:  POD 1 HDS on low dose epi.  NSR. Vented, ABGs good, weaning to extubated this AM. CT output min, no airlekas.  Will keep CTs/gill today.  Plan to start diuresis once BP stable off epi.  Continuing ABX x 72 hours for bilateral ELIANE harvests.  CPM.  POD 2 HDS< NSR (afib with RVR this AM, converted on amio gtt protocol). Pain issues, shallow breathing.  CT output min, no airleak.  Passing gas, no BM.  PLAN:  Continue amio protocol. Add PO dilaudid.  Stress IS use.  DC mediastinal CTs and gill.  Diurese.   POD 3 HDS, Afib for 40 minutes this am- now SR- on amio gtt, CT bilat pleural tubes- 50/70 cc, sedate- dc ms contin, inc diuresis, amb, enc IS  POD 4 HDS, NSR on amio, min output blakes.  ABD distention, hyper/distant bowel sounds, RUQ pain.  States passing gas, but no BM.  PLAN:  DC blakes.  Continue diuresis.  ABD xray now.  Add reglan. Limit narcotics. AMB/IS.  POD 5 HDS/HTN, NSR/ST, short bout of afib again with ambulation rates to 140s, currently NSR on amio gtt.  States feels a little better this AM.  Had large loose/watery stool this AM.  ABD remains very distended, hypobowel sounds.  PLAN:  Keep NPO, gen surg following.  Continue diuresis, decrease amount since NPO.  Decrease amio gtt to 0.5 mg/min.  Increase BB. AMB/IS.      Active Hospital Problems    Diagnosis   • STEMI (ST elevation myocardial infarction) (HCC) [I21.3]     Priority: High   • HTN (hypertension) [I10]

## 2018-05-02 NOTE — PROGRESS NOTES
"S:  48 y.o.male s/p coronary artery bypass with postoperative ileus. Overnight patient had bowel movements and had no further episodes of vomiting.   O:  Blood pressure 130/103, pulse 98, temperature 36.3 °C (97.3 °F), resp. rate (!) 98, height 1.854 m (6' 1\"), weight 116.4 kg (256 lb 9.9 oz), SpO2 94 %.  I/O last 3 completed shifts:  In: 1090.2 [P.O.:600; I.V.:490.2]  Out: 5340 [Urine:5250]  Recent Labs      04/30/18 0518 05/01/18 0415  05/02/18   0450   SODIUM  132*  132*  134*   POTASSIUM  4.3  3.9  3.7   CHLORIDE  98  93*  93*   CO2  30  31  33   GLUCOSE  125*  117*  120*   BUN  30*  33*  30*   CREATININE  0.96  1.05  1.06   CALCIUM  8.6  8.7  8.5     Recent Labs      04/30/18 0518 05/01/18 0415 05/02/18   0450   WBC  15.8*  16.1*  10.8   RBC  3.73*  3.99*  4.25*   HEMOGLOBIN  11.3*  12.2*  13.1*   HEMATOCRIT  34.5*  36.1*  37.7*   MCV  92.5  90.5  88.7   MCH  30.3  30.6  30.8   MCHC  32.8*  33.8  34.7   RDW  45.4  43.0  42.2   PLATELETCT  143*  202  213   MPV  10.3  10.3  10.0       Alert and Oriented x3, No Acute Distress  Normal Respiratory Effort  Abdomen softer, nontender still with some distention and tympany  Incisions/Bandages clean/dry/intact  Extremities warm and well perfused      A/P: Postoperative ileus is improving   Pain control with minimal narcotics  Replace potassium  Leukocytosis has resolved  Diet will continue with only meds with sips and we'll reassess this afternoon and has no further nausea and continued bowel function will consider advancing diet to clears   Ambulate tid and ad mishel  Stuart Sanabria MD PhD  [facility]  Colon and Rectal Surgeon  [phone]    "

## 2018-05-02 NOTE — DISCHARGE PLANNING
Medical SW    Referral: Sw met w/ pt at bedside.    Pt states his mom went to get breakfast. Sw introduced self and indicated if mom or pt has any questions regarding d/c planning, they can contact Jenae who's office is on the [facility] unit. Pt indicates he will be d/cing home to [place] when he is medically released. He is aware his mom has made appointments w/ both PCP and Cardiologist in [place] for his return home.     Plan: Sw to assist w/ d/c planning as needed.

## 2018-05-02 NOTE — CARE PLAN
Problem: Nutritional:  Goal: Achieve adequate nutritional intake  Patient will consume >50% of meals   Outcome: NOT MET  Admit day 7. +Post-op ileus.   Pt is NPO. OK per Surgeon to take PO meds and will re-assess for possible clear liquid diet later today.    RD following.

## 2018-05-02 NOTE — PROGRESS NOTES
Pulmonary Critical Care Progress Note      Date of Admission:  4/25/2018  Date of Service:  5/2/2018  Chief Complaint:  Postoperative ventilator management for 4 V CABG    Interval Events:  24 hour interval history reviewed   - POD#5 for 4V CABG   - a/ox4   - flat affect   - SR/a-fib 80-90s to 100-120s   - amio boluses   - amio at 0.5   - NPO for ileus and ok for meds   - -140s   - loose watery BM this morning   - IS at 1750cc   - no CXR today   - abx finished yesterday   - lovenox    Yesterday's Events:   - POD#4 for 4V CABG   - no events overnight   - a/ox4   - fatigued this morning with flat affect   - SR 70-80s   - SBP 120s   - amio at 0.5   - afebrile   - cardiac diet   - very small BMs and uncomfortable    - distended belly/firm   - UOP 1000cc with urinal   - right IJ TLC   - chest tubes removed today   - CXR(reviewed 4/30): hypoinlfated, no infiltrates    Review of Systems   Constitutional: Negative for chills and fever.   HENT: Negative for congestion, ear pain, hearing loss and sore throat.    Eyes: Negative for blurred vision, double vision and pain.   Respiratory: Negative for cough and shortness of breath.    Cardiovascular: Positive for chest pain. Negative for palpitations and leg swelling.   Gastrointestinal: Positive for constipation. Negative for abdominal pain, nausea and vomiting.        Feeling very bloated   Genitourinary: Negative for dysuria, frequency and urgency.   Musculoskeletal: Negative for back pain and neck pain.   Skin: Negative.    Neurological: Positive for weakness. Negative for tremors, sensory change, speech change, focal weakness and headaches.   Endo/Heme/Allergies: Negative.    Psychiatric/Behavioral: Negative for depression. The patient has insomnia. The patient is not nervous/anxious.        Physical Exam   Constitutional: He is oriented to person, place, and time. He appears distressed.   Appears in discomfort from abdominal bloating     HENT:   Right Ear: External  ear normal.   Left Ear: External ear normal.   Nose: Nose normal.   Mouth/Throat: No oropharyngeal exudate.   Eyes: Conjunctivae and EOM are normal. Pupils are equal, round, and reactive to light. No scleral icterus.   Neck: Normal range of motion. Neck supple. No thyromegaly present.   Cardiovascular: Normal rate, regular rhythm, normal heart sounds and intact distal pulses.    No murmur heard.  Pulmonary/Chest: Breath sounds normal. No stridor. No respiratory distress. He exhibits no tenderness.   Abdominal: He exhibits distension. He exhibits no mass. There is no tenderness. There is no rebound and no guarding.   Hypoactive bowel sounds and firm   Musculoskeletal: Normal range of motion. He exhibits no edema, tenderness or deformity.   Lymphadenopathy:     He has no cervical adenopathy.   Neurological: He is alert and oriented to person, place, and time. He has normal reflexes. No cranial nerve deficit. Gait normal.   Skin: Skin is dry. No rash noted. He is not diaphoretic.   Psychiatric: Mood, memory and judgment normal.   Flat affect   Nursing note and vitals reviewed.  exam unchanged except for abdomen    PFSH:  No change.    Respiratory:     Pulse Oximetry: 98 %    HemoDynamics:  Pulse: 85, Heart Rate (Monitored): 96  Blood Pressure: 130/103, NIBP: 145/95        Fluids:  Intake/Output       04/30/18 0700 - 05/01/18 0659 05/01/18 0700 - 05/02/18 0659 05/02/18 0700 - 05/03/18 0659      [id number] 6069-5614 Total 0960-1939 7604-7573 Total 8545-3406 6565-8273 Total       Intake    P.O.  180  600 780  --  -- --  --  -- --    P.O. 180 600 780 -- -- -- -- -- --    I.V.  271.8  34 305.8  5.8  384.4 390.2  --  -- --    Amiodarone Volume 271.8 34 305.8 5.8 384.4 390.2 -- -- --    Total Intake 451.8 634 1085.8 5.8 384.4 390.2 -- -- --       Output    Urine  1350  1100 2450  2100  2050 4150  --  -- --    Number of Times Voided 4 x -- 4 x -- -- -- -- -- --    Urine Void (mL) (non-catheter) 1350 1100 2450 2100 2050 4150 --  -- --    Stool  --  -- --  --  -- --  --  -- --    Number of Times Stooled 0 x -- 0 x 2 x -- 2 x -- -- --    Chest Tube  60  90 150  --  -- --  --  -- --    Output (mL) ([REMOVED] Chest Tube Group 1 (A) Mediastinal round-Kevin 24) 20 50 70 -- -- -- -- -- --    Output (mL) ([REMOVED] Chest Tube Group 2 (B) Mediastinal round-Kevin 24) 40 40 80 -- -- -- -- -- --    Total Output 1410 1190 2600 2100 2050 4150 -- -- --       Net I/O     -958.2 -556 -1514.2 -2094.3 -1665.6 -3759.9 -- -- --        Weight: 118.3 kg (260 lb 12.9 oz)  Recent Labs      18   SODIUM  132*  132*   POTASSIUM  4.3  3.9   CHLORIDE  98  93*   CO2  30  31   BUN  30*  33*   CREATININE  0.96  1.05   CALCIUM  8.6  8.7       GI/Nutrition:    Liver Function  Recent Labs      18   ALTSGPT  43  47   ASTSGOT  65*  49*   ALKPHOSPHAT  56  85   TBILIRUBIN  2.4*  2.2*   GLUCOSE  125*  117*       Heme:  Recent Labs      18   045   RBC  3.73*  3.99*  4.25*   HEMOGLOBIN  11.3*  12.2*  13.1*   HEMATOCRIT  34.5*  36.1*  37.7*   PLATELETCT  143*  202  213       Infectious Disease:  Temp  Av.2 °C (97.2 °F)  Min: 35.8 °C (96.5 °F)  Max: 36.3 °C (97.4 °F)    Recent Labs      18   0450   WBC  15.8*  16.1*  10.8   NEUTSPOLYS  83.20*  79.30*  66.40   LYMPHOCYTES  5.30*  6.70*  9.60*   MONOCYTES  10.30  12.80  21.90*   EOSINOPHILS  0.10  0.20  0.70   BASOPHILS  0.30  0.40  0.40   ASTSGOT  65*  49*   --    ALTSGPT  43  47   --    ALKPHOSPHAT  56  85   --    TBILIRUBIN  2.4*  2.2*   --      Current Facility-Administered Medications   Medication Dose Frequency Provider Last Rate Last Dose   • metoclopramide (REGLAN) injection 10 mg  10 mg Q6HRS Alisson Escobar, A.P.N.   10 mg at 18 0008   • baclofen (LIORESAL) tablet 10 mg  10 mg TID Alisson Escobar, A.P.N.   10 mg at 18   • furosemide (LASIX) injection 40 mg  40 mg  BID DIURETIC Theresa Reddy A.P.N.   40 mg at 05/01/18 1801   • potassium chloride SA (Kdur) tablet 20 mEq  20 mEq BID Theresa Reddy A.P.N.   20 mEq at 05/01/18 2117   • amiodarone (CORDARONE) 450 mg in D5W 250 mL Infusion  1 mg/min Continuous Alisson Escobar, A.P.N. 33 mL/hr at 05/02/18 0135 1 mg/min at 05/02/18 0135   • amiodarone (CORDARONE) tablet 400 mg  400 mg BID Alisson Escobar, A.P.N.   400 mg at 05/01/18 1801   • ipratropium-albuterol (DUONEB) nebulizer solution 3 mL  3 mL Q4H PRN (RT) Andres Reeves M.D.   3 mL at 04/28/18 2141   • Respiratory Care per Protocol   Continuous RT Alisson Escobar, A.P.N.       • NS infusion   Continuous Alisson Escobar, A.P.N.   Stopped at 05/01/18 2122   • Pharmacy Consult Request ...Pain Management Review 1 Each  1 Each PRN Alisson Escobar, A.P.N.       • docusate sodium (COLACE) capsule 100 mg  100 mg QAM Alisson Escobar, A.P.N.   100 mg at 05/01/18 0822    And   • senna-docusate (PERICOLACE or SENOKOT S) 8.6-50 MG per tablet 1 Tab  1 Tab Nightly Alisson Escobar, A.P.N.   1 Tab at 05/01/18 2117    And   • senna-docusate (PERICOLACE or SENOKOT S) 8.6-50 MG per tablet 1 Tab  1 Tab Q24HRS PRN Alisson Escobar, A.P.N.   1 Tab at 04/30/18 1032    And   • lactulose 20 GM/30ML solution 30 mL  30 mL Q24HRS PRN Alisson Escobar, A.P.N.   30 mL at 04/30/18 1955    And   • bisacodyl (DULCOLAX) suppository 10 mg  10 mg Q24HRS PRN Alisson Escobar, A.P.N.        And   • fleet enema 133 mL  1 Each Once PRN Alisson Escobar, A.P.N.       • enoxaparin (LOVENOX) inj 40 mg  40 mg DAILY Alisson Escobar, A.P.N.   40 mg at 05/01/18 0822   • metoprolol (LOPRESSOR) tablet 25 mg  25 mg BID Alisson Escobar, A.P.N.   25 mg at 05/01/18 2117   • aspirin EC (ECOTRIN) tablet 81 mg  81 mg DAILY Alisson Escobar, A.P.N.   81 mg at 05/01/18 0821   • clopidogrel (PLAVIX) tablet 75 mg  75 mg DAILY Alisson Escobar, A.P.N.   75 mg at 05/01/18 0822   • oxyCODONE immediate-release  (ROXICODONE) tablet 5 mg  5 mg Q3HRS PRN Alisson Escobar, A.P.N.   5 mg at 05/01/18 0821   • oxyCODONE immediate release (ROXICODONE) tablet 10 mg  10 mg Q3HRS PRN Alisson Escobar, A.P.N.   10 mg at 05/01/18 1801   • tramadol (ULTRAM) 50 MG tablet 50 mg  50 mg Q4HRS PRN Alisson Escobar, A.P.N.   50 mg at 05/02/18 0147   • ondansetron (ZOFRAN) syringe/vial injection 4 mg  4 mg Q6HRS PRN Alisson Escobar, A.P.N.        Or   • prochlorperazine (COMPAZINE) injection 10 mg  10 mg Q6HRS PRN Alisson Escobar, A.P.N.        Or   • promethazine (PHENERGAN) suppository 25 mg  25 mg Q6HRS PRN Alisson Escobar, A.P.N.       • acetaminophen (TYLENOL) tablet 650 mg  650 mg Q4HRS PRN Alisson Escobar, A.P.N.   650 mg at 04/30/18 1910    Or   • acetaminophen (TYLENOL) suppository 650 mg  650 mg Q4HRS PRN Alisson Montanat, A.P.N.       • mag hydrox-al hydrox-simeth (MAALOX PLUS ES or MYLANTA DS) suspension 30 mL  30 mL Q4HRS PRN Alisson Escobar, A.P.N.   30 mL at 04/30/18 1925   • diphenhydrAMINE (BENADRYL) tablet/capsule 25 mg  25 mg HS PRN - MR X 1 Alisson Montanat, A.P.N.   25 mg at 04/29/18 0035   • dextrose 50% (D50W) injection 25 mL  25 mL PRN Alisson Montanat, A.P.N.       • losartan (COZAAR) tablet 50 mg  50 mg DAILY Adelina Vieyra M.D.   50 mg at 05/01/18 0821   • rosuvastatin (CRESTOR) tablet 20 mg  20 mg Q EVENING Leighton Cook M.D.   20 mg at 05/01/18 2116     Last reviewed on 4/28/2018  8:25 PM by Iain An R.N.    Quality  Measures:  Labs reviewed, Medications reviewed and Radiology images reviewed  Bravo catheter: No Bravo  Central line in place: Need for access    DVT Prophylaxis: Enoxaparin (Lovenox)  DVT prophylaxis - mechanical: SCDs  Ulcer prophylaxis: Not indicated          Assessment and Plan:    Postoperative ventilator management   - extubated 4/28   - IS/PEP, mobilize   - cont RT protocols   - start forced diuresis  Status post 4-vessel coronary bypass grafting - 4/27   - aspirin, Plavix, statin,  BB   - CVS protocols  Status post inferior STEMI   - Status post thrombolytic therapy at outside facility   - Status post emergent angioplasty of subtotally occluded mid circumflex coronary artery   - meds as above  Acute Ileus   - gi consulted   - NPO except for sips of water   - check Mg level   - cont reglan IV   - cont stool softeners  Acute systolic heart failure   - follow I/O closely   - cont lasix  History of hypertension   - start metoprolol  Hypokalemia   - Replete potassium  Hiccups   - cont reglan IV and baclofen orally  AF/RVR   - controlled and now in SR   - restarted amiodarone infusion   - cont oral amiodarone   - start oral metoprolol    Will continue to follow in the ICU  [id number]

## 2018-05-02 NOTE — CARE PLAN
Problem: Post Op Day 4 CABG/Heart Valve Replacement  Goal: Optimal care of the Post Op CABG/Heart Valve replacement Post Op Day 4  Outcome: PROGRESSING AS EXPECTED    Intervention: Daily Weights  Done   Intervention: Up in chair for all meals  Up to chair in AM. Pt NPO at this time.  Intervention: Ambulate, increasing the distance each time x 3 and before bed  Done   Intervention: IS q 1 hour while awake and record best IS volume  IS encouraged  Intervention: Consider removal of gill, chest tube and pacer wire if not already done  Done

## 2018-05-02 NOTE — DISCHARGE PLANNING
Medical SW    Sw attended AM IDT Rounds.    RN reports, pt PO day 5, A/O x4, w/ flat affect,       Plan: Sw to assist w/ d/c planning as needed.

## 2018-05-02 NOTE — CONSULTS
Surgical History and Physical    Date: 5/1/2018    Requesting Physician: Dr. Laguerre    Consulting Physician: Stuart Sanabria    Reason for consultation: postoperative ileus versus small bowel obstruction    CC: abdominal distention with nausea and vomiting    HPI: This is a 48 y.o. male who is presenting vague abdominal pain with one episode of nausea and vomiting.  No prior history of abdominal surgery he does have recent coronary artery bypass graft performed this admission.  No prior history of radiation exposure.  No prior history of inflammatory bowel disease including Crohn's or Ulcerative Colitis.  The patient's last meal was yesterday.  The patient's last bowel movement was today and was described as small and hard.  The patient denies any history of foreign body ingestion.    Past Medical History:   Diagnosis Date   • Hypertension        Past Surgical History:   Procedure Laterality Date   • MULTIPLE CORONARY ARTERY BYPASS ENDO VEIN HARVEST  4/27/2018    Procedure: MULTIPLE CORONARY ARTERY BYPASS ENDO VEIN HARVEST - X4, BILATERAL INTERNAL MAMMARY ARTERY HARVEST;  Surgeon: Donald Pike M.D.;  Location: SURGERY [facility];  Service: Cardiac   • LIMA  4/27/2018    Procedure: LIMA;  Surgeon: Donald Pike M.D.;  Location: SURGERY [facility];  Service: Cardiac   • OTHER ORTHOPEDIC SURGERY         Current Facility-Administered Medications   Medication Dose Route Frequency Provider Last Rate Last Dose   • metoclopramide (REGLAN) injection 10 mg  10 mg Intravenous Q6HRS Alisson Montanat, A.P.N.   10 mg at 05/01/18 1801   • baclofen (LIORESAL) tablet 10 mg  10 mg Oral TID Alisson Montanat, A.P.N.   10 mg at 05/01/18 1414   • amiodarone (CORDARONE) 150 mg in D5W 100 mL IVPB  150 mg Intravenous Once Alisson Montanat, A.P.N.       • furosemide (LASIX) injection 40 mg  40 mg Intravenous BID DIURETIC Theresa Reddy, A.P.N.   40 mg at 05/01/18 1801   • potassium chloride SA (Kdur) tablet 20 mEq  20 mEq  Oral BID Theresa Reddy A.P.N.   20 mEq at 05/01/18 0823   • amiodarone (CORDARONE) 450 mg in D5W 250 mL Infusion  1 mg/min Intravenous Continuous Alisson Escobar, A.P.N. 33 mL/hr at 05/01/18 2051 1 mg/min at 05/01/18 2051   • amiodarone (CORDARONE) tablet 400 mg  400 mg Oral BID Alisson Escobar, A.P.N.   400 mg at 05/01/18 1801   • ipratropium-albuterol (DUONEB) nebulizer solution 3 mL  3 mL Nebulization Q4H PRN (RT) Andres Reeves M.D.   3 mL at 04/28/18 2141   • Respiratory Care per Protocol   Nebulization Continuous RT Alisson Escobar, A.P.N.       • NS infusion   Intravenous Continuous Alisson Escobar, A.P.N. 10 mL/hr at 05/01/18 1918     • Pharmacy Consult Request ...Pain Management Review 1 Each  1 Each Other PRN Alisson Escobar, A.P.N.       • docusate sodium (COLACE) capsule 100 mg  100 mg Oral QAM Alisson Escobar, A.P.N.   100 mg at 05/01/18 0822    And   • senna-docusate (PERICOLACE or SENOKOT S) 8.6-50 MG per tablet 1 Tab  1 Tab Oral Nightly Alisson Escobar, A.P.N.   1 Tab at 04/30/18 2009    And   • senna-docusate (PERICOLACE or SENOKOT S) 8.6-50 MG per tablet 1 Tab  1 Tab Oral Q24HRS PRN Alisson Escobar, A.P.N.   1 Tab at 04/30/18 1032    And   • lactulose 20 GM/30ML solution 30 mL  30 mL Oral Q24HRS PRN Alisson Escobar, A.P.N.   30 mL at 04/30/18 1955    And   • bisacodyl (DULCOLAX) suppository 10 mg  10 mg Rectal Q24HRS PRN Alisson Escobar, A.P.N.        And   • fleet enema 133 mL  1 Each Rectal Once PRN Alisson Escobar, A.P.N.       • enoxaparin (LOVENOX) inj 40 mg  40 mg Subcutaneous DAILY Alisson Escobar, A.P.N.   40 mg at 05/01/18 0822   • mupirocin (BACTROBAN) 2 % ointment 1 Application  1 Application Topical BID Alisson Escobar, A.P.N.   1 Application at 05/01/18 0823   • metoprolol (LOPRESSOR) tablet 25 mg  25 mg Oral BID Alisson Escobar, A.P.N.   25 mg at 05/01/18 0900   • aspirin EC (ECOTRIN) tablet 81 mg  81 mg Oral DAILY Alisson Escobar, A.P.N.   81 mg at 05/01/18  0821   • clopidogrel (PLAVIX) tablet 75 mg  75 mg Oral DAILY Alisson Escobar, A.P.N.   75 mg at 05/01/18 0822   • oxyCODONE immediate-release (ROXICODONE) tablet 5 mg  5 mg Oral Q3HRS PRN Alisson Escobar, A.P.N.   5 mg at 05/01/18 0821   • oxyCODONE immediate release (ROXICODONE) tablet 10 mg  10 mg Oral Q3HRS PRN Alisson Escobar, A.P.N.   10 mg at 05/01/18 1801   • tramadol (ULTRAM) 50 MG tablet 50 mg  50 mg Oral Q4HRS PRN Alisson Escobar, A.P.N.   50 mg at 05/01/18 1024   • ondansetron (ZOFRAN) syringe/vial injection 4 mg  4 mg Intravenous Q6HRS PRN Alisson Escobar, A.P.N.        Or   • prochlorperazine (COMPAZINE) injection 10 mg  10 mg Intravenous Q6HRS PRN Alisson Escobar, A.P.N.        Or   • promethazine (PHENERGAN) suppository 25 mg  25 mg Rectal Q6HRS PRN Alisson Escobar, A.P.N.       • acetaminophen (TYLENOL) tablet 650 mg  650 mg Oral Q4HRS PRN Alisson Escobar, A.P.N.   650 mg at 04/30/18 1910    Or   • acetaminophen (TYLENOL) suppository 650 mg  650 mg Rectal Q4HRS PRN Alisson Escobar, A.P.N.       • mag hydrox-al hydrox-simeth (MAALOX PLUS ES or MYLANTA DS) suspension 30 mL  30 mL Oral Q4HRS PRN Alisson Escobar, A.P.N.   30 mL at 04/30/18 1925   • diphenhydrAMINE (BENADRYL) tablet/capsule 25 mg  25 mg Oral HS PRN - MR X 1 Alisson Escobar, A.P.N.   25 mg at 04/29/18 0035   • dextrose 50% (D50W) injection 25 mL  25 mL Intravenous PRN Alisson Escobar, A.P.N.       • losartan (COZAAR) tablet 50 mg  50 mg Oral DAILY Adelina Vieyra M.D.   50 mg at 05/01/18 0821   • rosuvastatin (CRESTOR) tablet 20 mg  20 mg Oral Q EVENING Leighton Cook M.D.   20 mg at 04/30/18 2009       Social History     Social History   • Marital status:      Spouse name: N/A   • Number of children: N/A   • Years of education: N/A     Occupational History   • Not on file.     Social History Main Topics   • Smoking status: Never Smoker   • Smokeless tobacco: Never Used   • Alcohol use No   • Drug use: No   • Sexual  "activity: Not on file     Other Topics Concern   • Not on file     Social History Narrative   • No narrative on file       Family History   Problem Relation Age of Onset   • Heart Disease Father    • Hypertension Father    • Heart Disease Maternal Grandmother    • Hypertension Maternal Grandmother    • Heart Disease Paternal Grandfather    • Hypertension Paternal Grandfather        Allergies:  Patient has no known allergies.    Review of Systems:  Constitutional: Negative for fever, chills or weight loss  HENT: Negative for nosebleeds   Eyes: Negative for changes in vision or photophobia  Respiratory: Negative for cough, shortness of breath or wheezing  Cardiovascular: Negative for chest pain or palpitations  Gastrointestinal: Negative for nausea, vomiting, diarrhea, blood in stool and melena.   Genitourinary: Negative for dysuria or urinary incontinence   Musculoskeletal: Negative for back pain and joint pain.   Skin: Negative for itching and rash.  Neurological: Negative for dizziness, lightheadedness or loss of consciousness  Endo/Heme/Allergies: Does not bruise/bleed easily.   Psychiatric/Behavioral: Negative for substance abuse. The patient is not nervous/anxious and does not have insomnia.    Physical Exam:  Blood pressure 130/103, pulse 95, temperature 36.3 °C (97.3 °F), resp. rate (!) 25, height 1.854 m (6' 1\"), weight 118.3 kg (260 lb 12.9 oz), SpO2 96 %.    Constitutional: oriented to person, place, and time.  appears well-developed and well-nourished. No distress.   Head: Normocephalic and atraumatic.   Neck: Normal range of motion. Neck supple. No JVD present. No tracheal deviation present. Cardiovascular: Regular rate and rhythm  Pulmonary/Chest: on nasal cannula oxygen with normal respiratory effort.  His wound VAC in place in the sternal incision.  Abdominal: No Evidence of abdominal wall or groin hernia. Distended and tympanic without rebound or guarding.  Musculoskeletal: Normal range of motion.  " exhibits no edema and no tenderness.   Neurological: he is alert and oriented to person, place, and time. Coordination normal.   Skin: Skin is warm and dry. No rash noted. he is not diaphoretic. No erythema. No pallor.   Psychiatric: normal mood and affect.  Behavior is normal.       Labs:  Recent Labs      04/29/18 0417 04/30/18 0518 05/01/18 0415   WBC  13.9*  15.8*  16.1*   RBC  3.78*  3.73*  3.99*   HEMOGLOBIN  11.6*  11.3*  12.2*   HEMATOCRIT  34.2*  34.5*  36.1*   MCV  90.5  92.5  90.5   MCH  30.7  30.3  30.6   MCHC  33.9  32.8*  33.8   RDW  43.8  45.4  43.0   PLATELETCT  102*  143*  202   MPV  10.4  10.3  10.3     Recent Labs      04/29/18 0417 04/30/18 0518 05/01/18   0415   SODIUM  135  132*  132*   POTASSIUM  4.6  4.3  3.9   CHLORIDE  102  98  93*   CO2  28  30  31   GLUCOSE  113*  125*  117*   BUN  24*  30*  33*   CREATININE  1.08  0.96  1.05   CALCIUM  8.3*  8.6  8.7         Recent Labs      04/29/18 0417 04/30/18 0518 05/01/18   0415   ASTSGOT  93*  65*  49*   ALTSGPT  45  43  47   TBILIRUBIN  3.5*  2.4*  2.2*   ALKPHOSPHAT  50  56  85   GLOBULIN  2.5  2.8  3.0         Radiology:  [id number]   Final Result      Dilated small bowel and colon most suggestive of ileus.      DX-CHEST-2 VIEWS   Final Result      1.  Increasing pulmonary vascular congestion and perihilar interstitial markings suggests worsening edema.      2.  Mild right perihilar atelectasis.      3.  Stable cardiomegaly.      4.  Bibasilar opacities and probable small pleural effusions do not appear significantly changed.      DX-CHEST-LIMITED (1 VIEW)   Final Result      No significant change from prior exam.      DX-CHEST-PORTABLE (1 VIEW)   Final Result      1.  Stable bibasilar atelectasis and small bilateral pleural effusions.      2.  Stable cardiomegaly.      DX-CHEST-PORTABLE (1 VIEW)   Final Result      No significant change from prior exam.      DX-CHEST-PORTABLE (1 VIEW)   Final Result         1.   Postsurgical changes are noted with line and tube placements which appear unchanged.      2.  Cardiomegaly and perihilar opacifications are again noted.      3.  No new infiltrates or consolidations are identified.      TRANSESOPHAGEAL ECHO W/O CONT         DX-CHEST-PORTABLE (1 VIEW)   Final Result         1. Expected postsurgical change in the chest.      Carotid Duplex STAT   Final Result      DX-CHEST-2 VIEWS   Final Result      Enlarged cardiac silhouette with mild vascular congestion without overt pulmonary edema.      Bilateral Saphenous Vein Mapping (LE Vein Mapping) STAT- Map whole leg   Final Result      US-LIVER AND BILIARY TREE   Final Result      Hepatomegaly.      No biliary duct dilatation status post cholecystectomy.      Echocardiogram Comp W/O Cont   Final Result      DX-CHEST-LIMITED (1 VIEW)   Final Result         Low lung volume. Diffuse interstitial prominence could relate to mild edema.      Mild cardiomegaly.      ECHOCARDIOGRAM COMP W/O CONT    (Results Pending)       Assessment: This is a 48 y.o.     Active Hospital Problems    Diagnosis   • STEMI (ST elevation myocardial infarction) (HCC) [I21.3]     Priority: High   • HTN (hypertension) [I10]       Plan: postoperative ileus  1) IVF/NPO/NGT decompression if the patient has any more vomiting  2) Maintain K+>4  3) Minimize exposure to narcotics  4)  No plans for surgical intervention at this point. We will continue to monitor.  Lactic acid was normal and he has no leukocytosis and his exam is relatively benign we'll continue to monitor closely for ischemic bowel.  Stuart Sanabria MD PhD  [facility]  Colon and Rectal Surgeon  [phone]

## 2018-05-03 LAB
ALBUMIN SERPL BCP-MCNC: 3 G/DL (ref 3.2–4.9)
ALBUMIN/GLOB SERPL: 1 G/DL
ALP SERPL-CCNC: 68 U/L (ref 30–99)
ALT SERPL-CCNC: 37 U/L (ref 2–50)
ANION GAP SERPL CALC-SCNC: 8 MMOL/L (ref 0–11.9)
AST SERPL-CCNC: 28 U/L (ref 12–45)
BACTERIA BLD CULT: NORMAL
BACTERIA BLD CULT: NORMAL
BASOPHILS # BLD AUTO: 0.5 % (ref 0–1.8)
BASOPHILS # BLD: 0.05 K/UL (ref 0–0.12)
BILIRUB SERPL-MCNC: 1.7 MG/DL (ref 0.1–1.5)
BUN SERPL-MCNC: 26 MG/DL (ref 8–22)
CALCIUM SERPL-MCNC: 8.2 MG/DL (ref 8.5–10.5)
CHLORIDE SERPL-SCNC: 96 MMOL/L (ref 96–112)
CO2 SERPL-SCNC: 33 MMOL/L (ref 20–33)
CREAT SERPL-MCNC: 1.08 MG/DL (ref 0.5–1.4)
EOSINOPHIL # BLD AUTO: 0.38 K/UL (ref 0–0.51)
EOSINOPHIL NFR BLD: 4.1 % (ref 0–6.9)
ERYTHROCYTE [DISTWIDTH] IN BLOOD BY AUTOMATED COUNT: 42.4 FL (ref 35.9–50)
GLOBULIN SER CALC-MCNC: 2.9 G/DL (ref 1.9–3.5)
GLUCOSE SERPL-MCNC: 122 MG/DL (ref 65–99)
HCT VFR BLD AUTO: 37.6 % (ref 42–52)
HGB BLD-MCNC: 12.8 G/DL (ref 14–18)
IMM GRANULOCYTES # BLD AUTO: 0.1 K/UL (ref 0–0.11)
IMM GRANULOCYTES NFR BLD AUTO: 1.1 % (ref 0–0.9)
LYMPHOCYTES # BLD AUTO: 0.84 K/UL (ref 1–4.8)
LYMPHOCYTES NFR BLD: 9.1 % (ref 22–41)
MAGNESIUM SERPL-MCNC: 2.4 MG/DL (ref 1.5–2.5)
MCH RBC QN AUTO: 30.4 PG (ref 27–33)
MCHC RBC AUTO-ENTMCNC: 34 G/DL (ref 33.7–35.3)
MCV RBC AUTO: 89.3 FL (ref 81.4–97.8)
MONOCYTES # BLD AUTO: 2.35 K/UL (ref 0–0.85)
MONOCYTES NFR BLD AUTO: 25.5 % (ref 0–13.4)
NEUTROPHILS # BLD AUTO: 5.51 K/UL (ref 1.82–7.42)
NEUTROPHILS NFR BLD: 59.7 % (ref 44–72)
NRBC # BLD AUTO: 0 K/UL
NRBC BLD-RTO: 0 /100 WBC
PLATELET # BLD AUTO: 217 K/UL (ref 164–446)
PMV BLD AUTO: 9.9 FL (ref 9–12.9)
POTASSIUM SERPL-SCNC: 3.2 MMOL/L (ref 3.6–5.5)
PROT SERPL-MCNC: 5.9 G/DL (ref 6–8.2)
RBC # BLD AUTO: 4.21 M/UL (ref 4.7–6.1)
SIGNIFICANT IND 70042: NORMAL
SIGNIFICANT IND 70042: NORMAL
SITE SITE: NORMAL
SITE SITE: NORMAL
SODIUM SERPL-SCNC: 137 MMOL/L (ref 135–145)
SOURCE SOURCE: NORMAL
SOURCE SOURCE: NORMAL
WBC # BLD AUTO: 9.2 K/UL (ref 4.8–10.8)

## 2018-05-03 PROCEDURE — A9270 NON-COVERED ITEM OR SERVICE: HCPCS | Performed by: NURSE PRACTITIONER

## 2018-05-03 PROCEDURE — A9270 NON-COVERED ITEM OR SERVICE: HCPCS | Performed by: HOSPITALIST

## 2018-05-03 PROCEDURE — A9270 NON-COVERED ITEM OR SERVICE: HCPCS | Performed by: INTERNAL MEDICINE

## 2018-05-03 PROCEDURE — 700102 HCHG RX REV CODE 250 W/ 637 OVERRIDE(OP): Performed by: HOSPITALIST

## 2018-05-03 PROCEDURE — 83735 ASSAY OF MAGNESIUM: CPT

## 2018-05-03 PROCEDURE — 770020 HCHG ROOM/CARE - TELE (206)

## 2018-05-03 PROCEDURE — 700102 HCHG RX REV CODE 250 W/ 637 OVERRIDE(OP): Performed by: NURSE PRACTITIONER

## 2018-05-03 PROCEDURE — 80053 COMPREHEN METABOLIC PANEL: CPT

## 2018-05-03 PROCEDURE — 700111 HCHG RX REV CODE 636 W/ 250 OVERRIDE (IP): Performed by: NURSE PRACTITIONER

## 2018-05-03 PROCEDURE — 700105 HCHG RX REV CODE 258: Performed by: NURSE PRACTITIONER

## 2018-05-03 PROCEDURE — 700102 HCHG RX REV CODE 250 W/ 637 OVERRIDE(OP): Performed by: INTERNAL MEDICINE

## 2018-05-03 PROCEDURE — 700112 HCHG RX REV CODE 229: Performed by: NURSE PRACTITIONER

## 2018-05-03 PROCEDURE — 85025 COMPLETE CBC W/AUTO DIFF WBC: CPT

## 2018-05-03 RX ORDER — POTASSIUM CHLORIDE 20 MEQ/1
40 TABLET, EXTENDED RELEASE ORAL ONCE
Status: COMPLETED | OUTPATIENT
Start: 2018-05-03 | End: 2018-05-03

## 2018-05-03 RX ORDER — CALCIUM CARBONATE 500 MG/1
500 TABLET, CHEWABLE ORAL EVERY 4 HOURS PRN
Status: DISCONTINUED | OUTPATIENT
Start: 2018-05-03 | End: 2018-05-08 | Stop reason: HOSPADM

## 2018-05-03 RX ORDER — METOPROLOL TARTRATE 50 MG/1
50 TABLET, FILM COATED ORAL 2 TIMES DAILY
Status: DISCONTINUED | OUTPATIENT
Start: 2018-05-03 | End: 2018-05-08 | Stop reason: HOSPADM

## 2018-05-03 RX ADMIN — DIPHENHYDRAMINE HCL 25 MG: 25 TABLET ORAL at 22:43

## 2018-05-03 RX ADMIN — AMIODARONE HYDROCHLORIDE 400 MG: 200 TABLET ORAL at 06:17

## 2018-05-03 RX ADMIN — OXYCODONE HYDROCHLORIDE 5 MG: 5 TABLET ORAL at 19:42

## 2018-05-03 RX ADMIN — DOCUSATE SODIUM 100 MG: 100 CAPSULE ORAL at 08:01

## 2018-05-03 RX ADMIN — ROSUVASTATIN CALCIUM 20 MG: 10 TABLET, FILM COATED ORAL at 19:42

## 2018-05-03 RX ADMIN — BACLOFEN 10 MG: 10 TABLET ORAL at 08:00

## 2018-05-03 RX ADMIN — ASPIRIN 81 MG: 81 TABLET, COATED ORAL at 08:01

## 2018-05-03 RX ADMIN — AMIODARONE HYDROCHLORIDE 0.5 MG/MIN: 50 INJECTION, SOLUTION INTRAVENOUS at 03:45

## 2018-05-03 RX ADMIN — POTASSIUM CHLORIDE 40 MEQ: 1500 TABLET, EXTENDED RELEASE ORAL at 08:51

## 2018-05-03 RX ADMIN — METOPROLOL TARTRATE 50 MG: 50 TABLET, FILM COATED ORAL at 19:42

## 2018-05-03 RX ADMIN — ENOXAPARIN SODIUM 40 MG: 100 INJECTION SUBCUTANEOUS at 08:50

## 2018-05-03 RX ADMIN — ALUMINUM HYDROXIDE, MAGNESIUM HYDROXIDE,SIMETHICONE 30 ML: 400; 400; 40 LIQUID ORAL at 21:19

## 2018-05-03 RX ADMIN — LOSARTAN POTASSIUM 50 MG: 25 TABLET, FILM COATED ORAL at 08:00

## 2018-05-03 RX ADMIN — POTASSIUM CHLORIDE 20 MEQ: 1500 TABLET, EXTENDED RELEASE ORAL at 08:00

## 2018-05-03 RX ADMIN — METOPROLOL TARTRATE 37.5 MG: 25 TABLET, FILM COATED ORAL at 08:00

## 2018-05-03 RX ADMIN — CLOPIDOGREL 75 MG: 75 TABLET, FILM COATED ORAL at 08:00

## 2018-05-03 RX ADMIN — ONDANSETRON 4 MG: 2 INJECTION INTRAMUSCULAR; INTRAVENOUS at 19:53

## 2018-05-03 RX ADMIN — DIPHENHYDRAMINE HCL 25 MG: 25 TABLET ORAL at 21:51

## 2018-05-03 RX ADMIN — FUROSEMIDE 40 MG: 10 INJECTION, SOLUTION INTRAMUSCULAR; INTRAVENOUS at 07:59

## 2018-05-03 RX ADMIN — OXYCODONE HYDROCHLORIDE 5 MG: 5 TABLET ORAL at 01:27

## 2018-05-03 RX ADMIN — AMIODARONE HYDROCHLORIDE 400 MG: 200 TABLET ORAL at 19:42

## 2018-05-03 RX ADMIN — METOCLOPRAMIDE 10 MG: 5 INJECTION, SOLUTION INTRAMUSCULAR; INTRAVENOUS at 06:17

## 2018-05-03 RX ADMIN — METOCLOPRAMIDE 10 MG: 5 INJECTION, SOLUTION INTRAMUSCULAR; INTRAVENOUS at 00:09

## 2018-05-03 RX ADMIN — ANTACID TABLETS 500 MG: 500 TABLET, CHEWABLE ORAL at 23:57

## 2018-05-03 ASSESSMENT — ENCOUNTER SYMPTOMS
SPEECH CHANGE: 0
CHILLS: 0
EYES NEGATIVE: 1
FEVER: 0
COUGH: 0
ABDOMINAL PAIN: 0
NEUROLOGICAL NEGATIVE: 1
CONSTIPATION: 0
DEPRESSION: 0
HEADACHES: 0
WEAKNESS: 1
ABDOMINAL PAIN: 1
PSYCHIATRIC NEGATIVE: 1
NECK PAIN: 0
NERVOUS/ANXIOUS: 0
SENSORY CHANGE: 0
SHORTNESS OF BREATH: 0
RESPIRATORY NEGATIVE: 1
DIARRHEA: 1
DOUBLE VISION: 0
SORE THROAT: 0
PALPITATIONS: 1
MUSCULOSKELETAL NEGATIVE: 1
BLOOD IN STOOL: 0
BACK PAIN: 0
NAUSEA: 0
SPUTUM PRODUCTION: 0
INSOMNIA: 0
VOMITING: 0
BLURRED VISION: 0

## 2018-05-03 ASSESSMENT — PAIN SCALES - GENERAL
PAINLEVEL_OUTOF10: 5
PAINLEVEL_OUTOF10: 3
PAINLEVEL_OUTOF10: 2
PAINLEVEL_OUTOF10: 2
PAINLEVEL_OUTOF10: 5
PAINLEVEL_OUTOF10: 3
PAINLEVEL_OUTOF10: 2
PAINLEVEL_OUTOF10: 2

## 2018-05-03 NOTE — CARE PLAN
Problem: Post Op Day 4 CABG/Heart Valve Replacement  Goal: Optimal care of the Post Op CABG/Heart Valve replacement Post Op Day 4    Intervention: Shower daily and clean incisions twice daily with soap and water  Shower done and incisions cleaned  Intervention: Up in chair for all meals  Done  Intervention: Ambulate, increasing the distance each time x 3 and before bed  Ambulated 3 x, 1 lap max  Intervention: IS q 1 hour while awake and record best IS volume  1500 ml  Intervention: Consider removal of gill, chest tube and pacer wire if not already done  N/A

## 2018-05-03 NOTE — PROGRESS NOTES
Cardiovascular Surgery Progress Note    Name: Jae Venegas  MRN: [id number]  : 1970  Admit Date: 2018 10:33 PM  Procedure:  Procedure(s) and Anesthesia Type:     * MULTIPLE CORONARY ARTERY BYPASS ENDO VEIN HARVEST - X4, BILATERAL INTERNAL MAMMARY ARTERY HARVEST - General     * [place] - General  5 Day Post-Op    Vitals:  Patient Vitals for the past 8 hrs:   Temp SpO2 O2 Delivery O2 (LPM) Pulse Heart Rate (Monitored) Resp BP NIBP Weight   18 0617 - - - - 85 - - 150/94 - -   18 0400 36.2 °C (97.2 °F) 92 % Silicone Nasal Cannula 2.5 87 87 20 - 115/88 -   18 0200 - - - - - - - - - 109.2 kg (240 lb 11.9 oz)     Temp (24hrs), Av.2 °C (97.2 °F), Min:36 °C (96.8 °F), Max:36.3 °C (97.4 °F)      Respiratory:    Respiration: 20, Pulse Oximetry: 92 %, O2 Daily Delivery Respiratory : Silicone Nasal Cannula     Chest Tube Drains:          Fluids:    Intake/Output Summary (Last 24 hours) at 18 0816  Last data filed at 18 0500   Gross per 24 hour   Intake              504 ml   Output             3550 ml   Net            -3046 ml     Admit weight: Weight: 108.9 kg (240 lb)  Current weight: Weight: 109.2 kg (240 lb 11.9 oz) (18 0200)    Labs:  Recent Labs      18   0450  18   0401   WBC  16.1*  10.8  9.2   RBC  3.99*  4.25*  4.21*   HEMOGLOBIN  12.2*  13.1*  12.8*   HEMATOCRIT  36.1*  37.7*  37.6*   MCV  90.5  88.7  89.3   MCH  30.6  30.8  30.4   MCHC  33.8  34.7  34.0   RDW  43.0  42.2  42.4   PLATELETCT  202  213  217   MPV  10.3  10.0  9.9     Recent Labs      185  18   0450  18   0401   NEUTSPOLYS  79.30*  66.40  59.70   LYMPHOCYTES  6.70*  9.60*  9.10*   MONOCYTES  12.80  21.90*  25.50*   EOSINOPHILS  0.20  0.70  4.10   BASOPHILS  0.40  0.40  0.50     Recent Labs      18   0415  18   0450  18   0401   SODIUM  132*  134*  137   POTASSIUM  3.9  3.7  3.2*   CHLORIDE  93*  93*  96   CO2  31  33  33   GLUCOSE  117*  120*   122*   BUN  33*  30*  26*   CREATININE  1.05  1.06  1.08   CALCIUM  8.7  8.5  8.2*           Medications:  • metoprolol  37.5 mg     • furosemide  40 mg     • potassium chloride SA  20 mEq     • metoclopramide  10 mg     • baclofen  10 mg     • amiodarone  400 mg     • docusate sodium  100 mg      And   • senna-docusate  1 Tab     • enoxaparin  40 mg     • aspirin EC  81 mg     • clopidogrel  75 mg     • losartan  50 mg     • rosuvastatin  20 mg         Exam:   Review of Systems   Constitutional: Positive for malaise/fatigue.   HENT: Negative.    Eyes: Negative.    Respiratory: Negative.    Cardiovascular: Chest pain: MSK.   Gastrointestinal: Positive for abdominal pain and diarrhea. Negative for constipation.   Genitourinary: Negative.    Musculoskeletal: Negative.    Skin: Negative.    Neurological: Negative.    Endo/Heme/Allergies: Negative.    Psychiatric/Behavioral: Negative.        Physical Exam   Constitutional: He is oriented to person, place, and time. He appears well-developed and well-nourished.   HENT:   Head: Normocephalic.   Eyes: Pupils are equal, round, and reactive to light.   Neck: Normal range of motion. No JVD present.   Cardiovascular: Normal rate, regular rhythm and normal heart sounds.    Pulmonary/Chest: Effort normal and breath sounds normal.   Dim bases, shallow breaths   Abdominal: He exhibits distension. Bowel sounds are increased. There is tenderness in the right upper quadrant. There is no guarding.   Musculoskeletal: Normal range of motion. He exhibits edema.   Neurological: He is alert and oriented to person, place, and time.   Skin: Skin is warm and dry.   prevena to chest, EVH sites CDI   Psychiatric: He has a normal mood and affect. His behavior is normal. Judgment and thought content normal.       Quality Measures:   Quality-Core Measures   Bravo catheter::  No Bravo  Central line in place:  Need for access  DVT prophylaxis pharmacological::  Contraindicated - High bleeding  risk  DVT prophylaxis - mechanical:  SCDs  Ulcer Prophylaxis::  Yes  Assessed for rehabilitation services:  Patient returned to prior level of function, rehabilitation not indicated at this time    Assessment/Plan:  POD 1 HDS on low dose epi.  NSR. Vented, ABGs good, weaning to extubated this AM. CT output min, no airlekas.  Will keep CTs/gill today.  Plan to start diuresis once BP stable off epi.  Continuing ABX x 72 hours for bilateral ELIANE harvests.  CPM.  POD 2 HDS< NSR (afib with RVR this AM, converted on amio gtt protocol). Pain issues, shallow breathing.  CT output min, no airleak.  Passing gas, no BM.  PLAN:  Continue amio protocol. Add PO dilaudid.  Stress IS use.  DC mediastinal CTs and gill.  Diurese.   POD 3 HDS, Afib for 40 minutes this am- now SR- on amio gtt, CT bilat pleural tubes- 50/70 cc, sedate- dc ms contin, inc diuresis, amb, enc IS  POD 4 HDS, NSR on amio, min output blakes.  ABD distention, hyper/distant bowel sounds, RUQ pain.  States passing gas, but no BM.  PLAN:  DC blakes.  Continue diuresis.  ABD xray now.  Add reglan. Limit narcotics. AMB/IS.  POD 5 HDS/HTN, NSR/ST, short bout of afib again with ambulation rates to 140s, currently NSR on amio gtt.  States feels a little better this AM.  Had large loose/watery stool this AM.  ABD remains very distended, hypobowel sounds.  PLAN:  Keep NPO, gen surg following.  Continue diuresis, decrease amount since NPO.  Decrease amio gtt to 0.5 mg/min.  Increase BB. AMB/IS.    POD 6 HDS, SR/ST- inc beta blocker- change amio to PO, ileus- tolerating clear liquids- surgery following, replace K+,  amb, enc IS     Patient seen, examined and plan reviewed with midlevel provider. I agree with the plan.      Active Hospital Problems    Diagnosis   • STEMI (ST elevation myocardial infarction) (HCC) [I21.3]     Priority: High   • HTN (hypertension) [I10]

## 2018-05-03 NOTE — PROGRESS NOTES
Pulmonary Critical Care Progress Note      Date of Admission:  4/25/2018  Date of Service:  5/3/2018  Chief Complaint:  Postoperative ventilator management for 4 V CABG    Interval Events:  24 hour interval history reviewed   - POD#6 for 4V CABG   - no events overnight   - a/ox4   - SR/ST 80-100s   - -150s   - clear liquid diet   - freqent watery BMs   - mobilizing well   - IS at 1500-1700cc   - no CXR today   - glucose controlled    Yesterday's Events:   - POD#5 for 4V CABG   - a/ox4   - flat affect   - SR/a-fib 80-90s to 100-120s   - amio boluses   - amio at 0.5   - NPO for ileus and ok for meds   - -140s   - loose watery BM this morning   - IS at 1750cc   - no CXR today   - abx finished yesterday   - lovenox    Review of Systems   Constitutional: Positive for malaise/fatigue. Negative for chills and fever.   HENT: Negative for congestion, hearing loss, sore throat and tinnitus.    Eyes: Negative for blurred vision and double vision.   Respiratory: Negative for cough, sputum production and shortness of breath.    Cardiovascular: Positive for palpitations. Negative for chest pain and leg swelling.   Gastrointestinal: Positive for diarrhea. Negative for abdominal pain, blood in stool, constipation, nausea and vomiting.   Genitourinary: Negative for dysuria, frequency, hematuria and urgency.   Musculoskeletal: Negative for back pain and neck pain.   Skin: Negative.    Neurological: Positive for weakness. Negative for sensory change, speech change and headaches.   Endo/Heme/Allergies: Negative.    Psychiatric/Behavioral: Negative for depression. The patient is not nervous/anxious and does not have insomnia.        Physical Exam   Constitutional: He is oriented to person, place, and time and well-developed, well-nourished, and in no distress.   obese   HENT:   Right Ear: External ear normal.   Left Ear: External ear normal.   Nose: Nose normal.   Mouth/Throat: No oropharyngeal exudate.   Eyes:  Conjunctivae and EOM are normal. Pupils are equal, round, and reactive to light. No scleral icterus.   Neck: Normal range of motion. Neck supple. No thyromegaly present.   Cardiovascular: Normal rate, regular rhythm, normal heart sounds and intact distal pulses.    No murmur heard.  Pulmonary/Chest: Effort normal and breath sounds normal. No stridor. No respiratory distress. He exhibits no tenderness.   Abdominal: Soft. He exhibits distension. He exhibits no mass. There is no tenderness. There is no guarding.   Hypoactive bowel sounds   Musculoskeletal: Normal range of motion. He exhibits no edema or tenderness.   Lymphadenopathy:     He has no cervical adenopathy.   Neurological: He is alert and oriented to person, place, and time. He has normal reflexes. No cranial nerve deficit. Gait normal.   Skin: Skin is warm and dry. No rash noted. He is not diaphoretic. No erythema.   Psychiatric: Mood, memory and judgment normal.   Flat affect noted   Nursing note and vitals reviewed.      PFSH:  No change.    Respiratory:     Pulse Oximetry: 92 %    HemoDynamics:  Pulse: 87, Heart Rate (Monitored): 87  NIBP: 115/88        Fluids:  Intake/Output       05/01/18 0700 - 05/02/18 0659 05/02/18 0700 - 05/03/18 0659 05/03/18 0700 - 05/04/18 0659      6715-2157 8849-4086 Total 8502-1140 4004-6350 Total 3550-7998 8064-0810 Total       Intake    P.O.  --  -- --  120  200 320  --  -- --    P.O. -- -- -- 120 200 320 -- -- --    I.V.  5.8  450.4 456.2  234  136 370  --  -- --    Amiodarone Volume 5.8 450.4 456.2 234 136 370 -- -- --    Total Intake 5.8 450.4 456.2 354 336 690 -- -- --       Output    Urine  2100 2050 4150  2900  600 3500  --  -- --    Number of Times Voided -- -- -- 8 x -- 8 x -- -- --    Urine Void (mL) (non-catheter) 2100 2050 4150 2900 600 3500 -- -- --    Stool  --  -- --  --  -- --  --  -- --    Number of Times Stooled 2 x -- 2 x 7 x 3 x 10 x -- -- --    Total Output 2100 2050 4150 2900 600 3500 -- -- --        Net I/O     -2094.3 -1599.6 -3693.9 -2546 -264 -2810 -- -- --        Weight: 109.2 kg (240 lb 11.9 oz)  Recent Labs      18   0401   SODIUM  132*  134*  137   POTASSIUM  3.9  3.7  3.2*   CHLORIDE  93*  93*  96   CO2  31  33  33   BUN  33*  30*  26*   CREATININE  1.05  1.06  1.08   MAGNESIUM   --    --   2.4   CALCIUM  8.7  8.5  8.2*       GI/Nutrition:    Liver Function  Recent Labs      18   0401   ALTSGPT  47  41  37   ASTSGOT  49*  37  28   ALKPHOSPHAT  85  75  68   TBILIRUBIN  2.2*  2.6*  1.7*   GLUCOSE  117*  120*  122*       Heme:  Recent Labs      18   0401   RBC  3.99*  4.25*  4.21*   HEMOGLOBIN  12.2*  13.1*  12.8*   HEMATOCRIT  36.1*  37.7*  37.6*   PLATELETCT  202  213  217       Infectious Disease:  Temp  Av.2 °C (97.2 °F)  Min: 36 °C (96.8 °F)  Max: 36.3 °C (97.4 °F)    Recent Labs      18   0401   WBC  16.1*  10.8  9.2   NEUTSPOLYS  79.30*  66.40  59.70   LYMPHOCYTES  6.70*  9.60*  9.10*   MONOCYTES  12.80  21.90*  25.50*   EOSINOPHILS  0.20  0.70  4.10   BASOPHILS  0.40  0.40  0.50   ASTSGOT  49*  37  28   ALTSGPT  47  41  37   ALKPHOSPHAT  85  75  68   TBILIRUBIN  2.2*  2.6*  1.7*     Current Facility-Administered Medications   Medication Dose Frequency Provider Last Rate Last Dose   • metoprolol (LOPRESSOR) tablet 37.5 mg  37.5 mg BID Alisson Escobar, A.P.N.   37.5 mg at 18   • furosemide (LASIX) injection 40 mg  40 mg Q DAY Alisson Escobar, A.P.N.       • potassium chloride SA (Kdur) tablet 20 mEq  20 mEq DAILY Alisson Escobar, A.P.N.       • metoclopramide (REGLAN) injection 10 mg  10 mg Q6HRS Alisson Escobar, A.P.N.   10 mg at 18   • baclofen (LIORESAL) tablet 10 mg  10 mg TID Alisson Escobar, A.P.N.   10 mg at 18   • amiodarone (CORDARONE) 450 mg in D5W 250 mL Infusion  0.5 mg/min Continuous  Alisson Escobar, A.P.N. 17 mL/hr at 05/03/18 0345 0.5 mg/min at 05/03/18 0345   • amiodarone (CORDARONE) tablet 400 mg  400 mg BID Alisson Escobar, A.P.N.   400 mg at 05/02/18 1759   • ipratropium-albuterol (DUONEB) nebulizer solution 3 mL  3 mL Q4H PRN (RT) Andres Guidry M.D.   3 mL at 04/28/18 2141   • Respiratory Care per Protocol   Continuous RT Alisson Escobar, A.P.N.       • NS infusion   Continuous Alisson Escobar, A.P.N.   Stopped at 05/01/18 2122   • Pharmacy Consult Request ...Pain Management Review 1 Each  1 Each PRN Alisson Escobar, A.P.N.       • docusate sodium (COLACE) capsule 100 mg  100 mg QAM Alisson Escobar, A.P.N.   Stopped at 05/02/18 0900    And   • senna-docusate (PERICOLACE or SENOKOT S) 8.6-50 MG per tablet 1 Tab  1 Tab Nightly Alisson Escobar, A.P.N.   1 Tab at 05/01/18 2117    And   • senna-docusate (PERICOLACE or SENOKOT S) 8.6-50 MG per tablet 1 Tab  1 Tab Q24HRS PRN Alisson Escobar, A.P.N.   1 Tab at 04/30/18 1032    And   • lactulose 20 GM/30ML solution 30 mL  30 mL Q24HRS PRN Alisson Escobar, A.P.N.   30 mL at 04/30/18 1955    And   • bisacodyl (DULCOLAX) suppository 10 mg  10 mg Q24HRS PRN Alisson Escobar, A.P.N.        And   • fleet enema 133 mL  1 Each Once PRN Alisson Escobar, A.P.N.       • enoxaparin (LOVENOX) inj 40 mg  40 mg DAILY Alisson Escobar, A.P.N.   40 mg at 05/02/18 0733   • aspirin EC (ECOTRIN) tablet 81 mg  81 mg DAILY Alisson Escobar, A.P.N.   81 mg at 05/02/18 0729   • clopidogrel (PLAVIX) tablet 75 mg  75 mg DAILY Alisson Escobar, A.P.N.   75 mg at 05/02/18 0728   • oxyCODONE immediate-release (ROXICODONE) tablet 5 mg  5 mg Q3HRS PRN Alisson Escobar, A.P.N.   5 mg at 05/03/18 0127   • oxyCODONE immediate release (ROXICODONE) tablet 10 mg  10 mg Q3HRS PRN Alisson Escobar, A.P.N.   10 mg at 05/01/18 1801   • tramadol (ULTRAM) 50 MG tablet 50 mg  50 mg Q4HRS PRN Alisson Escobar, A.P.N.   50 mg at 05/02/18 0147   • ondansetron (ZOFRAN)  syringe/vial injection 4 mg  4 mg Q6HRS PRN Alisson L. Shawn, A.P.N.        Or   • prochlorperazine (COMPAZINE) injection 10 mg  10 mg Q6HRS PRN Alisson L. Shawn, A.P.N.        Or   • promethazine (PHENERGAN) suppository 25 mg  25 mg Q6HRS PRN Alisson L. Shawn, A.P.N.       • acetaminophen (TYLENOL) tablet 650 mg  650 mg Q4HRS PRN Alisson L. Shawn, A.P.N.   650 mg at 04/30/18 1910    Or   • acetaminophen (TYLENOL) suppository 650 mg  650 mg Q4HRS PRN Alisson L. Shawn, A.P.N.       • mag hydrox-al hydrox-simeth (MAALOX PLUS ES or MYLANTA DS) suspension 30 mL  30 mL Q4HRS PRN Alisson L. Shawn, A.P.N.   30 mL at 04/30/18 1925   • diphenhydrAMINE (BENADRYL) tablet/capsule 25 mg  25 mg HS PRN - MR X 1 Alisson L. Shawn, A.P.N.   25 mg at 04/29/18 0035   • dextrose 50% (D50W) injection 25 mL  25 mL PRN Alisson L. Shawn, A.P.N.       • losartan (COZAAR) tablet 50 mg  50 mg DAILY Adelina Vieyra M.D.   50 mg at 05/02/18 0728   • rosuvastatin (CRESTOR) tablet 20 mg  20 mg Q EVENING Leighton Cook M.D.   20 mg at 05/02/18 2025     Last reviewed on 4/28/2018  8:25 PM by Iain An R.N.    Quality  Measures:  Labs reviewed, Medications reviewed and Radiology images reviewed  Bravo catheter: No Bravo  Central line in place: Need for access    DVT Prophylaxis: Enoxaparin (Lovenox)  DVT prophylaxis - mechanical: SCDs  Ulcer prophylaxis: Not indicated          Assessment and Plan:    Postoperative ventilator management   - extubated 4/28   - IS/PEP, mobilize   - cont RT protocols   - cont forced diuresis and increase dose  Status post 4-vessel coronary bypass grafting - 4/27   - aspirin, Plavix, statin, BB   - [facility] protocols  Status post inferior STEMI   - Status post thrombolytic therapy at outside facility   - Status post emergent angioplasty of subtotally occluded mid circumflex coronary artery   - meds as above  Acute Ileus   - gi consulted   - having watery BMs now and feeling slightly better   - tolerating clears-->advance  today   - Mg normal at 2.4   - stop reglan   - cont stool softeners  Acute systolic heart failure   - follow I/O closely   - cont lasix  History of hypertension   - cont metoprolol  Hypokalemia   - Replete potassium  Hiccups   - resolved   - cont baclofen orally   - stopping reglan  AF/RVR   - controlled and now in SR   - stopped IV amio   - cont oral amiodarone   - start oral metoprolol  Prophylaxis   - lovenox, scds    Will continue to follow in the ICU  [id number]

## 2018-05-03 NOTE — CARE PLAN
Problem: Post Op Day 4 CABG/Heart Valve Replacement  Goal: Optimal care of the Post Op CABG/Heart Valve replacement Post Op Day 4  Outcome: PROGRESSING AS EXPECTED  Tele status, wires out  Tolerating scheduled baclofen. Loose watery smear stools. Tolerating clear diet.   Intervention: Daily Weights  See flow sheet  Intervention: Shower daily and clean incisions twice daily with soap and water  Complete for day shift.   Intervention: Up in chair for all meals  Up to chair for all meals.   Intervention: Ambulate, increasing the distance each time x 3 and before bed  Ambulating masterson x2 per round.   Intervention: IS q 1 hour while awake and record best IS volume  1250  Intervention: Consider removal of gill, chest tube and pacer wire if not already done  Complete  Intervention: Discharge Education  Will need [facility] d/c instructions. Parents involved in care and have scheduled f/u appointments in [place], where pt lives.

## 2018-05-04 ENCOUNTER — APPOINTMENT (OUTPATIENT)
Dept: RADIOLOGY | Facility: MEDICAL CENTER | Age: 48
DRG: 231 | End: 2018-05-04
Attending: SURGERY
Payer: COMMERCIAL

## 2018-05-04 LAB
ALBUMIN SERPL BCP-MCNC: 3.1 G/DL (ref 3.2–4.9)
ALBUMIN/GLOB SERPL: 1.2 G/DL
ALP SERPL-CCNC: 57 U/L (ref 30–99)
ALT SERPL-CCNC: 39 U/L (ref 2–50)
ANION GAP SERPL CALC-SCNC: 10 MMOL/L (ref 0–11.9)
ANISOCYTOSIS BLD QL SMEAR: ABNORMAL
AST SERPL-CCNC: 32 U/L (ref 12–45)
BASOPHILS # BLD AUTO: 0 % (ref 0–1.8)
BASOPHILS # BLD: 0 K/UL (ref 0–0.12)
BILIRUB SERPL-MCNC: 1.5 MG/DL (ref 0.1–1.5)
BUN SERPL-MCNC: 28 MG/DL (ref 8–22)
CALCIUM SERPL-MCNC: 8.2 MG/DL (ref 8.5–10.5)
CHLORIDE SERPL-SCNC: 96 MMOL/L (ref 96–112)
CO2 SERPL-SCNC: 31 MMOL/L (ref 20–33)
CREAT SERPL-MCNC: 1.34 MG/DL (ref 0.5–1.4)
EOSINOPHIL # BLD AUTO: 0.13 K/UL (ref 0–0.51)
EOSINOPHIL NFR BLD: 0.9 % (ref 0–6.9)
ERYTHROCYTE [DISTWIDTH] IN BLOOD BY AUTOMATED COUNT: 42 FL (ref 35.9–50)
GLOBULIN SER CALC-MCNC: 2.6 G/DL (ref 1.9–3.5)
GLUCOSE SERPL-MCNC: 116 MG/DL (ref 65–99)
HCT VFR BLD AUTO: 39.3 % (ref 42–52)
HGB BLD-MCNC: 13.1 G/DL (ref 14–18)
LYMPHOCYTES # BLD AUTO: 1.46 K/UL (ref 1–4.8)
LYMPHOCYTES NFR BLD: 10.4 % (ref 22–41)
MANUAL DIFF BLD: ABNORMAL
MCH RBC QN AUTO: 29.6 PG (ref 27–33)
MCHC RBC AUTO-ENTMCNC: 33.3 G/DL (ref 33.7–35.3)
MCV RBC AUTO: 88.7 FL (ref 81.4–97.8)
MICROCYTES BLD QL SMEAR: ABNORMAL
MONOCYTES # BLD AUTO: 1.46 K/UL (ref 0–0.85)
MONOCYTES NFR BLD AUTO: 10.4 % (ref 0–13.4)
MORPHOLOGY BLD-IMP: NORMAL
NEUTROPHILS # BLD AUTO: 10.96 K/UL (ref 1.82–7.42)
NEUTROPHILS NFR BLD: 65.2 % (ref 44–72)
NEUTS BAND NFR BLD MANUAL: 13.1 % (ref 0–10)
NRBC # BLD AUTO: 0 K/UL
NRBC BLD-RTO: 0 /100 WBC
PLATELET # BLD AUTO: 286 K/UL (ref 164–446)
PLATELET BLD QL SMEAR: NORMAL
PMV BLD AUTO: 9.7 FL (ref 9–12.9)
POLYCHROMASIA BLD QL SMEAR: NORMAL
POTASSIUM SERPL-SCNC: 3 MMOL/L (ref 3.6–5.5)
PROCALCITONIN SERPL-MCNC: 0.76 NG/ML
PROT SERPL-MCNC: 5.7 G/DL (ref 6–8.2)
RBC # BLD AUTO: 4.43 M/UL (ref 4.7–6.1)
RBC BLD AUTO: PRESENT
SODIUM SERPL-SCNC: 137 MMOL/L (ref 135–145)
TOXIC GRANULES BLD QL SMEAR: SLIGHT
WBC # BLD AUTO: 14 K/UL (ref 4.8–10.8)

## 2018-05-04 PROCEDURE — 770020 HCHG ROOM/CARE - TELE (206)

## 2018-05-04 PROCEDURE — A9270 NON-COVERED ITEM OR SERVICE: HCPCS | Performed by: NURSE PRACTITIONER

## 2018-05-04 PROCEDURE — 700111 HCHG RX REV CODE 636 W/ 250 OVERRIDE (IP): Performed by: NURSE PRACTITIONER

## 2018-05-04 PROCEDURE — 700102 HCHG RX REV CODE 250 W/ 637 OVERRIDE(OP): Performed by: INTERNAL MEDICINE

## 2018-05-04 PROCEDURE — 700102 HCHG RX REV CODE 250 W/ 637 OVERRIDE(OP): Performed by: NURSE PRACTITIONER

## 2018-05-04 PROCEDURE — 85027 COMPLETE CBC AUTOMATED: CPT

## 2018-05-04 PROCEDURE — 85007 BL SMEAR W/DIFF WBC COUNT: CPT

## 2018-05-04 PROCEDURE — 84145 PROCALCITONIN (PCT): CPT

## 2018-05-04 PROCEDURE — 700105 HCHG RX REV CODE 258: Performed by: NURSE PRACTITIONER

## 2018-05-04 PROCEDURE — A9270 NON-COVERED ITEM OR SERVICE: HCPCS | Performed by: INTERNAL MEDICINE

## 2018-05-04 PROCEDURE — 80053 COMPREHEN METABOLIC PANEL: CPT

## 2018-05-04 RX ORDER — SODIUM CHLORIDE 9 MG/ML
INJECTION, SOLUTION INTRAVENOUS CONTINUOUS
Status: DISCONTINUED | OUTPATIENT
Start: 2018-05-04 | End: 2018-05-08 | Stop reason: HOSPADM

## 2018-05-04 RX ORDER — POTASSIUM CHLORIDE 29.8 MG/ML
40 INJECTION INTRAVENOUS ONCE
Status: COMPLETED | OUTPATIENT
Start: 2018-05-04 | End: 2018-05-04

## 2018-05-04 RX ADMIN — ROSUVASTATIN CALCIUM 20 MG: 10 TABLET, FILM COATED ORAL at 20:05

## 2018-05-04 RX ADMIN — AMIODARONE HYDROCHLORIDE 400 MG: 200 TABLET ORAL at 17:47

## 2018-05-04 RX ADMIN — CLOPIDOGREL 75 MG: 75 TABLET, FILM COATED ORAL at 10:54

## 2018-05-04 RX ADMIN — PROCHLORPERAZINE EDISYLATE 10 MG: 5 INJECTION INTRAMUSCULAR; INTRAVENOUS at 05:49

## 2018-05-04 RX ADMIN — POTASSIUM CHLORIDE 40 MEQ: 29.8 INJECTION, SOLUTION INTRAVENOUS at 11:19

## 2018-05-04 RX ADMIN — METOPROLOL TARTRATE 50 MG: 50 TABLET, FILM COATED ORAL at 10:54

## 2018-05-04 RX ADMIN — METOPROLOL TARTRATE 50 MG: 50 TABLET, FILM COATED ORAL at 20:06

## 2018-05-04 RX ADMIN — ENOXAPARIN SODIUM 40 MG: 100 INJECTION SUBCUTANEOUS at 10:54

## 2018-05-04 RX ADMIN — SODIUM CHLORIDE: 9 INJECTION, SOLUTION INTRAVENOUS at 10:55

## 2018-05-04 RX ADMIN — ONDANSETRON 4 MG: 2 INJECTION INTRAMUSCULAR; INTRAVENOUS at 03:03

## 2018-05-04 ASSESSMENT — PAIN SCALES - GENERAL
PAINLEVEL_OUTOF10: 0

## 2018-05-04 ASSESSMENT — ENCOUNTER SYMPTOMS
PSYCHIATRIC NEGATIVE: 1
VOMITING: 1
NERVOUS/ANXIOUS: 0
ABDOMINAL PAIN: 1
RESPIRATORY NEGATIVE: 1
TREMORS: 0
COUGH: 0
TINGLING: 0
DEPRESSION: 0
WEAKNESS: 0
MUSCULOSKELETAL NEGATIVE: 1
CONSTIPATION: 0
NECK PAIN: 0
SENSORY CHANGE: 0
BLURRED VISION: 0
NAUSEA: 1
ROS GI COMMENTS: BLOATED
SPEECH CHANGE: 0
CHILLS: 0
EYE PAIN: 0
BACK PAIN: 0
NEUROLOGICAL NEGATIVE: 1
HEADACHES: 0
DOUBLE VISION: 0
DIAPHORESIS: 0
FOCAL WEAKNESS: 1
DIARRHEA: 1
FEVER: 0
SHORTNESS OF BREATH: 0
EYES NEGATIVE: 1
PALPITATIONS: 0
SORE THROAT: 0

## 2018-05-04 NOTE — PROGRESS NOTES
"S:  48 y.o.male s/p coronary artery bypass with postoperative ileus. Overnight patient had continued loose bowel movements but had several episodes of nausea and vomiting.Patient states that he feels very full.    O:  Blood pressure 150/94, pulse (!) 104, temperature 36.7 °C (98.1 °F), resp. rate 20, height 1.854 m (6' 1\"), weight 108.4 kg (238 lb 15.7 oz), SpO2 92 %.  I/O last 3 completed shifts:  In: 1090.2 [P.O.:600; I.V.:490.2]  Out: 5340 [Urine:5250]  Recent Labs      05/02/18   0450 05/03/18   0401  05/04/18   0550   SODIUM  134*  137  137   POTASSIUM  3.7  3.2*  3.0*   CHLORIDE  93*  96  96   CO2  33  33  31   GLUCOSE  120*  122*  116*   BUN  30*  26*  28*   CREATININE  1.06  1.08  1.34   CALCIUM  8.5  8.2*  8.2*     Recent Labs      05/02/18   0450  05/03/18   0401  05/04/18   0550   WBC  10.8  9.2  14.0*   RBC  4.25*  4.21*  4.43*   HEMOGLOBIN  13.1*  12.8*  13.1*   HEMATOCRIT  37.7*  37.6*  39.3*   MCV  88.7  89.3  88.7   MCH  30.8  30.4  29.6   MCHC  34.7  34.0  33.3*   RDW  42.2  42.4  42.0   PLATELETCT  213  217  286   MPV  10.0  9.9  9.7       Alert and Oriented x3, No Acute Distress  Normal Respiratory Effort  Abdomen softer, minimal distention without tenderness, rigidity or guarding  Incisions/Bandages clean/dry/intact  Extremities warm and well perfused      A/P: Postoperative ileus versus obstruction  Pain control with minimal narcotics  Potassium continues to decline.  Recommend replacement for GI function.  Nothing by mouth except meds with sips with upper GI and small bowel follow-through depending  Ambulate tid and ad mishel  Stuart Sanabria MD PhD  [facility]  Colon and Rectal Surgeon  [phone]    "

## 2018-05-04 NOTE — CARE PLAN
Problem: Post Op Day 4 CABG/Heart Valve Replacement  Goal: Optimal care of the Post Op CABG/Heart Valve replacement Post Op Day 4  Outcome: PROGRESSING AS EXPECTED    Intervention: Daily Weights  Done   Intervention: Shower daily and clean incisions twice daily with soap and water  Done   Intervention: Up in chair for all meals  Done   Intervention: Ambulate, increasing the distance each time x 3 and before bed  Done   Intervention: IS q 1 hour while awake and record best IS volume  Done   Intervention: Consider removal of gill, chest tube and pacer wire if not already done  Not applicable

## 2018-05-04 NOTE — CARE PLAN
Problem: Knowledge Deficit  Goal: Knowledge of disease process/condition, treatment plan, diagnostic tests, and medications will improve  Outcome: PROGRESSING AS EXPECTED  Pt and Family oriented to unit routine. Pt and family educated regarding activity, diet, meds and plan of care; questions answered; verbalized understanding. Pt and Family denies having further needs at this time.    Problem: Post Op Day 4 CABG/Heart Valve Replacement  Goal: Optimal care of the Post Op CABG/Heart Valve replacement Post Op Day 4  Outcome: PROGRESSING AS EXPECTED    Intervention: Daily Weights  Completed and reviewed   Intervention: Shower daily and clean incisions twice daily with soap and water  Pt showered and cleansed incision sites  Intervention: Up in chair for all meals  Completed and documented  Intervention: Ambulate, increasing the distance each time x 3 and before bed  Pt ambulated more than 5 times during day shift  Intervention: IS q 1 hour while awake and record best IS volume  Best IS 1750  Intervention: Consider removal of gill, chest tube and pacer wire if not already done  Not applicable  Intervention: Discharge Education  Initiated. Pt's discharge is TBD

## 2018-05-04 NOTE — PROGRESS NOTES
"S:  48 y.o.male s/p coronary artery bypass with postoperative ileus. Overnight patient had bowel movements and tolerated clears.   O:  Blood pressure 150/94, pulse (!) 104, temperature 36.1 °C (97 °F), resp. rate 20, height 1.854 m (6' 1\"), weight 109.2 kg (240 lb 11.9 oz), SpO2 91 %.  I/O last 3 completed shifts:  In: 1090.2 [P.O.:600; I.V.:490.2]  Out: 5340 [Urine:5250]  Recent Labs      05/01/18 0415  05/02/18   0450  05/03/18   0401   SODIUM  132*  134*  137   POTASSIUM  3.9  3.7  3.2*   CHLORIDE  93*  93*  96   CO2  31  33  33   GLUCOSE  117*  120*  122*   BUN  33*  30*  26*   CREATININE  1.05  1.06  1.08   CALCIUM  8.7  8.5  8.2*     Recent Labs      05/01/18 0415  05/02/18   0450  05/03/18   0401   WBC  16.1*  10.8  9.2   RBC  3.99*  4.25*  4.21*   HEMOGLOBIN  12.2*  13.1*  12.8*   HEMATOCRIT  36.1*  37.7*  37.6*   MCV  90.5  88.7  89.3   MCH  30.6  30.8  30.4   MCHC  33.8  34.7  34.0   RDW  43.0  42.2  42.4   PLATELETCT  202  213  217   MPV  10.3  10.0  9.9       Alert and Oriented x3, No Acute Distress  Normal Respiratory Effort  Abdomen softer, nontender with resolved distention  Incisions/Bandages clean/dry/intact  Extremities warm and well perfused      A/P: Postoperative ileus is resolving  Pain control with minimal narcotics  Replace potassium  Advance diet  DC planning per primary   Ambulate tid and ad mishel  Stuart Sanabria MD PhD  [facility]  Colon and Rectal Surgeon  [phone]    "

## 2018-05-04 NOTE — CARE PLAN
Problem: Post Op Day 4 CABG/Heart Valve Replacement  Goal: Optimal care of the Post Op CABG/Heart Valve replacement Post Op Day 4    Intervention: Daily Weights  completed  Intervention: Shower daily and clean incisions twice daily with soap and water  Up for shower  Intervention: Up in chair for all meals  NPO. Up walking frequently  Intervention: Ambulate, increasing the distance each time x 3 and before bed  completed  Intervention: IS q 1 hour while awake and record best IS volume  1800 best  Intervention: Consider removal of gill, chest tube and pacer wire if not already done  completed

## 2018-05-04 NOTE — DIETARY
Nutrition Services: Inadequate nutrition, admit day 9. Pt is currently NPO secondary to post-op ileus. Surgery following, stated overnight patient had continued loose bowel movements but had several episodes of nausea and vomiting, and patient states that he feels very full; plan upper GI and small bowel follow-through. RD will continue to monitor. Do not anticipate the need for nutrition support, as pt came in to the hospital with a negative nutritional admit screen; BMI >30.

## 2018-05-04 NOTE — THERAPY
Pt not feeling well today, no PT. Per nrsg, pt is getting up w/nrsg around room and hallway distances. PT will resume next week for post [facility] education.

## 2018-05-04 NOTE — PROGRESS NOTES
Pulmonary Critical Care Progress Note      Date of Admission:  4/25/2018  Date of Service:  5/4/2018  Chief Complaint:  Postoperative ventilator management for 4 V CABG    Interval Events:  24 hour interval history reviewed   - POD#7 for 4V CABG   - watery BMs and N/V overnight   - surgery saw and still NPO except sips of water   - Upper GI with small bowel follow through today   - no other overnight events   - SR/a-fib/SR 90-100s   - -120s   - sips of water with meds   - IS 1500cc   - no CXR   - adequate UOP with urinal   - good glucose control    Yesterday's Events:   - POD#6 for 4V CABG   - no events overnight   - a/ox4   - SR/ST 80-100s   - -150s   - clear liquid diet   - freqent watery BMs   - mobilizing well   - IS at 1500-1700cc   - no CXR today   - glucose controlled      Review of Systems   Constitutional: Negative for chills, diaphoresis and fever.   HENT: Negative for congestion, ear pain, hearing loss, sore throat and tinnitus.    Eyes: Negative for blurred vision, double vision and pain.   Respiratory: Negative for cough and shortness of breath.    Cardiovascular: Negative for chest pain, palpitations and leg swelling.   Gastrointestinal: Positive for abdominal pain, diarrhea, nausea and vomiting. Negative for constipation.        Bloated   Genitourinary: Negative for dysuria, frequency and urgency.   Musculoskeletal: Negative for back pain and neck pain.   Skin: Negative.    Neurological: Positive for focal weakness. Negative for tingling, tremors, sensory change, speech change, weakness and headaches.   Endo/Heme/Allergies: Negative.    Psychiatric/Behavioral: Negative for depression. The patient is not nervous/anxious.        Physical Exam   Constitutional: He is oriented to person, place, and time and well-developed, well-nourished, and in no distress.   obese   HENT:   Right Ear: External ear normal.   Left Ear: External ear normal.   Nose: Nose normal.   Mouth/Throat: No  oropharyngeal exudate.   Eyes: Conjunctivae and EOM are normal. Pupils are equal, round, and reactive to light. No scleral icterus.   Neck: Normal range of motion. Neck supple. No thyromegaly present.   Cardiovascular: Normal rate, regular rhythm, normal heart sounds and intact distal pulses.    No murmur heard.  Pulmonary/Chest: Effort normal and breath sounds normal. No stridor. No respiratory distress. He exhibits no tenderness.   Abdominal: Soft. He exhibits distension. He exhibits no mass. There is no tenderness. There is no guarding.   Hypoactive bowel sounds   Musculoskeletal: Normal range of motion. He exhibits no edema or tenderness.   Lymphadenopathy:     He has no cervical adenopathy.   Neurological: He is alert and oriented to person, place, and time. He has normal reflexes. No cranial nerve deficit. Gait normal.   Skin: Skin is warm and dry. No rash noted. He is not diaphoretic. No erythema.   Psychiatric: Mood, memory and judgment normal.   Flat affect noted   Nursing note and vitals reviewed.  no change to exam today    PFSH:  No change.    Respiratory:     Pulse Oximetry: 92 %    HemoDynamics:  Pulse: (!) 104  Blood Pressure: 150/94, NIBP: 116/74        Fluids:  Intake/Output       05/02/18 0700 - 05/03/18 0659 05/03/18 0700 - 05/04/18 0659 05/04/18 0700 - 05/05/18 0659      [id number] 3939-7826 Total 7153-2640 3375-5319 Total 2464-8134 9939-9712 Total       Intake    P.O.  120  200 320  960  100 1060  --  -- --    P.O. 120 200 320  -- -- --    I.V.  234  136 370  51  -- 51  --  -- --    Amiodarone Volume 234 136 370 51 -- 51 -- -- --    Total Intake 354  100 1111 -- -- --       Output    Urine  2900  900 3800  300  1300 1600  --  -- --    Number of Times Voided 8 x -- 8 x 5 x -- 5 x -- -- --    Urine Void (mL) (non-catheter) 2900 900 3800 300 1300 1600 -- -- --    Emesis  --  -- --  --  -- --  --  -- --    Emesis - Number of Times -- -- -- -- 1 x 1 x -- -- --    Stool  --  --  --  --  -- --  --  -- --    Number of Times Stooled 7 x 4 x 11 x 5 x -- 5 x -- -- --    Total Output 2900 900 3800 300 1300 1600 -- -- --       Net I/O     -2546 -564 -3110 711 -1200 -489 -- -- --           Recent Labs      18   0401   SODIUM  134*  137   POTASSIUM  3.7  3.2*   CHLORIDE  93*  96   CO2  33  33   BUN  30*  26*   CREATININE  1.06  1.08   MAGNESIUM   --   2.4   CALCIUM  8.5  8.2*       GI/Nutrition:    Liver Function  Recent Labs      18   0401   ALTSGPT  41  37   ASTSGOT  37  28   ALKPHOSPHAT  75  68   TBILIRUBIN  2.6*  1.7*   GLUCOSE  120*  122*       Heme:  Recent Labs      18   0401   RBC  4.25*  4.21*   HEMOGLOBIN  13.1*  12.8*   HEMATOCRIT  37.7*  37.6*   PLATELETCT  213  217       Infectious Disease:  Temp  Av.6 °C (97.9 °F)  Min: 36.1 °C (97 °F)  Max: 36.7 °C (98.1 °F)    Recent Labs      18   04518   0401   WBC  10.8  9.2   NEUTSPOLYS  66.40  59.70   LYMPHOCYTES  9.60*  9.10*   MONOCYTES  21.90*  25.50*   EOSINOPHILS  0.70  4.10   BASOPHILS  0.40  0.50   ASTSGOT  37  28   ALTSGPT  41  37   ALKPHOSPHAT  75  68   TBILIRUBIN  2.6*  1.7*     Current Facility-Administered Medications   Medication Dose Frequency Provider Last Rate Last Dose   • metoprolol (LOPRESSOR) tablet 50 mg  50 mg BID Theresa Reddy, A.P.N.   50 mg at 18   • calcium carbonate (TUMS) chewable tab 500 mg  500 mg Q4HRS PRN Theresa Reddy, A.P.N.   500 mg at 18 820   • furosemide (LASIX) injection 40 mg  40 mg Q DAY Alisson Escobar, A.P.N.   40 mg at 18 0759   • potassium chloride SA (Kdur) tablet 20 mEq  20 mEq DAILY Alisson Escobar, A.P.N.   20 mEq at 18 0800   • amiodarone (CORDARONE) tablet 400 mg  400 mg BID Alisson Escobar, A.P.N.   400 mg at 18 1942   • ipratropium-albuterol (DUONEB) nebulizer solution 3 mL  3 mL Q4H PRN (RT) Andres Guidry M.D.   3 mL at 18 2141   • Respiratory Care  per Protocol   Continuous RT Alisson Escobar A.P.N.       • Pharmacy Consult Request ...Pain Management Review 1 Each  1 Each PRN Alisson Escobar A.P.N.       • docusate sodium (COLACE) capsule 100 mg  100 mg QAM Alisson Escobar, A.P.N.   100 mg at 05/03/18 0801    And   • senna-docusate (PERICOLACE or SENOKOT S) 8.6-50 MG per tablet 1 Tab  1 Tab Nightly Alisson Escobar A.P.N.   Stopped at 05/03/18 2100    And   • senna-docusate (PERICOLACE or SENOKOT S) 8.6-50 MG per tablet 1 Tab  1 Tab Q24HRS PRN Alisson Escobar, A.P.N.   1 Tab at 04/30/18 1032    And   • lactulose 20 GM/30ML solution 30 mL  30 mL Q24HRS PRN Alisson Escobar A.P.N.   30 mL at 04/30/18 1955    And   • bisacodyl (DULCOLAX) suppository 10 mg  10 mg Q24HRS PRN Alisson Escobar, A.P.N.        And   • fleet enema 133 mL  1 Each Once PRN Alisson Escobar, A.P.N.       • enoxaparin (LOVENOX) inj 40 mg  40 mg DAILY Alisson Escobar, A.P.N.   40 mg at 05/03/18 0850   • aspirin EC (ECOTRIN) tablet 81 mg  81 mg DAILY Alisson Escobar, A.P.N.   81 mg at 05/03/18 0801   • clopidogrel (PLAVIX) tablet 75 mg  75 mg DAILY Alisson Escobar, A.P.N.   75 mg at 05/03/18 0800   • oxyCODONE immediate-release (ROXICODONE) tablet 5 mg  5 mg Q3HRS PRN Alisson Escobar, A.P.N.   5 mg at 05/03/18 1942   • oxyCODONE immediate release (ROXICODONE) tablet 10 mg  10 mg Q3HRS PRN Alisson Escobar, A.P.N.   10 mg at 05/01/18 1801   • tramadol (ULTRAM) 50 MG tablet 50 mg  50 mg Q4HRS PRN Alisson Escobar, A.P.N.   50 mg at 05/02/18 0147   • ondansetron (ZOFRAN) syringe/vial injection 4 mg  4 mg Q6HRS PRRAMAN Escobar, A.P.N.   4 mg at 05/03/18 1953    Or   • prochlorperazine (COMPAZINE) injection 10 mg  10 mg Q6HRS PRN Alisson Escobar, A.P.N.        Or   • promethazine (PHENERGAN) suppository 25 mg  25 mg Q6HRS PRN Alisson Escobar, A.P.N.       • acetaminophen (TYLENOL) tablet 650 mg  650 mg Q4HRS PRRAMAN Escobar, A.P.N.   650 mg at 04/30/18 1910    Or   •  acetaminophen (TYLENOL) suppository 650 mg  650 mg Q4HRS PRN Alisson Montanat, A.P.N.       • mag hydrox-al hydrox-simeth (MAALOX PLUS ES or MYLANTA DS) suspension 30 mL  30 mL Q4HRS PRN Alisson Montanat, A.P.N.   30 mL at 05/03/18 2119   • diphenhydrAMINE (BENADRYL) tablet/capsule 25 mg  25 mg HS PRN - MR X 1 Alissonsyeda Montanat, A.P.N.   25 mg at 05/03/18 2243   • dextrose 50% (D50W) injection 25 mL  25 mL PRN Alisson Escobar, A.P.N.       • losartan (COZAAR) tablet 50 mg  50 mg DAILY Adelina Vieyra M.D.   50 mg at 05/03/18 0800   • rosuvastatin (CRESTOR) tablet 20 mg  20 mg Q EVENING Leighton Cook M.D.   20 mg at 05/03/18 1942     Last reviewed on 4/28/2018  8:25 PM by Iain An R.N.    Quality  Measures:  Labs reviewed, Medications reviewed and Radiology images reviewed  Bravo catheter: No Bravo  Central line in place: Need for access    DVT Prophylaxis: Enoxaparin (Lovenox)  DVT prophylaxis - mechanical: SCDs  Ulcer prophylaxis: Not indicated          Assessment and Plan:    Postoperative ventilator management   - extubated 4/28   - IS/PEP, mobilize   - cont RT protocols   - cont forced diuresis and increase dose  Status post 4-vessel coronary bypass grafting - 4/27   - aspirin, Plavix, statin, BB   - [facility] protocols  Status post inferior STEMI   - Status post thrombolytic therapy at outside facility   - Status post emergent angioplasty of subtotally occluded mid circumflex coronary artery   - meds as above  Acute Ileus   - surgery following   - worsened symptoms   - NPO   - upper gi with small bowel follow through today   - Mg normal at 2.4   - stopped reglan   - cont stool softeners  Acute systolic heart failure   - follow I/O closely   - cont lasix  History of hypertension   - cont metoprolol  Hypokalemia   - Replete potassium  Hiccups   - resolved   - cont baclofen orally   - stopping reglan  AF/RVR   - still in and out of SR/a-fib but rate controlled   - stopped IV amio   - cont oral amiodarone   - cont  oral metoprolol  Prophylaxis   - lovenox, scds    Will continue to follow in the ICU  [id number]

## 2018-05-05 LAB
ALBUMIN SERPL BCP-MCNC: 3.1 G/DL (ref 3.2–4.9)
ALBUMIN/GLOB SERPL: 1.1 G/DL
ALP SERPL-CCNC: 65 U/L (ref 30–99)
ALT SERPL-CCNC: 45 U/L (ref 2–50)
ANION GAP SERPL CALC-SCNC: 13 MMOL/L (ref 0–11.9)
AST SERPL-CCNC: 32 U/L (ref 12–45)
BASOPHILS # BLD AUTO: 0.9 % (ref 0–1.8)
BASOPHILS # BLD: 0.11 K/UL (ref 0–0.12)
BILIRUB SERPL-MCNC: 1.6 MG/DL (ref 0.1–1.5)
BUN SERPL-MCNC: 30 MG/DL (ref 8–22)
C DIFF DNA SPEC QL NAA+PROBE: NEGATIVE
C DIFF TOX GENS STL QL NAA+PROBE: NEGATIVE
CALCIUM SERPL-MCNC: 8.1 MG/DL (ref 8.5–10.5)
CHLORIDE SERPL-SCNC: 101 MMOL/L (ref 96–112)
CO2 SERPL-SCNC: 26 MMOL/L (ref 20–33)
CREAT SERPL-MCNC: 1.36 MG/DL (ref 0.5–1.4)
EOSINOPHIL # BLD AUTO: 0.63 K/UL (ref 0–0.51)
EOSINOPHIL NFR BLD: 5.2 % (ref 0–6.9)
ERYTHROCYTE [DISTWIDTH] IN BLOOD BY AUTOMATED COUNT: 44.2 FL (ref 35.9–50)
GLOBULIN SER CALC-MCNC: 2.8 G/DL (ref 1.9–3.5)
GLUCOSE SERPL-MCNC: 96 MG/DL (ref 65–99)
HCT VFR BLD AUTO: 38.5 % (ref 42–52)
HGB BLD-MCNC: 12.8 G/DL (ref 14–18)
IMM GRANULOCYTES # BLD AUTO: 0.13 K/UL (ref 0–0.11)
IMM GRANULOCYTES NFR BLD AUTO: 1.1 % (ref 0–0.9)
LYMPHOCYTES # BLD AUTO: 1.51 K/UL (ref 1–4.8)
LYMPHOCYTES NFR BLD: 12.4 % (ref 22–41)
MCH RBC QN AUTO: 30.4 PG (ref 27–33)
MCHC RBC AUTO-ENTMCNC: 33.2 G/DL (ref 33.7–35.3)
MCV RBC AUTO: 91.4 FL (ref 81.4–97.8)
MONOCYTES # BLD AUTO: 1.43 K/UL (ref 0–0.85)
MONOCYTES NFR BLD AUTO: 11.7 % (ref 0–13.4)
NEUTROPHILS # BLD AUTO: 8.37 K/UL (ref 1.82–7.42)
NEUTROPHILS NFR BLD: 68.7 % (ref 44–72)
NRBC # BLD AUTO: 0 K/UL
NRBC BLD-RTO: 0 /100 WBC
PLATELET # BLD AUTO: 229 K/UL (ref 164–446)
PMV BLD AUTO: 10 FL (ref 9–12.9)
POTASSIUM SERPL-SCNC: 2.9 MMOL/L (ref 3.6–5.5)
POTASSIUM SERPL-SCNC: 2.9 MMOL/L (ref 3.6–5.5)
POTASSIUM SERPL-SCNC: 3 MMOL/L (ref 3.6–5.5)
PROT SERPL-MCNC: 5.9 G/DL (ref 6–8.2)
RBC # BLD AUTO: 4.21 M/UL (ref 4.7–6.1)
SODIUM SERPL-SCNC: 140 MMOL/L (ref 135–145)
WBC # BLD AUTO: 12.2 K/UL (ref 4.8–10.8)

## 2018-05-05 PROCEDURE — 85025 COMPLETE CBC W/AUTO DIFF WBC: CPT

## 2018-05-05 PROCEDURE — 87493 C DIFF AMPLIFIED PROBE: CPT

## 2018-05-05 PROCEDURE — A9270 NON-COVERED ITEM OR SERVICE: HCPCS | Performed by: NURSE PRACTITIONER

## 2018-05-05 PROCEDURE — 700102 HCHG RX REV CODE 250 W/ 637 OVERRIDE(OP): Performed by: HOSPITALIST

## 2018-05-05 PROCEDURE — 80053 COMPREHEN METABOLIC PANEL: CPT

## 2018-05-05 PROCEDURE — A9270 NON-COVERED ITEM OR SERVICE: HCPCS | Performed by: HOSPITALIST

## 2018-05-05 PROCEDURE — 700111 HCHG RX REV CODE 636 W/ 250 OVERRIDE (IP): Performed by: NURSE PRACTITIONER

## 2018-05-05 PROCEDURE — A9270 NON-COVERED ITEM OR SERVICE: HCPCS | Performed by: INTERNAL MEDICINE

## 2018-05-05 PROCEDURE — 700102 HCHG RX REV CODE 250 W/ 637 OVERRIDE(OP): Performed by: INTERNAL MEDICINE

## 2018-05-05 PROCEDURE — 84132 ASSAY OF SERUM POTASSIUM: CPT | Mod: 91

## 2018-05-05 PROCEDURE — 700102 HCHG RX REV CODE 250 W/ 637 OVERRIDE(OP): Performed by: NURSE PRACTITIONER

## 2018-05-05 PROCEDURE — 700105 HCHG RX REV CODE 258: Performed by: NURSE PRACTITIONER

## 2018-05-05 PROCEDURE — 74245 DX-UPPER GI-SMALL BOWEL FOLLOW THRU: CPT

## 2018-05-05 PROCEDURE — 770020 HCHG ROOM/CARE - TELE (206)

## 2018-05-05 RX ORDER — POTASSIUM CHLORIDE 7.45 MG/ML
10 INJECTION INTRAVENOUS ONCE
Status: COMPLETED | OUTPATIENT
Start: 2018-05-05 | End: 2018-05-05

## 2018-05-05 RX ORDER — POTASSIUM CHLORIDE 14.9 MG/ML
20 INJECTION INTRAVENOUS ONCE
Status: COMPLETED | OUTPATIENT
Start: 2018-05-05 | End: 2018-05-06

## 2018-05-05 RX ORDER — POTASSIUM CHLORIDE 14.9 MG/ML
20 INJECTION INTRAVENOUS ONCE
Status: COMPLETED | OUTPATIENT
Start: 2018-05-05 | End: 2018-05-05

## 2018-05-05 RX ADMIN — ROSUVASTATIN CALCIUM 20 MG: 10 TABLET, FILM COATED ORAL at 20:46

## 2018-05-05 RX ADMIN — SODIUM CHLORIDE: 9 INJECTION, SOLUTION INTRAVENOUS at 08:13

## 2018-05-05 RX ADMIN — ENOXAPARIN SODIUM 40 MG: 100 INJECTION SUBCUTANEOUS at 08:14

## 2018-05-05 RX ADMIN — POTASSIUM CHLORIDE 20 MEQ: 200 INJECTION, SOLUTION INTRAVENOUS at 14:40

## 2018-05-05 RX ADMIN — METOPROLOL TARTRATE 50 MG: 50 TABLET, FILM COATED ORAL at 08:14

## 2018-05-05 RX ADMIN — METOPROLOL TARTRATE 50 MG: 50 TABLET, FILM COATED ORAL at 20:46

## 2018-05-05 RX ADMIN — LOSARTAN POTASSIUM 50 MG: 25 TABLET, FILM COATED ORAL at 08:13

## 2018-05-05 RX ADMIN — AMIODARONE HYDROCHLORIDE 400 MG: 200 TABLET ORAL at 05:59

## 2018-05-05 RX ADMIN — POTASSIUM CHLORIDE 20 MEQ: 200 INJECTION, SOLUTION INTRAVENOUS at 08:57

## 2018-05-05 RX ADMIN — CLOPIDOGREL 75 MG: 75 TABLET, FILM COATED ORAL at 08:13

## 2018-05-05 RX ADMIN — ASPIRIN 81 MG: 81 TABLET, COATED ORAL at 08:13

## 2018-05-05 RX ADMIN — POTASSIUM CHLORIDE 10 MEQ: 7.46 INJECTION, SOLUTION INTRAVENOUS at 16:45

## 2018-05-05 RX ADMIN — POTASSIUM CHLORIDE 10 MEQ: 7.46 INJECTION, SOLUTION INTRAVENOUS at 10:59

## 2018-05-05 RX ADMIN — POTASSIUM CHLORIDE 10 MEQ: 7.46 INJECTION, SOLUTION INTRAVENOUS at 21:54

## 2018-05-05 RX ADMIN — POTASSIUM CHLORIDE 20 MEQ: 200 INJECTION, SOLUTION INTRAVENOUS at 19:01

## 2018-05-05 RX ADMIN — AMIODARONE HYDROCHLORIDE 400 MG: 200 TABLET ORAL at 18:30

## 2018-05-05 ASSESSMENT — PAIN SCALES - GENERAL
PAINLEVEL_OUTOF10: 0

## 2018-05-05 ASSESSMENT — ENCOUNTER SYMPTOMS
SHORTNESS OF BREATH: 0
NAUSEA: 0
SORE THROAT: 0
COUGH: 0
MUSCULOSKELETAL NEGATIVE: 1
VOMITING: 0
CONSTITUTIONAL NEGATIVE: 1
FEVER: 0
HEADACHES: 0
DIARRHEA: 1
ABDOMINAL PAIN: 0
DEPRESSION: 0
CHILLS: 0
ORTHOPNEA: 0
RESPIRATORY NEGATIVE: 1
DIARRHEA: 0
EYE PAIN: 0
SINUS PAIN: 0
WEAKNESS: 0
BLURRED VISION: 0
NECK PAIN: 0
BACK PAIN: 0
SENSORY CHANGE: 0
SPEECH CHANGE: 0
NERVOUS/ANXIOUS: 0
NEUROLOGICAL NEGATIVE: 1
PSYCHIATRIC NEGATIVE: 1
EYES NEGATIVE: 1
ROS GI COMMENTS: FEELING BLOATED

## 2018-05-05 NOTE — PROGRESS NOTES
Patient went into Afib, HR 120s-130s during ambulation. BP: 112/89. Patient converted back into SR with blocked PACs approximately 15 minutes after resting. OPAL Muñiz, notified. No new orders received.

## 2018-05-05 NOTE — PROGRESS NOTES
Received report from ARISTIDES Walker. Assumed care of patient. Lines and gtts verified. Patient refusing bed alarm; upself with steady gait. Patient asking for water/food; education provided about NPO status; patient verbalizes understanding.

## 2018-05-05 NOTE — PROGRESS NOTES
Pt refusing bed alarm, education provided, pt continues to refuse.  2 RN sign off with Erica IRVING.

## 2018-05-05 NOTE — PROGRESS NOTES
Pulmonary Critical Care Progress Note      Date of Admission:  4/25/2018  Date of Service:  5/5/2018  Chief Complaint:  Postoperative ventilator management for 4 V CABG    Interval Events:  24 hour interval history reviewed   - POD#8 for 4V CABG   - still has a partial ileus with barium study   - having diarrhea still   - a-fib/SR still   - -130s   - afebrile   - no chest pain   - a/ox4   - advance to clear liquids this morning   - adequate UOP with urinal overnight   - NS at 50cc/hr   - K scale this morning   - IS at 2250cc   - no CXR today   - lovenox   - WBCs of 12   - K of 3.9       Yesterday's Events:   - POD#7 for 4V CABG   - watery BMs and N/V overnight   - surgery saw and still NPO except sips of water   - Upper GI with small bowel follow through today   - no other overnight events   - SR/a-fib/SR 90-100s   - -120s   - sips of water with meds   - IS 1500cc   - no CXR   - adequate UOP with urinal   - good glucose control      Review of Systems   Constitutional: Positive for malaise/fatigue. Negative for chills and fever.   HENT: Negative for congestion, hearing loss, sinus pain and sore throat.    Eyes: Negative for blurred vision and pain.   Respiratory: Negative for cough and shortness of breath.    Cardiovascular: Negative for chest pain, orthopnea and leg swelling.   Gastrointestinal: Negative for abdominal pain, diarrhea, nausea and vomiting.        Feeling bloated   Genitourinary: Negative for frequency, hematuria and urgency.   Musculoskeletal: Negative for back pain and neck pain.   Skin: Negative.    Neurological: Negative for sensory change, speech change, weakness and headaches.   Endo/Heme/Allergies: Negative.    Psychiatric/Behavioral: Negative for depression. The patient is not nervous/anxious.        Physical Exam   Constitutional: He is oriented to person, place, and time and well-developed, well-nourished, and in no distress.   obese   HENT:   Right Ear: External ear normal.    Left Ear: External ear normal.   Nose: Nose normal.   Mouth/Throat: No oropharyngeal exudate.   Eyes: Conjunctivae and EOM are normal. Pupils are equal, round, and reactive to light. No scleral icterus.   Neck: Normal range of motion. Neck supple. No thyromegaly present.   Cardiovascular: Normal rate, regular rhythm, normal heart sounds and intact distal pulses.    No murmur heard.  Pulmonary/Chest: Effort normal and breath sounds normal. No stridor. No respiratory distress. He exhibits no tenderness.   Abdominal: Soft. He exhibits distension. He exhibits no mass. There is no tenderness. There is no guarding.   Hypoactive bowel sounds   Musculoskeletal: Normal range of motion. He exhibits no edema or tenderness.   Lymphadenopathy:     He has no cervical adenopathy.   Neurological: He is alert and oriented to person, place, and time. He has normal reflexes. No cranial nerve deficit. Gait normal.   Skin: Skin is warm and dry. No rash noted. He is not diaphoretic. No erythema.   Psychiatric: Mood, memory and judgment normal.   Flat affect noted   Nursing note and vitals reviewed.  exam is unchanged    PFSH:  No change.    Respiratory:     Pulse Oximetry: 95 %    HemoDynamics:  Pulse: 82  Blood Pressure: 107/73, NIBP: 122/69        Fluids:  Intake/Output       05/03/18 0700 - 05/04/18 0659 05/04/18 0700 - 05/05/18 0659 05/05/18 0700 - 05/06/18 0659      9137-5384 5620-5839 Total 5270-3217 6552-8190 Total 6519-7285 0353-2545 Total       Intake    P.O.  960  100 1060  300  200 500  --  -- --    P.O.  0 200 200 -- -- --    Oral Contrast -- -- -- 300 -- 300 -- -- --    I.V.  51  -- 51  500  300 800  --  -- --    Amiodarone Volume 51 -- 51 -- -- -- -- -- --    IV Piggyback Volume -- -- -- 500 -- 500 -- -- --    IV Volume (NS>) -- -- -- -- 300 300 -- -- --    Other  --  -- --  30  75 105  --  -- --    Medications (P.O./ Enteral Liquids) -- -- -- 30 75 105 -- -- --    Total Intake 0074 223 7733  -- --  --       Output    Urine  300  1300 1600  --  175 175  --  -- --    Number of Times Voided 5 x -- 5 x -- 1 x 1 x -- -- --    Urine Void (mL) (non-catheter) 300 1300 1600 - 175 175 -- -- --    Emesis  --  -- --  --  -- --  --  -- --    Emesis - Number of Times -- 4 x 4 x 2 x -- 2 x -- -- --    Stool  --  -- --  --  -- --  --  -- --    Number of Times Stooled 5 x -- 5 x 7 x 1 x 8 x -- -- --    Total Output 300 1300 1600 -- 175 175 -- -- --       Net I/O     821 1200 -183  -- -- --           Recent Labs      18   0550   SODIUM  137  137   POTASSIUM  3.2*  3.0*   CHLORIDE  96  96   CO2  33  31   BUN  26*  28*   CREATININE  1.08  1.34   MAGNESIUM  2.4   --    CALCIUM  8.2*  8.2*       GI/Nutrition:    Liver Function  Recent Labs      18   0550   ALTSGPT  37  39   ASTSGOT  28  32   ALKPHOSPHAT  68  57   TBILIRUBIN  1.7*  1.5   GLUCOSE  122*  116*       Heme:  Recent Labs      18   0550   RBC  4.21*  4.43*   HEMOGLOBIN  12.8*  13.1*   HEMATOCRIT  37.6*  39.3*   PLATELETCT  217  286       Infectious Disease:  Temp  Av.7 °C (98.1 °F)  Min: 36.2 °C (97.2 °F)  Max: 37.4 °C (99.3 °F)    Recent Labs      18   04018   0550   WBC  9.2  14.0*   NEUTSPOLYS  59.70  65.20   LYMPHOCYTES  9.10*  10.40*   MONOCYTES  25.50*  10.40   EOSINOPHILS  4.10  0.90   BASOPHILS  0.50  0.00   ASTSGOT  28  32   ALTSGPT  37  39   ALKPHOSPHAT  68  57   TBILIRUBIN  1.7*  1.5     Current Facility-Administered Medications   Medication Dose Frequency Provider Last Rate Last Dose   • NS infusion   Continuous Alisson L. Shawn, A.P.N. 50 mL/hr at 18 1055     • metoprolol (LOPRESSOR) tablet 50 mg  50 mg BID Theresa Reddy A.P.N.   50 mg at 18   • calcium carbonate (TUMS) chewable tab 500 mg  500 mg Q4HRS PRN Theresa Reddy A.P.N.   500 mg at 18 1607   • amiodarone (CORDARONE) tablet 400 mg  400 mg BID Alisson Escobar, A.P.N.   400  mg at 05/04/18 1747   • ipratropium-albuterol (DUONEB) nebulizer solution 3 mL  3 mL Q4H PRN (RT) Andres Guidry M.D.   3 mL at 04/28/18 2141   • Respiratory Care per Protocol   Continuous RT Alisson Escobar A.P.N.       • Pharmacy Consult Request ...Pain Management Review 1 Each  1 Each PRN Alisson Escobar, A.P.N.       • docusate sodium (COLACE) capsule 100 mg  100 mg QAM Alisson Escobar, A.P.N.   Stopped at 05/04/18 0900    And   • senna-docusate (PERICOLACE or SENOKOT S) 8.6-50 MG per tablet 1 Tab  1 Tab Nightly Alisson Escobar, A.P.N.   Stopped at 05/03/18 2100    And   • senna-docusate (PERICOLACE or SENOKOT S) 8.6-50 MG per tablet 1 Tab  1 Tab Q24HRS PRN Alisson Escobar, A.P.N.   1 Tab at 04/30/18 1032    And   • lactulose 20 GM/30ML solution 30 mL  30 mL Q24HRS PRN Alisson Escobar, A.P.N.   30 mL at 04/30/18 1955    And   • bisacodyl (DULCOLAX) suppository 10 mg  10 mg Q24HRS PRN Alisson Escobar, A.P.N.        And   • fleet enema 133 mL  1 Each Once PRN Alisson Escobar, A.P.N.       • enoxaparin (LOVENOX) inj 40 mg  40 mg DAILY Alisson Escobar, A.P.N.   40 mg at 05/04/18 1054   • aspirin EC (ECOTRIN) tablet 81 mg  81 mg DAILY Alisson Escobar, A.P.N.   81 mg at 05/03/18 0801   • clopidogrel (PLAVIX) tablet 75 mg  75 mg DAILY Alisson Escobar, A.P.N.   75 mg at 05/04/18 1054   • oxyCODONE immediate-release (ROXICODONE) tablet 5 mg  5 mg Q3HRS PRN Alisson Escobar, A.P.N.   5 mg at 05/03/18 1942   • oxyCODONE immediate release (ROXICODONE) tablet 10 mg  10 mg Q3HRS PRN Alisson Escobar, A.P.N.   10 mg at 05/01/18 1801   • tramadol (ULTRAM) 50 MG tablet 50 mg  50 mg Q4HRS PRN Alisson Escobar, A.P.N.   50 mg at 05/02/18 0147   • ondansetron (ZOFRAN) syringe/vial injection 4 mg  4 mg Q6HRS PRN Alisson Escobar, A.P.N.   4 mg at 05/04/18 0303    Or   • prochlorperazine (COMPAZINE) injection 10 mg  10 mg Q6HRS PRN Alisson Escobar, A.P.N.   10 mg at 05/04/18 0549    Or   • promethazine  (PHENERGAN) suppository 25 mg  25 mg Q6HRS PRN Alisson L. Shawn, A.P.N.       • acetaminophen (TYLENOL) tablet 650 mg  650 mg Q4HRS PRN Alisson L. Shawn, A.P.N.   650 mg at 04/30/18 1910    Or   • acetaminophen (TYLENOL) suppository 650 mg  650 mg Q4HRS PRN Alisson L. Shawn, A.P.N.       • mag hydrox-al hydrox-simeth (MAALOX PLUS ES or MYLANTA DS) suspension 30 mL  30 mL Q4HRS PRN Alisson L. Shawn, A.P.N.   30 mL at 05/03/18 2119   • diphenhydrAMINE (BENADRYL) tablet/capsule 25 mg  25 mg HS PRN - MR X 1 Alisson L. Shawn, A.P.N.   25 mg at 05/03/18 2243   • dextrose 50% (D50W) injection 25 mL  25 mL PRN Alisson L. Shawn, A.P.N.       • losartan (COZAAR) tablet 50 mg  50 mg DAILY Adelina Vieyra M.D.   50 mg at 05/03/18 0800   • rosuvastatin (CRESTOR) tablet 20 mg  20 mg Q EVENING Leighton Cook M.D.   20 mg at 05/04/18 2005     Last reviewed on 4/28/2018  8:25 PM by Iain An R.N.    Quality  Measures:  Labs reviewed, Medications reviewed and Radiology images reviewed  Bravo catheter: No Bravo  Central line in place: Need for access    DVT Prophylaxis: Enoxaparin (Lovenox)  DVT prophylaxis - mechanical: SCDs  Ulcer prophylaxis: Not indicated          Assessment and Plan:    Postoperative ventilator management   - extubated 4/28   - IS/PEP, mobilize   - cont RT protocols   - stop lasix today  Status post 4-vessel coronary bypass grafting - 4/27   - aspirin, Plavix, statin, BB   - [facility] protocols   - post op pain well controlled  Status post inferior STEMI   - Status post thrombolytic therapy at outside facility   - Status post emergent angioplasty of subtotally occluded mid circumflex coronary artery   - meds as above  Acute Ileus   - surgery following   - slight improvement   - advancing diet to clear liquids today   - upper gi with small bowel follow through showing ileus still   - Mg normal at 2.4   - stopped reglan   - cont stool softeners  Acute systolic heart failure   - follow I/O closely  History of  hypertension   - cont metoprolol  Hypokalemia   - Replete potassium  Hiccups   - resolved   - cont baclofen orally   - stopping reglan  AF/RVR   - still in and out of SR/a-fib but rate controlled   - stopped IV amio   - cont oral amiodarone   - cont oral metoprolol  Prophylaxis   - lovenox, scds    Will continue to follow in the ICU  [id number]

## 2018-05-05 NOTE — PROGRESS NOTES
Cardiovascular Surgery Progress Note    Name: Jae Venegas  MRN: [id number]  : 1970  Admit Date: 2018 10:33 PM  Procedure:  Procedure(s) and Anesthesia Type:     * MULTIPLE CORONARY ARTERY BYPASS ENDO VEIN HARVEST - X4, BILATERAL INTERNAL MAMMARY ARTERY HARVEST - General     * [facility] - General  8 Day Post-Op    Vitals:  Patient Vitals for the past 8 hrs:   Temp O2 Delivery O2 (LPM) Weight   18 0600 - - - 106.3 kg (234 lb 5.6 oz)   18 0400 36.2 °C (97.2 °F) Silicone Nasal Cannula 1.5 -     Temp (24hrs), Av.7 °C (98.1 °F), Min:36.2 °C (97.2 °F), Max:37.4 °C (99.3 °F)      Respiratory:    Respiration: 18, Pulse Oximetry: 95 %     Chest Tube Drains:          Fluids:    Intake/Output Summary (Last 24 hours) at 18 0823  Last data filed at 18 0600   Gross per 24 hour   Intake             1705 ml   Output              725 ml   Net              980 ml     Admit weight: Weight: 108.9 kg (240 lb)  Current weight: Weight: 106.3 kg (234 lb 5.6 oz) (18 0600)    Labs:  Recent Labs      18   0401  18   0550  18   0547   WBC  9.2  14.0*  12.2*   RBC  4.21*  4.43*  4.21*   HEMOGLOBIN  12.8*  13.1*  12.8*   HEMATOCRIT  37.6*  39.3*  38.5*   MCV  89.3  88.7  91.4   MCH  30.4  29.6  30.4   MCHC  34.0  33.3*  33.2*   RDW  42.4  42.0  44.2   PLATELETCT  217  286  229   MPV  9.9  9.7  10.0     Recent Labs      18   0401  18   0550  18   0547   NEUTSPOLYS  59.70  65.20  68.70   LYMPHOCYTES  9.10*  10.40*  12.40*   MONOCYTES  25.50*  10.40  11.70   EOSINOPHILS  4.10  0.90  5.20   BASOPHILS  0.50  0.00  0.90   RBCMORPHOLO   --   Present   --      Recent Labs      18   0401  18   0550  18   0547   SODIUM  137  137  140   POTASSIUM  3.2*  3.0*  2.9*   CHLORIDE  96  96  101   CO2  33  31  26   GLUCOSE  122*  116*  96   BUN  26*  28*  30*   CREATININE  1.08  1.34  1.36   CALCIUM  8.2*  8.2*  8.1*           Medications:  • K+ Scale: Goal of 4.5  1 Each      • metoprolol  50 mg     • amiodarone  400 mg     • docusate sodium  100 mg      And   • senna-docusate  1 Tab     • enoxaparin  40 mg     • aspirin EC  81 mg     • clopidogrel  75 mg     • losartan  50 mg     • rosuvastatin  20 mg         Exam:   Review of Systems   Constitutional: Negative.    HENT: Negative.    Eyes: Negative.    Respiratory: Negative.    Cardiovascular: Chest pain: MSK.   Gastrointestinal: Positive for diarrhea. Negative for abdominal pain, nausea and vomiting.   Genitourinary: Negative.    Musculoskeletal: Negative.    Skin: Negative.    Neurological: Negative.    Endo/Heme/Allergies: Negative.    Psychiatric/Behavioral: Negative.        Physical Exam   Constitutional: He is oriented to person, place, and time. He appears well-developed and well-nourished.   HENT:   Head: Normocephalic.   Eyes: Pupils are equal, round, and reactive to light.   Neck: Normal range of motion. No JVD present.   Cardiovascular: Normal rate, regular rhythm and normal heart sounds.    Pulmonary/Chest: Effort normal. He has decreased breath sounds in the right lower field and the left lower field.   Abdominal: He exhibits distension. Bowel sounds are increased. There is tenderness in the right upper quadrant. There is no guarding.   Musculoskeletal: Normal range of motion. He exhibits edema.   Neurological: He is alert and oriented to person, place, and time.   Skin: Skin is warm and dry.   prevena to chest, EVH sites CDI   Psychiatric: He has a normal mood and affect. His behavior is normal. Judgment and thought content normal.       Quality Measures:   Quality-Core Measures   Bravo catheter::  No Bravo  Central line in place:  Need for access  DVT prophylaxis pharmacological::  Contraindicated - High bleeding risk  DVT prophylaxis - mechanical:  SCDs  Ulcer Prophylaxis::  Yes  Assessed for rehabilitation services:  Patient returned to prior level of function, rehabilitation not indicated at this  time    Assessment/Plan:  POD 1 HDS on low dose epi.  NSR. Vented, ABGs good, weaning to extubated this AM. CT output min, no airlekas.  Will keep CTs/gill today.  Plan to start diuresis once BP stable off epi.  Continuing ABX x 72 hours for bilateral ELIANE harvests.  CPM.  POD 2 HDS< NSR (afib with RVR this AM, converted on amio gtt protocol). Pain issues, shallow breathing.  CT output min, no airleak.  Passing gas, no BM.  PLAN:  Continue amio protocol. Add PO dilaudid.  Stress IS use.  DC mediastinal CTs and gill.  Diurese.   POD 3 HDS, Afib for 40 minutes this am- now SR- on amio gtt, CT bilat pleural tubes- 50/70 cc, sedate- dc ms contin, inc diuresis, amb, enc IS  POD 4 HDS, NSR on amio, min output blakes.  ABD distention, hyper/distant bowel sounds, RUQ pain.  States passing gas, but no BM.  PLAN:  DC blakes.  Continue diuresis.  ABD xray now.  Add reglan. Limit narcotics. AMB/IS.  POD 5 HDS/HTN, NSR/ST, short bout of afib again with ambulation rates to 140s, currently NSR on amio gtt.  States feels a little better this AM.  Had large loose/watery stool this AM.  ABD remains very distended, hypobowel sounds.  PLAN:  Keep NPO, gen surg following.  Continue diuresis, decrease amount since NPO.  Decrease amio gtt to 0.5 mg/min.  Increase BB. AMB/IS.    POD 6 HDS, SR/ST- inc beta blocker- change amio to PO, ileus- tolerating clear liquids- surgery following, replace K+,  amb, enc IS   POD 7 HDS< NSR, n/v overnight, abd remains distended, hypo bowel sounds.  K low, creatinine up slightly.  PLAN;  Upper GI series this AM.  Replace K.  DC lasix, start maintenance fluids while NPO.  CPM.  POD 8 HDSm SR, no n/v overnight, episode of diarrhea- gen surgery following, hypokalemia- start k-scale, Cr stable, amb, enc IS    Patient seen, examined and plan reviewed with midlevel provider. I agree with the plan.      Active Hospital Problems    Diagnosis   • STEMI (ST elevation myocardial infarction) ([facility]) [I21.3]      Priority: High   • HTN (hypertension) [I10]

## 2018-05-05 NOTE — PROGRESS NOTES
Patient refuses bed alarm. Patient educated about increased risk of falls; still refuses. Patient upself and steady on his feet.

## 2018-05-05 NOTE — CARE PLAN
Problem: Post Op Day 4 CABG/Heart Valve Replacement  Goal: Optimal care of the Post Op CABG/Heart Valve replacement Post Op Day 4    Intervention: Daily Weights  106.3kg stand up scale  Intervention: Shower daily and clean incisions twice daily with soap and water  completed  Intervention: Up in chair for all meals  Up to chair for breakfast  Intervention: Ambulate, increasing the distance each time x 3 and before bed  Ambulated around unit once; went into Afib.  Intervention: IS q 1 hour while awake and record best IS volume  IS: 1750

## 2018-05-05 NOTE — PROGRESS NOTES
Report received from RNNayla.  Will take over care at this time. Pt aware of change in nurses.  No complaints at this time.  Call light in reach.  Patient resting comfortably in chair.

## 2018-05-06 LAB
ALBUMIN SERPL BCP-MCNC: 2.9 G/DL (ref 3.2–4.9)
ALBUMIN/GLOB SERPL: 1.2 G/DL
ALP SERPL-CCNC: 58 U/L (ref 30–99)
ALT SERPL-CCNC: 40 U/L (ref 2–50)
ANION GAP SERPL CALC-SCNC: 7 MMOL/L (ref 0–11.9)
ANION GAP SERPL CALC-SCNC: 8 MMOL/L (ref 0–11.9)
AST SERPL-CCNC: 25 U/L (ref 12–45)
BASOPHILS # BLD AUTO: 0.4 % (ref 0–1.8)
BASOPHILS # BLD: 0.05 K/UL (ref 0–0.12)
BILIRUB SERPL-MCNC: 1.4 MG/DL (ref 0.1–1.5)
BUN SERPL-MCNC: 20 MG/DL (ref 8–22)
BUN SERPL-MCNC: 22 MG/DL (ref 8–22)
CALCIUM SERPL-MCNC: 7.7 MG/DL (ref 8.5–10.5)
CALCIUM SERPL-MCNC: 7.7 MG/DL (ref 8.5–10.5)
CHLORIDE SERPL-SCNC: 103 MMOL/L (ref 96–112)
CHLORIDE SERPL-SCNC: 105 MMOL/L (ref 96–112)
CO2 SERPL-SCNC: 24 MMOL/L (ref 20–33)
CO2 SERPL-SCNC: 26 MMOL/L (ref 20–33)
CREAT SERPL-MCNC: 1.25 MG/DL (ref 0.5–1.4)
CREAT SERPL-MCNC: 1.3 MG/DL (ref 0.5–1.4)
EOSINOPHIL # BLD AUTO: 0.59 K/UL (ref 0–0.51)
EOSINOPHIL NFR BLD: 4.4 % (ref 0–6.9)
ERYTHROCYTE [DISTWIDTH] IN BLOOD BY AUTOMATED COUNT: 42.2 FL (ref 35.9–50)
GLOBULIN SER CALC-MCNC: 2.4 G/DL (ref 1.9–3.5)
GLUCOSE SERPL-MCNC: 102 MG/DL (ref 65–99)
GLUCOSE SERPL-MCNC: 92 MG/DL (ref 65–99)
HCT VFR BLD AUTO: 34.5 % (ref 42–52)
HGB BLD-MCNC: 11.6 G/DL (ref 14–18)
IMM GRANULOCYTES # BLD AUTO: 0.17 K/UL (ref 0–0.11)
IMM GRANULOCYTES NFR BLD AUTO: 1.3 % (ref 0–0.9)
LYMPHOCYTES # BLD AUTO: 1.28 K/UL (ref 1–4.8)
LYMPHOCYTES NFR BLD: 9.5 % (ref 22–41)
MAGNESIUM SERPL-MCNC: 2.4 MG/DL (ref 1.5–2.5)
MCH RBC QN AUTO: 29.7 PG (ref 27–33)
MCHC RBC AUTO-ENTMCNC: 33.6 G/DL (ref 33.7–35.3)
MCV RBC AUTO: 88.5 FL (ref 81.4–97.8)
MONOCYTES # BLD AUTO: 1.32 K/UL (ref 0–0.85)
MONOCYTES NFR BLD AUTO: 9.8 % (ref 0–13.4)
NEUTROPHILS # BLD AUTO: 10.07 K/UL (ref 1.82–7.42)
NEUTROPHILS NFR BLD: 74.6 % (ref 44–72)
NRBC # BLD AUTO: 0 K/UL
NRBC BLD-RTO: 0 /100 WBC
PLATELET # BLD AUTO: 207 K/UL (ref 164–446)
PMV BLD AUTO: 10.3 FL (ref 9–12.9)
POTASSIUM SERPL-SCNC: 3.1 MMOL/L (ref 3.6–5.5)
POTASSIUM SERPL-SCNC: 3.1 MMOL/L (ref 3.6–5.5)
POTASSIUM SERPL-SCNC: 3.4 MMOL/L (ref 3.6–5.5)
PROT SERPL-MCNC: 5.3 G/DL (ref 6–8.2)
RBC # BLD AUTO: 3.9 M/UL (ref 4.7–6.1)
SODIUM SERPL-SCNC: 136 MMOL/L (ref 135–145)
SODIUM SERPL-SCNC: 137 MMOL/L (ref 135–145)
WBC # BLD AUTO: 13.5 K/UL (ref 4.8–10.8)

## 2018-05-06 PROCEDURE — 700102 HCHG RX REV CODE 250 W/ 637 OVERRIDE(OP): Performed by: NURSE PRACTITIONER

## 2018-05-06 PROCEDURE — 700111 HCHG RX REV CODE 636 W/ 250 OVERRIDE (IP): Performed by: NURSE PRACTITIONER

## 2018-05-06 PROCEDURE — 700102 HCHG RX REV CODE 250 W/ 637 OVERRIDE(OP): Performed by: HOSPITALIST

## 2018-05-06 PROCEDURE — 80053 COMPREHEN METABOLIC PANEL: CPT

## 2018-05-06 PROCEDURE — 80048 BASIC METABOLIC PNL TOTAL CA: CPT

## 2018-05-06 PROCEDURE — 83735 ASSAY OF MAGNESIUM: CPT

## 2018-05-06 PROCEDURE — 700102 HCHG RX REV CODE 250 W/ 637 OVERRIDE(OP): Performed by: INTERNAL MEDICINE

## 2018-05-06 PROCEDURE — 770020 HCHG ROOM/CARE - TELE (206)

## 2018-05-06 PROCEDURE — 84132 ASSAY OF SERUM POTASSIUM: CPT

## 2018-05-06 PROCEDURE — A9270 NON-COVERED ITEM OR SERVICE: HCPCS | Performed by: HOSPITALIST

## 2018-05-06 PROCEDURE — A9270 NON-COVERED ITEM OR SERVICE: HCPCS | Performed by: INTERNAL MEDICINE

## 2018-05-06 PROCEDURE — A9270 NON-COVERED ITEM OR SERVICE: HCPCS | Performed by: NURSE PRACTITIONER

## 2018-05-06 PROCEDURE — 85025 COMPLETE CBC W/AUTO DIFF WBC: CPT

## 2018-05-06 PROCEDURE — 700111 HCHG RX REV CODE 636 W/ 250 OVERRIDE (IP): Performed by: THORACIC SURGERY (CARDIOTHORACIC VASCULAR SURGERY)

## 2018-05-06 RX ORDER — POTASSIUM CHLORIDE 20 MEQ/1
40 TABLET, EXTENDED RELEASE ORAL 2 TIMES DAILY
Status: DISCONTINUED | OUTPATIENT
Start: 2018-05-06 | End: 2018-05-08 | Stop reason: HOSPADM

## 2018-05-06 RX ORDER — POTASSIUM CHLORIDE 7.45 MG/ML
10 INJECTION INTRAVENOUS ONCE
Status: COMPLETED | OUTPATIENT
Start: 2018-05-06 | End: 2018-05-06

## 2018-05-06 RX ORDER — POTASSIUM CHLORIDE 7.45 MG/ML
10 INJECTION INTRAVENOUS
Status: COMPLETED | OUTPATIENT
Start: 2018-05-06 | End: 2018-05-06

## 2018-05-06 RX ORDER — POTASSIUM CHLORIDE 14.9 MG/ML
20 INJECTION INTRAVENOUS ONCE
Status: COMPLETED | OUTPATIENT
Start: 2018-05-06 | End: 2018-05-06

## 2018-05-06 RX ORDER — POTASSIUM CHLORIDE 20 MEQ/1
40 TABLET, EXTENDED RELEASE ORAL ONCE
Status: COMPLETED | OUTPATIENT
Start: 2018-05-06 | End: 2018-05-06

## 2018-05-06 RX ADMIN — POTASSIUM CHLORIDE 40 MEQ: 1500 TABLET, EXTENDED RELEASE ORAL at 11:54

## 2018-05-06 RX ADMIN — METOPROLOL TARTRATE 50 MG: 50 TABLET, FILM COATED ORAL at 21:02

## 2018-05-06 RX ADMIN — ENOXAPARIN SODIUM 40 MG: 100 INJECTION SUBCUTANEOUS at 07:49

## 2018-05-06 RX ADMIN — LOSARTAN POTASSIUM 50 MG: 25 TABLET, FILM COATED ORAL at 07:48

## 2018-05-06 RX ADMIN — POTASSIUM CHLORIDE 10 MEQ: 7.46 INJECTION, SOLUTION INTRAVENOUS at 03:00

## 2018-05-06 RX ADMIN — POTASSIUM CHLORIDE 40 MEQ: 1500 TABLET, EXTENDED RELEASE ORAL at 21:01

## 2018-05-06 RX ADMIN — AMIODARONE HYDROCHLORIDE 400 MG: 200 TABLET ORAL at 18:19

## 2018-05-06 RX ADMIN — POTASSIUM CHLORIDE 40 MEQ: 1500 TABLET, EXTENDED RELEASE ORAL at 13:44

## 2018-05-06 RX ADMIN — ASPIRIN 81 MG: 81 TABLET, COATED ORAL at 07:48

## 2018-05-06 RX ADMIN — METOPROLOL TARTRATE 50 MG: 50 TABLET, FILM COATED ORAL at 07:54

## 2018-05-06 RX ADMIN — POTASSIUM CHLORIDE 10 MEQ: 7.46 INJECTION, SOLUTION INTRAVENOUS at 02:00

## 2018-05-06 RX ADMIN — CLOPIDOGREL 75 MG: 75 TABLET, FILM COATED ORAL at 07:48

## 2018-05-06 RX ADMIN — POTASSIUM CHLORIDE 20 MEQ: 200 INJECTION, SOLUTION INTRAVENOUS at 07:49

## 2018-05-06 RX ADMIN — POTASSIUM CHLORIDE 10 MEQ: 7.46 INJECTION, SOLUTION INTRAVENOUS at 01:21

## 2018-05-06 RX ADMIN — AMIODARONE HYDROCHLORIDE 400 MG: 200 TABLET ORAL at 05:41

## 2018-05-06 RX ADMIN — ROSUVASTATIN CALCIUM 20 MG: 10 TABLET, FILM COATED ORAL at 21:02

## 2018-05-06 RX ADMIN — POTASSIUM CHLORIDE 10 MEQ: 7.46 INJECTION, SOLUTION INTRAVENOUS at 10:34

## 2018-05-06 ASSESSMENT — PATIENT HEALTH QUESTIONNAIRE - PHQ9
1. LITTLE INTEREST OR PLEASURE IN DOING THINGS: NOT AT ALL
2. FEELING DOWN, DEPRESSED, IRRITABLE, OR HOPELESS: NOT AT ALL
SUM OF ALL RESPONSES TO PHQ9 QUESTIONS 1 AND 2: 0

## 2018-05-06 ASSESSMENT — ENCOUNTER SYMPTOMS
ABDOMINAL PAIN: 0
MUSCULOSKELETAL NEGATIVE: 1
COUGH: 0
RESPIRATORY NEGATIVE: 1
SHORTNESS OF BREATH: 0
EYE PAIN: 0
DOUBLE VISION: 0
NECK PAIN: 0
SORE THROAT: 0
BLURRED VISION: 0
PSYCHIATRIC NEGATIVE: 1
CONSTIPATION: 0
CHILLS: 0
VOMITING: 0
NEUROLOGICAL NEGATIVE: 1
BLOOD IN STOOL: 0
CONSTITUTIONAL NEGATIVE: 1
WEAKNESS: 0
DEPRESSION: 0
FEVER: 0
EYES NEGATIVE: 1
NAUSEA: 0
BACK PAIN: 0

## 2018-05-06 ASSESSMENT — PAIN SCALES - GENERAL
PAINLEVEL_OUTOF10: 0

## 2018-05-06 NOTE — PROGRESS NOTES
"S:  48 y.o.male s/p coronary artery bypass with postoperative ileus. Overnight patient had more formed stool and tolerated clear liquids.  He denies anorexia.    O:  Blood pressure 126/67, pulse 65, temperature 36.2 °C (97.2 °F), resp. rate (!) 25, height 1.854 m (6' 1\"), weight 108.4 kg (238 lb 15.7 oz), SpO2 93 %.  I/O last 3 completed shifts:  In: 1090.2 [P.O.:600; I.V.:490.2]  Out: 5340 [Urine:5250]  Recent Labs      05/04/18   0550  05/05/18   0547   05/05/18   1740  05/06/18   0005  05/06/18   0545   SODIUM  137  140   --    --    --   136   POTASSIUM  3.0*  2.9*   < >  3.0*  3.1*  3.1*   CHLORIDE  96  101   --    --    --   103   CO2  31  26   --    --    --   26   GLUCOSE  116*  96   --    --    --   102*   BUN  28*  30*   --    --    --   22   CREATININE  1.34  1.36   --    --    --   1.30   CALCIUM  8.2*  8.1*   --    --    --   7.7*    < > = values in this interval not displayed.     Recent Labs      05/04/18   0550  05/05/18   0547  05/06/18   0545   WBC  14.0*  12.2*  13.5*   RBC  4.43*  4.21*  3.90*   HEMOGLOBIN  13.1*  12.8*  11.6*   HEMATOCRIT  39.3*  38.5*  34.5*   MCV  88.7  91.4  88.5   MCH  29.6  30.4  29.7   MCHC  33.3*  33.2*  33.6*   RDW  42.0  44.2  42.2   PLATELETCT  286  229  207   MPV  9.7  10.0  10.3       Alert and Oriented x3, No Acute Distress  Normal Respiratory Effort  Abdomen softer, minimal distention without tenderness, rigidity or guarding  Incisions/Bandages clean/dry/intact  Extremities warm and well perfused      A/P: Postoperative ileus versus obstruction  Pain control with minimal narcotics  Continue to replace potassium  Ambulate tid and ad mishel  Advance diet to cardiac healthy  DC planning per primary  Stuart Sanabria MD PhD  [facility]  Colon and Rectal Surgeon  [phone]    "

## 2018-05-06 NOTE — CARE PLAN
Problem: Post Op Day 4 CABG/Heart Valve Replacement  Goal: Optimal care of the Post Op CABG/Heart Valve replacement Post Op Day 4    Intervention: Daily Weights  Completed    Intervention: Shower daily and clean incisions twice daily with soap and water  Will complete on dayshift.     Intervention: Up in chair for all meals  Complete    Intervention: Ambulate, increasing the distance each time x 3 and before bed  1000ft x 2.   Intervention: IS q 1 hour while awake and record best IS volume  2200 IS  Intervention: Consider removal of gill, chest tube and pacer wire if not already done  N/A

## 2018-05-06 NOTE — PROGRESS NOTES
Report received at bedside from Alisson IRVING, assumed care of patient. Patient A&O x 4 with a flat affect, however there are no signs of distress noted. All LDAs assessed. Discussed POC with patient and all questions answered at this time. Patient denies pain at this time. Bed in lowest position, upper side rails up. Non-skid socks in place. Call light within reach. Personal possessions in reach. Will continue to monitor pt status.

## 2018-05-06 NOTE — PROGRESS NOTES
Cardiovascular Surgery Progress Note    Name: Jae Venegas  MRN: [id number]  : 1970  Admit Date: 2018 10:33 PM  Procedure:  Procedure(s) and Anesthesia Type:     * MULTIPLE CORONARY ARTERY BYPASS ENDO VEIN HARVEST - X4, BILATERAL INTERNAL MAMMARY ARTERY HARVEST - General     * [place] - General  9 Day Post-Op    Vitals:  Patient Vitals for the past 8 hrs:   Temp SpO2 O2 Delivery Heart Rate (Monitored) Resp NIBP Weight   18 0400 36.2 °C (97.2 °F) 93 % None (Room Air) - - 124/70 108.4 kg (238 lb 15.7 oz)   18 0300 - - - 82 (!) 25 124/70 -     Temp (24hrs), Av.3 °C (97.3 °F), Min:36.2 °C (97.1 °F), Max:36.3 °C (97.4 °F)      Respiratory:    Respiration: (!) 25, Pulse Oximetry: 93 %     Chest Tube Drains:          Fluids:    Intake/Output Summary (Last 24 hours) at 18 1011  Last data filed at 18 0600   Gross per 24 hour   Intake             1680 ml   Output              950 ml   Net              730 ml     Admit weight: Weight: 108.9 kg (240 lb)  Current weight: Weight: 108.4 kg (238 lb 15.7 oz) (18 0400)    Labs:  Recent Labs      18   0550  18   0547  18   0545   WBC  14.0*  12.2*  13.5*   RBC  4.43*  4.21*  3.90*   HEMOGLOBIN  13.1*  12.8*  11.6*   HEMATOCRIT  39.3*  38.5*  34.5*   MCV  88.7  91.4  88.5   MCH  29.6  30.4  29.7   MCHC  33.3*  33.2*  33.6*   RDW  42.0  44.2  42.2   PLATELETCT  286  229  207   MPV  9.7  10.0  10.3     Recent Labs      18   0550  18   0547  18   0545   NEUTSPOLYS  65.20  68.70  74.60*   LYMPHOCYTES  10.40*  12.40*  9.50*   MONOCYTES  10.40  11.70  9.80   EOSINOPHILS  0.90  5.20  4.40   BASOPHILS  0.00  0.90  0.40   RBCMORPHOLO  Present   --    --      Recent Labs      18   0550  18   0547   18   1740  18   0005  18   0545   SODIUM  137  140   --    --    --   136   POTASSIUM  3.0*  2.9*   < >  3.0*  3.1*  3.1*   CHLORIDE  96  101   --    --    --   103   CO2  31  26   --    --    --    26   GLUCOSE  116*  96   --    --    --   102*   BUN  28*  30*   --    --    --   22   CREATININE  1.34  1.36   --    --    --   1.30   CALCIUM  8.2*  8.1*   --    --    --   7.7*    < > = values in this interval not displayed.           Medications:  • potassium chloride (KCL-CENTRAL) IV *Administer in ICU only*  10 mEq     • K+ Scale: Goal of 4.5  1 Each     • metoprolol  50 mg     • amiodarone  400 mg     • enoxaparin  40 mg     • aspirin EC  81 mg     • clopidogrel  75 mg     • losartan  50 mg     • rosuvastatin  20 mg         Exam:   Review of Systems   Constitutional: Negative.    HENT: Negative.    Eyes: Negative.    Respiratory: Negative.    Cardiovascular: Chest pain: MSK.   Gastrointestinal: Negative for abdominal pain, nausea and vomiting.   Genitourinary: Negative.    Musculoskeletal: Negative.    Skin: Negative.    Neurological: Negative.    Endo/Heme/Allergies: Negative.    Psychiatric/Behavioral: Negative.        Physical Exam   Constitutional: He is oriented to person, place, and time. He appears well-developed and well-nourished.   HENT:   Head: Normocephalic.   Eyes: Pupils are equal, round, and reactive to light.   Neck: Normal range of motion. No JVD present.   Cardiovascular: Normal rate, regular rhythm and normal heart sounds.    Pulmonary/Chest: Effort normal. He has decreased breath sounds in the right lower field and the left lower field.   Abdominal: Soft. He exhibits no distension. Bowel sounds are increased. There is tenderness in the right upper quadrant. There is no guarding.   Musculoskeletal: Normal range of motion.   Neurological: He is alert and oriented to person, place, and time.   Skin: Skin is warm and dry.   prevena to chest, EVH sites CDI   Psychiatric: He has a normal mood and affect. His behavior is normal. Judgment and thought content normal.       Quality Measures:   Quality-Core Measures   Bravo catheter::  No Bravo  Central line in place:  Need for access  DVT  prophylaxis pharmacological::  Contraindicated - High bleeding risk  DVT prophylaxis - mechanical:  SCDs  Ulcer Prophylaxis::  Yes  Assessed for rehabilitation services:  Patient returned to prior level of function, rehabilitation not indicated at this time    Assessment/Plan:  POD 1 HDS on low dose epi.  NSR. Vented, ABGs good, weaning to extubated this AM. CT output min, no airlekas.  Will keep CTs/gill today.  Plan to start diuresis once BP stable off epi.  Continuing ABX x 72 hours for bilateral ELIANE harvests.  CPM.  POD 2 HDS< NSR (afib with RVR this AM, converted on amio gtt protocol). Pain issues, shallow breathing.  CT output min, no airleak.  Passing gas, no BM.  PLAN:  Continue amio protocol. Add PO dilaudid.  Stress IS use.  DC mediastinal CTs and gill.  Diurese.   POD 3 HDS, Afib for 40 minutes this am- now SR- on amio gtt, CT bilat pleural tubes- 50/70 cc, sedate- dc ms contin, inc diuresis, amb, enc IS  POD 4 HDS, NSR on amio, min output blakes.  ABD distention, hyper/distant bowel sounds, RUQ pain.  States passing gas, but no BM.  PLAN:  DC blakes.  Continue diuresis.  ABD xray now.  Add reglan. Limit narcotics. AMB/IS.  POD 5 HDS/HTN, NSR/ST, short bout of afib again with ambulation rates to 140s, currently NSR on amio gtt.  States feels a little better this AM.  Had large loose/watery stool this AM.  ABD remains very distended, hypobowel sounds.  PLAN:  Keep NPO, gen surg following.  Continue diuresis, decrease amount since NPO.  Decrease amio gtt to 0.5 mg/min.  Increase BB. AMB/IS.    POD 6 HDS, SR/ST- inc beta blocker- change amio to PO, ileus- tolerating clear liquids- surgery following, replace K+,  amb, enc IS   POD 7 HDS< NSR, n/v overnight, abd remains distended, hypo bowel sounds.  K low, creatinine up slightly.  PLAN;  Upper GI series this AM.  Replace K.  DC lasix, start maintenance fluids while NPO.  CPM.  POD 8 HDS SR, no n/v overnight, episode of diarrhea- gen surgery following,  hypokalemia- start k-scale, Cr stable, amb, enc IS  POD 9 HDS, SR, ileus- formed stool last night- getting reg diet today- gen surgery following, hypokalemia- on k+ scale- add oral potassium, Cr stable, planning flight home tues/wed if tolerates diet    Patient seen, examined and plan reviewed with midlevel provider. I agree with the plan.      Active Hospital Problems    Diagnosis   • STEMI (ST elevation myocardial infarction) ([facility]) [I21.3]     Priority: High   • HTN (hypertension) [I10]

## 2018-05-06 NOTE — PROGRESS NOTES
Pulmonary Critical Care Progress Note      Date of Admission:  4/25/2018  Date of Service:  5/6/2018  Chief Complaint:  Postoperative ventilator management for 4 V CABG    Interval Events:  24 hour interval history reviewed   - POD#9 for 4V CABG   - no events overnight   - a/ox4   - SR 60-90s   - SBPs 100-120   - BM this morning and loose   - ok UOP iwth urinal   - central in place   - room air   - no CXR today   - K at 3.1    Yesterday's Events:    - POD#8 for 4V CABG   - still has a partial ileus with barium study   - having diarrhea still   - a-fib/SR still   - -130s   - afebrile   - no chest pain   - a/ox4   - advance to clear liquids this morning   - adequate UOP with urinal overnight   - NS at 50cc/hr   - K scale this morning   - IS at 2250cc   - no CXR today   - lovenox   - WBCs of 12   - K of 3.9      Review of Systems   Constitutional: Positive for malaise/fatigue. Negative for chills and fever.        Bored and tired of being in the icu   HENT: Negative for congestion, ear pain, hearing loss and sore throat.    Eyes: Negative for blurred vision, double vision and pain.   Respiratory: Negative for cough and shortness of breath.    Cardiovascular: Negative for chest pain and leg swelling.   Gastrointestinal: Negative for abdominal pain, blood in stool, constipation and nausea.   Genitourinary: Negative for frequency, hematuria and urgency.   Musculoskeletal: Negative for back pain and neck pain.   Skin: Negative.    Neurological: Negative.  Negative for weakness.   Endo/Heme/Allergies: Negative.    Psychiatric/Behavioral: Negative for depression.       Physical Exam   Constitutional: He is oriented to person, place, and time and well-developed, well-nourished, and in no distress.   obese   HENT:   Right Ear: External ear normal.   Left Ear: External ear normal.   Nose: Nose normal.   Mouth/Throat: Oropharynx is clear and moist. No oropharyngeal exudate.   Eyes: Conjunctivae and EOM are normal. Pupils  are equal, round, and reactive to light. No scleral icterus.   Neck: Normal range of motion. Neck supple. No thyromegaly present.   Cardiovascular: Normal rate, regular rhythm, normal heart sounds and intact distal pulses.    No murmur heard.  Pulmonary/Chest: Effort normal and breath sounds normal. No stridor. No respiratory distress. He exhibits no tenderness.   Abdominal: Soft. He exhibits no distension and no mass. There is no tenderness.   Hypoactive bowel sounds   Musculoskeletal: Normal range of motion. He exhibits no edema or tenderness.   Lymphadenopathy:     He has no cervical adenopathy.   Neurological: He is alert and oriented to person, place, and time. He has normal reflexes. No cranial nerve deficit.   Skin: Skin is warm and dry. No rash noted. He is not diaphoretic.   Psychiatric: Mood, memory, affect and judgment normal.   Nursing note and vitals reviewed.      PFSH:  No change.    Respiratory:     Pulse Oximetry: 93 %    HemoDynamics:  Pulse: 65, Heart Rate (Monitored): 82  Blood Pressure: 126/67, NIBP: 124/70        Fluids:  Intake/Output       05/04/18 0700 - 05/05/18 0659 05/05/18 0700 - 05/06/18 0659 05/06/18 0700 - 05/07/18 0659      7930-2320 2538-5339 Total 4349-2412 2115-4229 Total 0521-5650 6047-2731 Total       Intake    P.O.  300  200 500  100  700 800  --  -- --    P.O. 0 200 200 100 700 800 -- -- --    Oral Contrast 300 -- 300 -- -- -- -- -- --    I.V.  500  600 1100  400  500 900  --  -- --    IV Piggyback Volume 500 -- 500 200 500 700 -- -- --    IV Volume (NS>) -- 600 600 200 -- 200 -- -- --    Other  30  75 105  --  -- --  --  -- --    Medications (P.O./ Enteral Liquids) 30 75 105 -- -- -- -- -- --    Total Intake  500 1200 1700 -- -- --       Output    Urine  --  725 725  250  500 750  --  -- --    Number of Times Voided -- 1 x 1 x -- 3 x 3 x -- -- --    Urine Void (mL) (non-catheter) -- 728 262 543 791 750 -- -- --    Emesis  --  -- --  --  -- --  --  -- --    Emesis  - Number of Times 2 x -- 2 x -- -- -- -- -- --    Stool  --  -- --  --  -- --  --  -- --    Number of Times Stooled 7 x 2 x 9 x 1 x 3 x 4 x -- -- --    Total Output -- 725 725 250 500 750 -- -- --       Net I/O     830 150 980 250 700 950 -- -- --        Weight: 108.4 kg (238 lb 15.7 oz)  Recent Labs      18   0550  18   0547  18   1305  18   1740  18   0005   SODIUM  137  140   --    --    --    POTASSIUM  3.0*  2.9*  2.9*  3.0*  3.1*   CHLORIDE  96  101   --    --    --    CO2  31  26   --    --    --    BUN  28*  30*   --    --    --    CREATININE  1.34  1.36   --    --    --    CALCIUM  8.2*  8.1*   --    --    --        GI/Nutrition:    Liver Function  Recent Labs      18   0550  18   0547   ALTSGPT  39  45   ASTSGOT  32  32   ALKPHOSPHAT  57  65   TBILIRUBIN  1.5  1.6*   GLUCOSE  116*  96       Heme:  Recent Labs      18   0550  18   0547  18   0545   RBC  4.43*  4.21*  3.90*   HEMOGLOBIN  13.1*  12.8*  11.6*   HEMATOCRIT  39.3*  38.5*  34.5*   PLATELETCT  286  229  207       Infectious Disease:  Temp  Av.3 °C (97.3 °F)  Min: 36.2 °C (97.1 °F)  Max: 36.3 °C (97.4 °F)    Recent Labs      18   0550  18   0547  18   0545   WBC  14.0*  12.2*  13.5*   NEUTSPOLYS  65.20  68.70  74.60*   LYMPHOCYTES  10.40*  12.40*  9.50*   MONOCYTES  10.40  11.70  9.80   EOSINOPHILS  0.90  5.20  4.40   BASOPHILS  0.00  0.90  0.40   ASTSGOT  32  32   --    ALTSGPT  39  45   --    ALKPHOSPHAT  57  65   --    TBILIRUBIN  1.5  1.6*   --      Current Facility-Administered Medications   Medication Dose Frequency Provider Last Rate Last Dose   • K+ Scale: Goal of 4.5  1 Each Q6HRS Theresa Reddy, A.P.N.       • NS infusion   Continuous Alisson Escobar, A.P.N. 50 mL/hr at 18 0813     • metoprolol (LOPRESSOR) tablet 50 mg  50 mg BID Theresa Reddy, A.P.N.   50 mg at 18   • calcium carbonate (TUMS) chewable tab 500 mg  500 mg Q4HRS PRN  Theresa Reddy, A.P.N.   500 mg at 05/03/18 2357   • amiodarone (CORDARONE) tablet 400 mg  400 mg BID Alisson Escobar, A.P.N.   400 mg at 05/06/18 0541   • ipratropium-albuterol (DUONEB) nebulizer solution 3 mL  3 mL Q4H PRN (RT) Andres Guidry M.D.   3 mL at 04/28/18 2141   • Respiratory Care per Protocol   Continuous RT Alisson Escobar, A.P.N.       • Pharmacy Consult Request ...Pain Management Review 1 Each  1 Each PRN Alisson Escobar, A.P.N.       • enoxaparin (LOVENOX) inj 40 mg  40 mg DAILY Alisson Escobar, A.P.N.   40 mg at 05/05/18 0814   • aspirin EC (ECOTRIN) tablet 81 mg  81 mg DAILY Alisson Escobar, A.P.N.   81 mg at 05/05/18 0813   • clopidogrel (PLAVIX) tablet 75 mg  75 mg DAILY Alisson Escobar, A.P.N.   75 mg at 05/05/18 0813   • oxyCODONE immediate-release (ROXICODONE) tablet 5 mg  5 mg Q3HRS PRN Alisson Escobar, A.P.N.   5 mg at 05/03/18 1942   • oxyCODONE immediate release (ROXICODONE) tablet 10 mg  10 mg Q3HRS PRN Alisson Escobar, A.P.N.   10 mg at 05/01/18 1801   • tramadol (ULTRAM) 50 MG tablet 50 mg  50 mg Q4HRS PRN Alisson Escobar, A.P.N.   50 mg at 05/02/18 0147   • ondansetron (ZOFRAN) syringe/vial injection 4 mg  4 mg Q6HRS PRN Alisson Escobra, A.P.N.   4 mg at 05/04/18 0303    Or   • prochlorperazine (COMPAZINE) injection 10 mg  10 mg Q6HRS PRN Alisson Escobar, A.P.N.   10 mg at 05/04/18 0549    Or   • promethazine (PHENERGAN) suppository 25 mg  25 mg Q6HRS PRN Alisson Escobar, A.P.N.       • acetaminophen (TYLENOL) tablet 650 mg  650 mg Q4HRS PRN Alisson Montanat, A.P.N.   650 mg at 04/30/18 1910    Or   • acetaminophen (TYLENOL) suppository 650 mg  650 mg Q4HRS PRN Alisson Escobar, A.P.N.       • mag hydrox-al hydrox-simeth (MAALOX PLUS ES or MYLANTA DS) suspension 30 mL  30 mL Q4HRS PRN Alisson Escobar, A.P.N.   30 mL at 05/03/18 2119   • diphenhydrAMINE (BENADRYL) tablet/capsule 25 mg  25 mg HS PRN - MR X 1 Alisson Montanat, A.P.N.   25 mg at 05/03/18 2243   •  dextrose 50% (D50W) injection 25 mL  25 mL PRN TOM Pride       • losartan (COZAAR) tablet 50 mg  50 mg DAILY Adelina Vieyra M.D.   50 mg at 05/05/18 0813   • rosuvastatin (CRESTOR) tablet 20 mg  20 mg Q EVENING Leighton Cook M.D.   20 mg at 05/05/18 2046     Last reviewed on 4/28/2018  8:25 PM by Iain An R.N.    Quality  Measures:  Labs reviewed, Medications reviewed and Radiology images reviewed  Bravo catheter: No Bravo  Central line in place: Need for access    DVT Prophylaxis: Enoxaparin (Lovenox)  DVT prophylaxis - mechanical: SCDs  Ulcer prophylaxis: Not indicated      Assessment and Plan:  Postoperative ventilator management   - extubated 4/28   - IS/PEP, mobilize   - cont RT protocols   - stop lasix today  Status post 4-vessel coronary bypass grafting - 4/27   - aspirin, Plavix, statin, BB   - CVS protocols   - post op pain well controlled  Status post inferior STEMI   - Status post thrombolytic therapy at outside facility   - Status post emergent angioplasty of subtotally occluded mid circumflex coronary artery   - meds as above  Acute Ileus   - surgery following   - still present but improving   - tolerating clear liquids   - upper gi with small bowel follow through showing ileus still   - stopped reglan   - cont stool softeners  Acute systolic heart failure   - follow I/O closely  History of hypertension   - cont metoprolol  Hypokalemia   - still low/uncontrolled   - stopped Kscale   - started Kdur   - checking BMP/Mg today  Hiccups   - resolved   - stop baclofen   - stopping reglan  AF/RVR   - still in and out of SR/a-fib but rate controlled   - stopped IV amio   - cont oral amiodarone   - cont oral metoprolol  Prophylaxis   - lovenox, scds    Will continue to follow in the ICU  [id number]

## 2018-05-07 LAB
ALBUMIN SERPL BCP-MCNC: 2.8 G/DL (ref 3.2–4.9)
ALBUMIN/GLOB SERPL: 1.1 G/DL
ALP SERPL-CCNC: 58 U/L (ref 30–99)
ALT SERPL-CCNC: 32 U/L (ref 2–50)
ANION GAP SERPL CALC-SCNC: 6 MMOL/L (ref 0–11.9)
AST SERPL-CCNC: 17 U/L (ref 12–45)
BASOPHILS # BLD AUTO: 0.4 % (ref 0–1.8)
BASOPHILS # BLD: 0.06 K/UL (ref 0–0.12)
BILIRUB SERPL-MCNC: 1.1 MG/DL (ref 0.1–1.5)
BUN SERPL-MCNC: 16 MG/DL (ref 8–22)
CALCIUM SERPL-MCNC: 7.7 MG/DL (ref 8.5–10.5)
CHLORIDE SERPL-SCNC: 108 MMOL/L (ref 96–112)
CO2 SERPL-SCNC: 23 MMOL/L (ref 20–33)
CREAT SERPL-MCNC: 1.12 MG/DL (ref 0.5–1.4)
EOSINOPHIL # BLD AUTO: 0.52 K/UL (ref 0–0.51)
EOSINOPHIL NFR BLD: 3.5 % (ref 0–6.9)
ERYTHROCYTE [DISTWIDTH] IN BLOOD BY AUTOMATED COUNT: 42.2 FL (ref 35.9–50)
GLOBULIN SER CALC-MCNC: 2.5 G/DL (ref 1.9–3.5)
GLUCOSE SERPL-MCNC: 97 MG/DL (ref 65–99)
HCT VFR BLD AUTO: 34.5 % (ref 42–52)
HGB BLD-MCNC: 11.6 G/DL (ref 14–18)
IMM GRANULOCYTES # BLD AUTO: 0.25 K/UL (ref 0–0.11)
IMM GRANULOCYTES NFR BLD AUTO: 1.7 % (ref 0–0.9)
LYMPHOCYTES # BLD AUTO: 1.71 K/UL (ref 1–4.8)
LYMPHOCYTES NFR BLD: 11.6 % (ref 22–41)
MCH RBC QN AUTO: 30 PG (ref 27–33)
MCHC RBC AUTO-ENTMCNC: 33.6 G/DL (ref 33.7–35.3)
MCV RBC AUTO: 89.1 FL (ref 81.4–97.8)
MONOCYTES # BLD AUTO: 1.32 K/UL (ref 0–0.85)
MONOCYTES NFR BLD AUTO: 9 % (ref 0–13.4)
NEUTROPHILS # BLD AUTO: 10.85 K/UL (ref 1.82–7.42)
NEUTROPHILS NFR BLD: 73.8 % (ref 44–72)
NRBC # BLD AUTO: 0 K/UL
NRBC BLD-RTO: 0 /100 WBC
PLATELET # BLD AUTO: 232 K/UL (ref 164–446)
PMV BLD AUTO: 10.1 FL (ref 9–12.9)
POTASSIUM SERPL-SCNC: 3.9 MMOL/L (ref 3.6–5.5)
PROT SERPL-MCNC: 5.3 G/DL (ref 6–8.2)
RBC # BLD AUTO: 3.87 M/UL (ref 4.7–6.1)
SODIUM SERPL-SCNC: 137 MMOL/L (ref 135–145)
WBC # BLD AUTO: 14.7 K/UL (ref 4.8–10.8)

## 2018-05-07 PROCEDURE — 700111 HCHG RX REV CODE 636 W/ 250 OVERRIDE (IP): Performed by: NURSE PRACTITIONER

## 2018-05-07 PROCEDURE — 80053 COMPREHEN METABOLIC PANEL: CPT

## 2018-05-07 PROCEDURE — A9270 NON-COVERED ITEM OR SERVICE: HCPCS | Performed by: NURSE PRACTITIONER

## 2018-05-07 PROCEDURE — 700102 HCHG RX REV CODE 250 W/ 637 OVERRIDE(OP): Performed by: NURSE PRACTITIONER

## 2018-05-07 PROCEDURE — A9270 NON-COVERED ITEM OR SERVICE: HCPCS | Performed by: INTERNAL MEDICINE

## 2018-05-07 PROCEDURE — A9270 NON-COVERED ITEM OR SERVICE: HCPCS | Performed by: HOSPITALIST

## 2018-05-07 PROCEDURE — 700102 HCHG RX REV CODE 250 W/ 637 OVERRIDE(OP): Performed by: INTERNAL MEDICINE

## 2018-05-07 PROCEDURE — 85025 COMPLETE CBC W/AUTO DIFF WBC: CPT

## 2018-05-07 PROCEDURE — 700102 HCHG RX REV CODE 250 W/ 637 OVERRIDE(OP): Performed by: HOSPITALIST

## 2018-05-07 PROCEDURE — 770020 HCHG ROOM/CARE - TELE (206)

## 2018-05-07 RX ADMIN — ROSUVASTATIN CALCIUM 20 MG: 10 TABLET, FILM COATED ORAL at 20:19

## 2018-05-07 RX ADMIN — POTASSIUM CHLORIDE 40 MEQ: 1500 TABLET, EXTENDED RELEASE ORAL at 07:47

## 2018-05-07 RX ADMIN — ENOXAPARIN SODIUM 40 MG: 100 INJECTION SUBCUTANEOUS at 07:47

## 2018-05-07 RX ADMIN — AMIODARONE HYDROCHLORIDE 400 MG: 200 TABLET ORAL at 05:47

## 2018-05-07 RX ADMIN — AMIODARONE HYDROCHLORIDE 400 MG: 200 TABLET ORAL at 18:12

## 2018-05-07 RX ADMIN — CLOPIDOGREL 75 MG: 75 TABLET, FILM COATED ORAL at 07:48

## 2018-05-07 RX ADMIN — ASPIRIN 81 MG: 81 TABLET, COATED ORAL at 07:47

## 2018-05-07 RX ADMIN — METOPROLOL TARTRATE 50 MG: 50 TABLET, FILM COATED ORAL at 20:22

## 2018-05-07 RX ADMIN — LOSARTAN POTASSIUM 50 MG: 25 TABLET, FILM COATED ORAL at 07:48

## 2018-05-07 RX ADMIN — METOPROLOL TARTRATE 50 MG: 50 TABLET, FILM COATED ORAL at 07:47

## 2018-05-07 RX ADMIN — POTASSIUM CHLORIDE 40 MEQ: 1500 TABLET, EXTENDED RELEASE ORAL at 20:19

## 2018-05-07 ASSESSMENT — ENCOUNTER SYMPTOMS
EYES NEGATIVE: 1
PSYCHIATRIC NEGATIVE: 1
FOCAL WEAKNESS: 0
MUSCULOSKELETAL NEGATIVE: 1
CONSTITUTIONAL NEGATIVE: 1
DEPRESSION: 0
ABDOMINAL PAIN: 0
FEVER: 0
NEUROLOGICAL NEGATIVE: 1
RESPIRATORY NEGATIVE: 1
NAUSEA: 0
VOMITING: 0
CHILLS: 0
MYALGIAS: 0
COUGH: 0
DOUBLE VISION: 0
SPUTUM PRODUCTION: 0
SHORTNESS OF BREATH: 0
BLURRED VISION: 0

## 2018-05-07 ASSESSMENT — PAIN SCALES - GENERAL
PAINLEVEL_OUTOF10: 0

## 2018-05-07 ASSESSMENT — PATIENT HEALTH QUESTIONNAIRE - PHQ9
1. LITTLE INTEREST OR PLEASURE IN DOING THINGS: NOT AT ALL
SUM OF ALL RESPONSES TO PHQ9 QUESTIONS 1 AND 2: 0
2. FEELING DOWN, DEPRESSED, IRRITABLE, OR HOPELESS: NOT AT ALL

## 2018-05-07 NOTE — CARE PLAN
Problem: Nutritional:  Goal: Achieve adequate nutritional intake  Patient will consume >50% of meals   Outcome: PROGRESSING AS EXPECTED  - Per chart review of ADL's, this pt has consumed % 3 of the last 4 meals documented  - Attempted to see pt at bedside though he was out of room on attempt for follow-up.    Unable to provide diet education at this time. RD following for consistency in PO intake and to provide cardiac diet education.

## 2018-05-07 NOTE — PROGRESS NOTES
Appointments made by pt & pts mom for upcoming cardiologist appointment & primary care physician.     Cardiologist:    Dr. Morrison  Monday May 14th, 2018 at 0900.  [address]  [place], [place] [address]  Phone: [phone]  Fax: [phone]    Primary care:    [facility]  Dr. Benjamin Zavala  Thursday May 17th at 1300.  [address]  [place], [place] [address]  Phone: [phone]  Fax: [phone]      Authorization for release / disclosure of protected health information consent forms for both have been sent to medical records.

## 2018-05-07 NOTE — PROGRESS NOTES
Cardiovascular Surgery Progress Note    Name: Jae Venegas  MRN: [id number]  : 1970  Admit Date: 2018 10:33 PM  Procedure:  Procedure(s) and Anesthesia Type:     * MULTIPLE CORONARY ARTERY BYPASS ENDO VEIN HARVEST - X4, BILATERAL INTERNAL MAMMARY ARTERY HARVEST - General     * [place] - General  10 Day Post-Op    Vitals:  Patient Vitals for the past 8 hrs:   Temp SpO2 O2 Delivery NIBP Weight   18 0950 - - - 115/83 -   18 0800 36.5 °C (97.7 °F) 95 % None (Room Air) - -   18 0600 - - - - 109.4 kg (241 lb 2.9 oz)     Temp (24hrs), Av.2 °C (97.1 °F), Min:35.9 °C (96.7 °F), Max:36.5 °C (97.7 °F)      Respiratory:    Pulse Oximetry: 95 %     Chest Tube Drains:          Fluids:    Intake/Output Summary (Last 24 hours) at 18 1339  Last data filed at 18 0800   Gross per 24 hour   Intake              630 ml   Output              675 ml   Net              -45 ml     Admit weight: Weight: 108.9 kg (240 lb)  Current weight: Weight: 109.4 kg (241 lb 2.9 oz) (18 0600)    Labs:  Recent Labs      18   0547  18   0545  18   0350   WBC  12.2*  13.5*  14.7*   RBC  4.21*  3.90*  3.87*   HEMOGLOBIN  12.8*  11.6*  11.6*   HEMATOCRIT  38.5*  34.5*  34.5*   MCV  91.4  88.5  89.1   MCH  30.4  29.7  30.0   MCHC  33.2*  33.6*  33.6*   RDW  44.2  42.2  42.2   PLATELETCT  229  207  232   MPV  10.0  10.3  10.1     Recent Labs      18   0547  18   0545  18   0350   NEUTSPOLYS  68.70  74.60*  73.80*   LYMPHOCYTES  12.40*  9.50*  11.60*   MONOCYTES  11.70  9.80  9.00   EOSINOPHILS  5.20  4.40  3.50   BASOPHILS  0.90  0.40  0.40     Recent Labs      18   0545  18   1223  18   0350   SODIUM  136  137  137   POTASSIUM  3.1*  3.4*  3.9   CHLORIDE  103  105  108   CO2  26  24  23   GLUCOSE  102*  92  97   BUN  22  20  16   CREATININE  1.30  1.25  1.12   CALCIUM  7.7*  7.7*  7.7*           Medications:  • potassium chloride SA  40 mEq     • metoprolol  50 mg      • amiodarone  400 mg     • enoxaparin  40 mg     • aspirin EC  81 mg     • clopidogrel  75 mg     • losartan  50 mg     • rosuvastatin  20 mg         Exam:   Review of Systems   Constitutional: Negative.    HENT: Negative.    Eyes: Negative.    Respiratory: Negative.    Cardiovascular: Chest pain: MSK.   Gastrointestinal: Negative for abdominal pain, nausea and vomiting.   Genitourinary: Negative.    Musculoskeletal: Negative.    Skin: Negative.    Neurological: Negative.    Endo/Heme/Allergies: Negative.    Psychiatric/Behavioral: Negative.        Physical Exam   Constitutional: He is oriented to person, place, and time. He appears well-developed and well-nourished.   HENT:   Head: Normocephalic.   Eyes: Pupils are equal, round, and reactive to light.   Neck: Normal range of motion. No JVD present.   Cardiovascular: Normal rate, regular rhythm and normal heart sounds.    Pulmonary/Chest: Effort normal. He has decreased breath sounds in the right lower field and the left lower field.   Abdominal: Soft. He exhibits no distension. Bowel sounds are increased. There is tenderness in the right upper quadrant. There is no guarding.   Musculoskeletal: Normal range of motion.   Neurological: He is alert and oriented to person, place, and time.   Skin: Skin is warm and dry.   prevena to chest, EVH sites CDI   Psychiatric: He has a normal mood and affect. His behavior is normal. Judgment and thought content normal.       Quality Measures:   Quality-Core Measures   Bravo catheter::  No Bravo  Central line in place:  Need for access  DVT prophylaxis pharmacological::  Contraindicated - High bleeding risk  DVT prophylaxis - mechanical:  SCDs  Ulcer Prophylaxis::  Yes  Assessed for rehabilitation services:  Patient returned to prior level of function, rehabilitation not indicated at this time    Assessment/Plan:  POD 1 HDS on low dose epi.  NSR. Vented, ABGs good, weaning to extubated this AM. CT output min, no airlekas.  Will  keep CTs/gill today.  Plan to start diuresis once BP stable off epi.  Continuing ABX x 72 hours for bilateral ELIANE harvests.  CPM.  POD 2 HDS< NSR (afib with RVR this AM, converted on amio gtt protocol). Pain issues, shallow breathing.  CT output min, no airleak.  Passing gas, no BM.  PLAN:  Continue amio protocol. Add PO dilaudid.  Stress IS use.  DC mediastinal CTs and gill.  Diurese.   POD 3 HDS, Afib for 40 minutes this am- now SR- on amio gtt, CT bilat pleural tubes- 50/70 cc, sedate- dc ms contin, inc diuresis, amb, enc IS  POD 4 HDS, NSR on amio, min output blakes.  ABD distention, hyper/distant bowel sounds, RUQ pain.  States passing gas, but no BM.  PLAN:  DC blakes.  Continue diuresis.  ABD xray now.  Add reglan. Limit narcotics. AMB/IS.  POD 5 HDS/HTN, NSR/ST, short bout of afib again with ambulation rates to 140s, currently NSR on amio gtt.  States feels a little better this AM.  Had large loose/watery stool this AM.  ABD remains very distended, hypobowel sounds.  PLAN:  Keep NPO, gen surg following.  Continue diuresis, decrease amount since NPO.  Decrease amio gtt to 0.5 mg/min.  Increase BB. AMB/IS.    POD 6 HDS, SR/ST- inc beta blocker- change amio to PO, ileus- tolerating clear liquids- surgery following, replace K+,  amb, enc IS   POD 7 HDS< NSR, n/v overnight, abd remains distended, hypo bowel sounds.  K low, creatinine up slightly.  PLAN;  Upper GI series this AM.  Replace K.  DC lasix, start maintenance fluids while NPO.  CPM.  POD 8 HDS SR, no n/v overnight, episode of diarrhea- gen surgery following, hypokalemia- start k-scale, Cr stable, amb, enc IS  POD 9 HDS, SR, ileus- formed stool last night- getting reg diet today- gen surgery following, hypokalemia- on k+ scale- add oral potassium, Cr stable, planning flight home tues/wed if tolerates diet  POD 10  HDS, SR, neuro intact, wounds CDI, abdomin hypoactive BS but did have BM this AM, fluid balance negative, wt stable.  Plan:  DC tomorrow if  still tolerating regular diet, BB, ACE, Statin, needs f/u in [place], IS/ambulate. CPM.    Patient seen, examined and plan reviewed with midlevel provider. I agree with the plan.      Active Hospital Problems    Diagnosis   • STEMI (ST elevation myocardial infarction) (HCC) [I21.3]     Priority: High   • HTN (hypertension) [I10]

## 2018-05-07 NOTE — DISCHARGE PLANNING
Medical SW    Referral: Sw received signed letter from Dr Stinson.    Pt reported to Jenae he is flying home tomorrow in the AM. Sw called bedside RN. Sw left completed/signed letter on pt's chart in cupboard outside bedroom.       Plan: Sw to assist w/ d/c planning as needed.

## 2018-05-07 NOTE — CARE PLAN
Problem: Post Op Day 4 CABG/Heart Valve Replacement  Goal: Optimal care of the Post Op CABG/Heart Valve replacement Post Op Day 4    Intervention: Daily Weights  Completed  Intervention: Shower daily and clean incisions twice daily with soap and water  Completed on dayshift     Intervention: Up in chair for all meals  Completed  Intervention: Ambulate, increasing the distance each time x 3 and before bed  Ambulates twice around unit, patient is up self.   Intervention: IS q 1 hour while awake and record best IS volume  2200 Best IS  Intervention: Consider removal of gill, chest tube and pacer wire if not already done  Completed

## 2018-05-07 NOTE — PROGRESS NOTES
Report received at bedside from Tess IRVING, assumed care of patient. Patient A&O x 4 with a flat affect, however no signs of distress noted. All LDAs assessed. Discussed POC with patient and mother and all questions answered at this time. Patient reports no pain at this time. Bed in lowest position, upper side rails up. Non-skid socks in place. Call light within reach. Personal possessions in reach. Will continue to monitor pt status.

## 2018-05-07 NOTE — PROGRESS NOTES
Pulmonary Critical Care Progress Note      Date of Admission:  4/25/2018    Chief Complaint:  Postoperative ventilator management for 4 V CABG    HPI: 48-year-old male who was transferred   in from an outside facility after presenting with chest pain and had an EKG   revealing an ST elevation myocardial infarction.  Dr. Caceres took the patient   to the cardiac catheterization lab late 04/25 and identified severe   multivessel disease.  PTCA was performed of 100% lesion in mid circ.  The   patient had mild LV dysfunction with ejection fraction calculated at 50% with   akinesis of the inferior wall.  Dr. Pike saw the patient in consultation   yesterday and had taken him to the operating room today for coronary bypass   grafting surgery.  A 4-vessel bypass was performed and he was transferred to   the [facility] for recovery.  The patient's LV function was good coming off pump per   anesthesia.  Patient did require epinephrine infusion for hypotension.  He has   been hyperglycemic during the case and an insulin drip was started during the   case.  He was started on dexmedetomidine and transferred to the ICU.  Initial   blood gases revealed significant metabolic acidosis, pH is 7.29 and he is   kept on high support on a ventilator and intravenous bicarbonate has been   administered.  His initial chest x-ray post-procedure revealed some   atelectasis at the left base, primarily in a little vascular plethoric   consistent with possible edema.  Tubes and lines looked good.  He is starting   to wake up to participate.  His temperature is 36.3.  He has had no ectopy,   but his baseline rhythm is left bundle-branch block.    Interval Events:  24 hour interval history reviewed  Tm 97  +255cc over last 24hr, -5.7L since admit  No cxr this am  Wbc 13->14  K 3.9  Cr 1.25->1.12    IS/PEP  Tolerating orals  Last BM 5/7      Review of Systems   Constitutional: Negative for chills and fever.   HENT: Negative for congestion.    Eyes:  Negative for blurred vision and double vision.   Respiratory: Negative for cough, sputum production and shortness of breath.    Cardiovascular: Negative for chest pain and leg swelling.   Gastrointestinal: Negative for nausea and vomiting.   Genitourinary: Negative for dysuria.   Musculoskeletal: Negative for myalgias.   Neurological: Negative for focal weakness.   Psychiatric/Behavioral: Negative for depression.   All other systems reviewed and are negative.      Physical Exam   Constitutional: He is oriented to person, place, and time and well-developed, well-nourished, and in no distress.   obese   HENT:   Head: Normocephalic and atraumatic.   Mouth/Throat: No oropharyngeal exudate.   Eyes: Conjunctivae are normal. Pupils are equal, round, and reactive to light.   Neck: No tracheal deviation present.   Cardiovascular: Normal rate and regular rhythm.    Pulmonary/Chest: No stridor. He has no wheezes. He has no rales.   Abdominal: Soft. He exhibits no distension. There is no tenderness.   Musculoskeletal: He exhibits no edema.   Neurological: He is alert and oriented to person, place, and time. He has normal reflexes. No cranial nerve deficit.   Skin: Skin is warm and dry.   Psychiatric: Affect normal.   Nursing note and vitals reviewed.      PFSH:  No change.    Respiratory:     Pulse Oximetry: 94 %    HemoDynamics:  Pulse: 78  NIBP: 119/82        Fluids:  Intake/Output       05/05/18 0700 - 05/06/18 0659 05/06/18 0700 - 05/07/18 0659 05/07/18 0700 - 05/08/18 0659      1039-8636 1713-3467 Total 0675-6177 0816-2943 Total 1276-6984 5950-2665 Total       Intake    P.O.  100  780 880  840  90 930  --  -- --    P.O. 100 780 880 840 90 930 -- -- --    I.V.  400  600 1000  --  -- --  --  -- --    IV Piggyback Volume 200 600 800 -- -- -- -- -- --    IV Volume (NS>) 200 -- 200 -- -- -- -- -- --    Total Intake 500 1380 1880 840 90 930 -- -- --       Output    Urine  250  700 950  --  675 675  --  -- --    Number of Times  Voided -- 4 x 4 x 4 x 3 x 7 x -- -- --    Urine Void (mL) (non-catheter) 250 700 950 -- 994 675 -- -- --    Stool  --  -- --  --  -- --  --  -- --    Number of Times Stooled 1 x 5 x 6 x 2 x 2 x 4 x -- -- --    Total Output 250 700 950 -- 679 675 -- -- --       Net I/O     250 680 930 840 -585 255 -- -- --        Weight: 109.4 kg (241 lb 2.9 oz)  Recent Labs      1845  18   1223  18   0350   SODIUM  136  137  137   POTASSIUM  3.1*  3.4*  3.9   CHLORIDE  103  105  108   CO2  26  24  23   BUN  22  20  16   CREATININE  1.30  1.25  1.12   MAGNESIUM   --   2.4   --    CALCIUM  7.7*  7.7*  7.7*       GI/Nutrition:    Liver Function  Recent Labs      1845  18   1223  18   0350   ALTSGPT  45  40   --   32   ASTSGOT  32  25   --   17   ALKPHOSPHAT  65  58   --   58   TBILIRUBIN  1.6*  1.4   --   1.1   GLUCOSE  96  102*  92  97       Heme:  Recent Labs      1845  18   0350   RBC  4.21*  3.90*  3.87*   HEMOGLOBIN  12.8*  11.6*  11.6*   HEMATOCRIT  38.5*  34.5*  34.5*   PLATELETCT  229  207  232       Infectious Disease:  Temp  Av.1 °C (96.9 °F)  Min: 35.9 °C (96.7 °F)  Max: 36.1 °C (97 °F)    Recent Labs      18   0545  18   0350   WBC  12.2*  13.5*  14.7*   NEUTSPOLYS  68.70  74.60*  73.80*   LYMPHOCYTES  12.40*  9.50*  11.60*   MONOCYTES  11.70  9.80  9.00   EOSINOPHILS  5.20  4.40  3.50   BASOPHILS  0.90  0.40  0.40   ASTSGOT  32  25  17   ALTSGPT  45  40  32   ALKPHOSPHAT  65  58  58   TBILIRUBIN  1.6*  1.4  1.1     Current Facility-Administered Medications   Medication Dose Frequency Provider Last Rate Last Dose   • potassium chloride SA (Kdur) tablet 40 mEq  40 mEq BID Theresa Reddy, A.P.N.   40 mEq at 18 2101   • NS infusion   Continuous Joi Zaragoza M.D. 10 mL/hr at 18 1324     • metoprolol (LOPRESSOR) tablet 50 mg  50 mg BID Theresa Reddy, A.P.N.   50 mg at 18  2102   • calcium carbonate (TUMS) chewable tab 500 mg  500 mg Q4HRS PRN Theresa Reddy, A.P.N.   500 mg at 05/03/18 2357   • amiodarone (CORDARONE) tablet 400 mg  400 mg BID Alisson Escobar, A.P.N.   400 mg at 05/07/18 0547   • ipratropium-albuterol (DUONEB) nebulizer solution 3 mL  3 mL Q4H PRN (RT) Andres Guidry M.D.   3 mL at 04/28/18 2141   • Respiratory Care per Protocol   Continuous RT Alisson Escobar, A.P.N.       • Pharmacy Consult Request ...Pain Management Review 1 Each  1 Each PRN Alisson Escobar, A.P.N.       • enoxaparin (LOVENOX) inj 40 mg  40 mg DAILY Alisson Escobar, A.P.N.   40 mg at 05/06/18 0749   • aspirin EC (ECOTRIN) tablet 81 mg  81 mg DAILY Alisson Escobar, A.P.N.   81 mg at 05/06/18 0748   • clopidogrel (PLAVIX) tablet 75 mg  75 mg DAILY Alisson Escobar, A.P.N.   75 mg at 05/06/18 0748   • oxyCODONE immediate-release (ROXICODONE) tablet 5 mg  5 mg Q3HRS PRN Alisson Escobar, A.P.N.   5 mg at 05/03/18 1942   • oxyCODONE immediate release (ROXICODONE) tablet 10 mg  10 mg Q3HRS PRN Alisson Escobar, A.P.N.   10 mg at 05/01/18 1801   • tramadol (ULTRAM) 50 MG tablet 50 mg  50 mg Q4HRS PRN Alisson Escobar, A.P.N.   50 mg at 05/02/18 0147   • ondansetron (ZOFRAN) syringe/vial injection 4 mg  4 mg Q6HRS PRN Alisson Escobar, A.P.N.   4 mg at 05/04/18 0303    Or   • prochlorperazine (COMPAZINE) injection 10 mg  10 mg Q6HRS PRN Alisson Escobar, A.P.N.   10 mg at 05/04/18 0549    Or   • promethazine (PHENERGAN) suppository 25 mg  25 mg Q6HRS PRN Alisson Escobar, A.P.N.       • acetaminophen (TYLENOL) tablet 650 mg  650 mg Q4HRS PRN Alisson Escobar, A.P.N.   650 mg at 04/30/18 1910    Or   • acetaminophen (TYLENOL) suppository 650 mg  650 mg Q4HRS PRN Alisson Escobar, A.P.N.       • mag hydrox-al hydrox-simeth (MAALOX PLUS ES or MYLANTA DS) suspension 30 mL  30 mL Q4HRS PRN Alisson Escobar, A.P.N.   30 mL at 05/03/18 2119   • diphenhydrAMINE (BENADRYL) tablet/capsule 25 mg  25 mg  HS PRN - MR X 1 Alisson Escobar A.P.N.   25 mg at 05/03/18 2243   • dextrose 50% (D50W) injection 25 mL  25 mL PRN Alisson Escobar A.P.N.       • losartan (COZAAR) tablet 50 mg  50 mg DAILY Adelina Vieyra M.D.   50 mg at 05/06/18 0748   • rosuvastatin (CRESTOR) tablet 20 mg  20 mg Q EVENING Leighton Cook M.D.   20 mg at 05/06/18 2102     Last reviewed on 4/28/2018  8:25 PM by Iain An R.N.    Quality  Measures:  Labs reviewed, Medications reviewed and Radiology images reviewed                  Assessment and Plan:  Postoperative ventilator management   - extubated 4/28   - IS/PEP/Mobilize   - Continue Rt/02 Protocols   - keep dry fluid balance     Status post 4-vessel coronary bypass grafting - 4/27   - aspirin, Plavix, statin, BB, arb   - CVS protocols   - post op pain well controlled    Status post inferior STEMI   - Status post thrombolytic therapy at outside facility   - Status post emergent angioplasty of subtotally occluded mid circumflex coronary artery   - meds as above    Acute Ileus   - tolerating clear liquids   - having bowel movements   - trending towards improvement    Acute systolic heart failure   - follow I/O closely    History of hypertension   - cont metoprolol    Hypokalemia   - I am replacing to keep > 4.0    Hiccups   - resolved    AF/RVR   - still in and out of SR/a-fib but rate controlled   - stopped IV amio   - cont oral amiodarone   - cont oral metoprolol

## 2018-05-07 NOTE — DISCHARGE PLANNING
Medical SW    Referral: Jenae met w/ APN w/ heart surgery.    Jenae completed letter for [employer] indicating pt is medically cleared following heart surgery. He is able to fly home to [place].    Jenae faxed letter to heart surgeon office requesting an MD signature. Jenae called office to advise of fax.    Plan: Sw to assist w/ d/c planning as needed.

## 2018-05-07 NOTE — PROGRESS NOTES
"S:  48 y.o.male s/p coronary artery bypass with postoperative ileus. Tolerating a diet.  Reported good bowel movements.  Denies nausea or vomiting.  O:  Blood pressure 126/67, pulse 78, temperature 36.5 °C (97.7 °F), resp. rate (!) 25, height 1.854 m (6' 1\"), weight 109.4 kg (241 lb 2.9 oz), SpO2 95 %.  I/O last 3 completed shifts:  In: 1090.2 [P.O.:600; I.V.:490.2]  Out: 5340 [Urine:5250]  Recent Labs      05/06/18   0545  05/06/18   1223  05/07/18   0350   SODIUM  136  137  137   POTASSIUM  3.1*  3.4*  3.9   CHLORIDE  103  105  108   CO2  26  24  23   GLUCOSE  102*  92  97   BUN  22  20  16   CREATININE  1.30  1.25  1.12   CALCIUM  7.7*  7.7*  7.7*     Recent Labs      05/05/18   0547  05/06/18   0545  05/07/18   0350   WBC  12.2*  13.5*  14.7*   RBC  4.21*  3.90*  3.87*   HEMOGLOBIN  12.8*  11.6*  11.6*   HEMATOCRIT  38.5*  34.5*  34.5*   MCV  91.4  88.5  89.1   MCH  30.4  29.7  30.0   MCHC  33.2*  33.6*  33.6*   RDW  44.2  42.2  42.2   PLATELETCT  229  207  232   MPV  10.0  10.3  10.1       Alert and Oriented x3, No Acute Distress  Normal Respiratory Effort  Abdomen softer, minimal distention without tenderness, rigidity or guarding  Incisions/Bandages clean/dry/intact  Extremities warm and well perfused      A/P: resolving paralytic ileus  Pain control with multimodal nonnarcotic pain therapy  Continue to replace potassium while on diuretics  Ambulate tid and ad mishel  We'll sign off  DC planning per primary  Stuart Sanabria MD PhD  [facility]  Colon and Rectal Surgeon  [phone]    "

## 2018-05-07 NOTE — DISCHARGE PLANNING
Medical SW    Sw attended AM IDT Rounds.    RN reports, pt voiding in toilet, mobilizing,     Plan: Sw to assist w/ d/c planning as needed.

## 2018-05-08 VITALS
TEMPERATURE: 97.3 F | DIASTOLIC BLOOD PRESSURE: 80 MMHG | BODY MASS INDEX: 30.27 KG/M2 | SYSTOLIC BLOOD PRESSURE: 126 MMHG | WEIGHT: 228.4 LBS | RESPIRATION RATE: 25 BRPM | HEART RATE: 67 BPM | HEIGHT: 73 IN | OXYGEN SATURATION: 98 %

## 2018-05-08 PROBLEM — I21.3 STEMI (ST ELEVATION MYOCARDIAL INFARCTION) (HCC): Status: RESOLVED | Noted: 2018-04-26 | Resolved: 2018-05-08

## 2018-05-08 LAB
ALBUMIN SERPL BCP-MCNC: 3.1 G/DL (ref 3.2–4.9)
ALBUMIN/GLOB SERPL: 1.1 G/DL
ALP SERPL-CCNC: 65 U/L (ref 30–99)
ALT SERPL-CCNC: 34 U/L (ref 2–50)
ANION GAP SERPL CALC-SCNC: 5 MMOL/L (ref 0–11.9)
AST SERPL-CCNC: 24 U/L (ref 12–45)
BASOPHILS # BLD AUTO: 0.7 % (ref 0–1.8)
BASOPHILS # BLD: 0.14 K/UL (ref 0–0.12)
BILIRUB SERPL-MCNC: 1 MG/DL (ref 0.1–1.5)
BUN SERPL-MCNC: 14 MG/DL (ref 8–22)
CALCIUM SERPL-MCNC: 8 MG/DL (ref 8.5–10.5)
CHLORIDE SERPL-SCNC: 110 MMOL/L (ref 96–112)
CO2 SERPL-SCNC: 23 MMOL/L (ref 20–33)
CREAT SERPL-MCNC: 1.13 MG/DL (ref 0.5–1.4)
EOSINOPHIL # BLD AUTO: 0.72 K/UL (ref 0–0.51)
EOSINOPHIL NFR BLD: 3.4 % (ref 0–6.9)
ERYTHROCYTE [DISTWIDTH] IN BLOOD BY AUTOMATED COUNT: 44.1 FL (ref 35.9–50)
GLOBULIN SER CALC-MCNC: 2.7 G/DL (ref 1.9–3.5)
GLUCOSE SERPL-MCNC: 87 MG/DL (ref 65–99)
HCT VFR BLD AUTO: 39.3 % (ref 42–52)
HGB BLD-MCNC: 12.9 G/DL (ref 14–18)
IMM GRANULOCYTES # BLD AUTO: 0.43 K/UL (ref 0–0.11)
IMM GRANULOCYTES NFR BLD AUTO: 2.1 % (ref 0–0.9)
LYMPHOCYTES # BLD AUTO: 2.23 K/UL (ref 1–4.8)
LYMPHOCYTES NFR BLD: 10.7 % (ref 22–41)
MAGNESIUM SERPL-MCNC: 2.3 MG/DL (ref 1.5–2.5)
MCH RBC QN AUTO: 29.9 PG (ref 27–33)
MCHC RBC AUTO-ENTMCNC: 32.8 G/DL (ref 33.7–35.3)
MCV RBC AUTO: 91.2 FL (ref 81.4–97.8)
MONOCYTES # BLD AUTO: 1.69 K/UL (ref 0–0.85)
MONOCYTES NFR BLD AUTO: 8.1 % (ref 0–13.4)
NEUTROPHILS # BLD AUTO: 15.72 K/UL (ref 1.82–7.42)
NEUTROPHILS NFR BLD: 75 % (ref 44–72)
NRBC # BLD AUTO: 0 K/UL
NRBC BLD-RTO: 0 /100 WBC
PLATELET # BLD AUTO: 316 K/UL (ref 164–446)
PMV BLD AUTO: 10.2 FL (ref 9–12.9)
POTASSIUM SERPL-SCNC: 5 MMOL/L (ref 3.6–5.5)
PROT SERPL-MCNC: 5.8 G/DL (ref 6–8.2)
RBC # BLD AUTO: 4.31 M/UL (ref 4.7–6.1)
SODIUM SERPL-SCNC: 138 MMOL/L (ref 135–145)
WBC # BLD AUTO: 20.9 K/UL (ref 4.8–10.8)

## 2018-05-08 PROCEDURE — A9270 NON-COVERED ITEM OR SERVICE: HCPCS | Performed by: NURSE PRACTITIONER

## 2018-05-08 PROCEDURE — 80053 COMPREHEN METABOLIC PANEL: CPT

## 2018-05-08 PROCEDURE — 700102 HCHG RX REV CODE 250 W/ 637 OVERRIDE(OP): Performed by: HOSPITALIST

## 2018-05-08 PROCEDURE — 97535 SELF CARE MNGMENT TRAINING: CPT

## 2018-05-08 PROCEDURE — 700111 HCHG RX REV CODE 636 W/ 250 OVERRIDE (IP): Performed by: NURSE PRACTITIONER

## 2018-05-08 PROCEDURE — 700102 HCHG RX REV CODE 250 W/ 637 OVERRIDE(OP): Performed by: NURSE PRACTITIONER

## 2018-05-08 PROCEDURE — 85025 COMPLETE CBC W/AUTO DIFF WBC: CPT

## 2018-05-08 PROCEDURE — A9270 NON-COVERED ITEM OR SERVICE: HCPCS | Performed by: HOSPITALIST

## 2018-05-08 PROCEDURE — 83735 ASSAY OF MAGNESIUM: CPT

## 2018-05-08 RX ORDER — POTASSIUM CHLORIDE 20 MEQ/1
20 TABLET, EXTENDED RELEASE ORAL DAILY
Qty: 30 TAB | Refills: 0
Start: 2018-05-08

## 2018-05-08 RX ORDER — HYDROCODONE BITARTRATE AND ACETAMINOPHEN 5; 325 MG/1; MG/1
1-2 TABLET ORAL EVERY 6 HOURS PRN
Status: DISCONTINUED | OUTPATIENT
Start: 2018-05-08 | End: 2018-05-08 | Stop reason: HOSPADM

## 2018-05-08 RX ORDER — HYDROCODONE BITARTRATE AND ACETAMINOPHEN 5; 325 MG/1; MG/1
1-2 TABLET ORAL EVERY 6 HOURS PRN
Qty: 45 TAB | Refills: 0
Start: 2018-05-08 | End: 2018-05-15

## 2018-05-08 RX ORDER — ASPIRIN 81 MG/1
81 TABLET ORAL DAILY
Qty: 30 TAB | COMMUNITY
Start: 2018-05-09

## 2018-05-08 RX ORDER — LOSARTAN POTASSIUM 50 MG/1
50 TABLET ORAL DAILY
Qty: 90 TAB | Refills: 0
Start: 2018-05-09

## 2018-05-08 RX ORDER — AMIODARONE HYDROCHLORIDE 200 MG/1
200 TABLET ORAL ONCE
Qty: 1 TAB | Refills: 0
Start: 2018-05-08 | End: 2018-05-08

## 2018-05-08 RX ORDER — CLOPIDOGREL BISULFATE 75 MG/1
75 TABLET ORAL DAILY
Qty: 90 TAB | Refills: 0
Start: 2018-05-09

## 2018-05-08 RX ORDER — AMIODARONE HYDROCHLORIDE 200 MG/1
TABLET ORAL
Qty: 57 TAB | Refills: 0 | Status: SHIPPED | OUTPATIENT
Start: 2018-05-08

## 2018-05-08 RX ORDER — ROSUVASTATIN CALCIUM 20 MG/1
20 TABLET, COATED ORAL EVERY EVENING
Qty: 30 TAB | Refills: 11
Start: 2018-05-08

## 2018-05-08 RX ORDER — AMIODARONE HYDROCHLORIDE 200 MG/1
200 TABLET ORAL ONCE
Status: DISCONTINUED | OUTPATIENT
Start: 2018-05-08 | End: 2018-05-08 | Stop reason: HOSPADM

## 2018-05-08 RX ADMIN — CLOPIDOGREL 75 MG: 75 TABLET, FILM COATED ORAL at 08:00

## 2018-05-08 RX ADMIN — METOPROLOL TARTRATE 50 MG: 50 TABLET, FILM COATED ORAL at 08:00

## 2018-05-08 RX ADMIN — LOSARTAN POTASSIUM 50 MG: 25 TABLET, FILM COATED ORAL at 08:00

## 2018-05-08 RX ADMIN — AMIODARONE HYDROCHLORIDE 400 MG: 200 TABLET ORAL at 05:38

## 2018-05-08 RX ADMIN — ASPIRIN 81 MG: 81 TABLET, COATED ORAL at 08:00

## 2018-05-08 RX ADMIN — ENOXAPARIN SODIUM 40 MG: 100 INJECTION SUBCUTANEOUS at 08:00

## 2018-05-08 ASSESSMENT — ENCOUNTER SYMPTOMS
EYES NEGATIVE: 1
BLURRED VISION: 0
FEVER: 0
VOMITING: 0
NEUROLOGICAL NEGATIVE: 1
DEPRESSION: 0
PSYCHIATRIC NEGATIVE: 1
DOUBLE VISION: 0
MUSCULOSKELETAL NEGATIVE: 1
SPUTUM PRODUCTION: 0
COUGH: 0
MYALGIAS: 0
SHORTNESS OF BREATH: 0
RESPIRATORY NEGATIVE: 1
CHILLS: 0
NAUSEA: 0
CONSTITUTIONAL NEGATIVE: 1
FOCAL WEAKNESS: 0
ABDOMINAL PAIN: 0

## 2018-05-08 ASSESSMENT — PAIN SCALES - GENERAL
PAINLEVEL_OUTOF10: 0

## 2018-05-08 NOTE — THERAPY
REviewed post ohs handout. Reviewed therex/talk test/precautions/restrictions.  PT will be available should pt have any questions.

## 2018-05-08 NOTE — DISCHARGE INSTRUCTIONS
Furosemide tablets  What is this medicine?  FUROSEMIDE (katherine [place] se mide) is a diuretic. It helps you make more urine and to lose salt and excess water from your body. This medicine is used to treat high blood pressure, and edema or swelling from heart, kidney, or liver disease.  This medicine may be used for other purposes; ask your health care provider or pharmacist if you have questions.  COMMON BRAND NAME(S): Active-Medicated Specimen Kit, Delone, Diuscreen, Lasix, RX Specimen Collection Kit, Specimen Collection Kit, URINX Medicated Specimen Collection  What should I tell my health care provider before I take this medicine?  They need to know if you have any of these conditions:  -abnormal blood electrolytes  -diarrhea or vomiting  -gout  -heart disease  -kidney disease, small amounts of urine, or difficulty passing urine  -liver disease  -thyroid disease  -an unusual or allergic reaction to furosemide, sulfa drugs, other medicines, foods, dyes, or preservatives  -pregnant or trying to get pregnant  -breast-feeding  How should I use this medicine?  Take this medicine by mouth with a glass of water. Follow the directions on the prescription label. You may take this medicine with or without food. If it upsets your stomach, take it with food or milk. Do not take your medicine more often than directed. Remember that you will need to pass more urine after taking this medicine. Do not take your medicine at a time of day that will cause you problems. Do not take at bedtime.  Talk to your pediatrician regarding the use of this medicine in children. While this drug may be prescribed for selected conditions, precautions do apply.  Overdosage: If you think you have taken too much of this medicine contact a poison control center or emergency room at once.  NOTE: This medicine is only for you. Do not share this medicine with others.  What if I miss a dose?  If you miss a dose, take it as soon as you can. If it is almost time  for your next dose, take only that dose. Do not take double or extra doses.  What may interact with this medicine?  -aspirin and aspirin-like medicines  -certain antibiotics  -chloral hydrate  -cisplatin  -cyclosporine  -digoxin  -diuretics  -laxatives  -lithium  -medicines for blood pressure  -medicines that relax muscles for surgery  -methotrexate  -NSAIDs, medicines for pain and inflammation like ibuprofen, naproxen, or indomethacin  -phenytoin  -steroid medicines like prednisone or cortisone  -sucralfate  -thyroid hormones  This list may not describe all possible interactions. Give your health care provider a list of all the medicines, herbs, non-prescription drugs, or dietary supplements you use. Also tell them if you smoke, drink alcohol, or use illegal drugs. Some items may interact with your medicine.  What should I watch for while using this medicine?  Visit your doctor or health care professional for regular checks on your progress. Check your blood pressure regularly. Ask your doctor or health care professional what your blood pressure should be, and when you should contact him or her. If you are a diabetic, check your blood sugar as directed.  You may need to be on a special diet while taking this medicine. Check with your doctor. Also, ask how many glasses of fluid you need to drink a day. You must not get dehydrated.  You may get drowsy or dizzy. Do not drive, use machinery, or do anything that needs mental alertness until you know how this drug affects you. Do not stand or sit up quickly, especially if you are an older patient. This reduces the risk of dizzy or fainting spells. Alcohol can make you more drowsy and dizzy. Avoid alcoholic drinks.  This medicine can make you more sensitive to the sun. Keep out of the sun. If you cannot avoid being in the sun, wear protective clothing and use sunscreen. Do not use sun lamps or tanning beds/booths.  What side effects may I notice from receiving this  medicine?  Side effects that you should report to your doctor or health care professional as soon as possible:  -blood in urine or stools  -dry mouth  -fever or chills  -hearing loss or ringing in the ears  -irregular heartbeat  -muscle pain or weakness, cramps  -skin rash  -stomach upset, pain, or nausea  -tingling or numbness in the hands or feet  -unusually weak or tired  -vomiting or diarrhea  -yellowing of the eyes or skin  Side effects that usually do not require medical attention (report to your doctor or health care professional if they continue or are bothersome):  -headache  -loss of appetite  -unusual bleeding or bruising  This list may not describe all possible side effects. Call your doctor for medical advice about side effects. You may report side effects to FDA at 2-833-FDA-2987.  Where should I keep my medicine?  Keep out of the reach of children.  Store at room temperature between 15 and 30 degrees C (59 and 86 degrees F). Protect from light. Throw away any unused medicine after the expiration date.  NOTE: This sheet is a summary. It may not cover all possible information. If you have questions about this medicine, talk to your doctor, pharmacist, or health care provider.  © 2018 [employer]/Gold Standard (2016-03-09 13:49:50)    Clopidogrel tablets  What is this medicine?  CLOPIDOGREL (kloh PID oh grel) helps to prevent blood clots. This medicine is used to prevent heart attack, stroke, or other vascular events in people who are at high risk.  This medicine may be used for other purposes; ask your health care provider or pharmacist if you have questions.  COMMON BRAND NAME(S): Plavix  What should I tell my health care provider before I take this medicine?  They need to know if you have any of the following conditions:  -bleeding disorders  -bleeding in the brain  -having surgery  -history of stomach bleeding  -an unusual or allergic reaction to clopidogrel, other medicines, foods, dyes, or  preservatives  -pregnant or trying to get pregnant  -breast-feeding  How should I use this medicine?  Take this medicine by mouth with a glass of water. Follow the directions on the prescription label. You may take this medicine with or without food. If it upsets your stomach, take it with food. Take your medicine at regular intervals. Do not take it more often than directed. Do not stop taking except on your doctor's advice.  A special MedGuide will be given to you by the pharmacist with each prescription and refill. Be sure to read this information carefully each time.  Talk to your pediatrician regarding the use of this medicine in children. Special care may be needed.  Overdosage: If you think you have taken too much of this medicine contact a poison control center or emergency room at once.  NOTE: This medicine is only for you. Do not share this medicine with others.  What if I miss a dose?  If you miss a dose, take it as soon as you can. If it is almost time for your next dose, take only that dose. Do not take double or extra doses.  What may interact with this medicine?  Do not take this medicine with the following medications:  -dasabuvir; ombitasvir; paritaprevir; ritonavir  -defibrotide  This medicine may also interact with the following medications:  -antiviral medicines for HIV or AIDS  -aspirin  -certain medicines for depression like citalopram, fluoxetine, fluvoxamine  -certain medicines for fungal infections like ketoconazole, fluconazole, voriconazole  -certain medicines for seizures like felbamate, oxcarbazepine, phenytoin  -certain medicines for stomach problems like cimetidine, omeprazole, esomeprazole  -certain medicines that treat or prevent blood clots like warfarin, enoxaparin, dalteparin, apixaban, dabigatran, rivaroxaban, ticlopidine  -chloramphenicol  -cilostazol  -fluvastatin  -isoniazid  -modafinil  -nicardipine  -NSAIDS, medicines for pain and inflammation, like ibuprofen or  naproxen  -quinine  -repaglinide  -tamoxifen  -tolbutamide  -topiramate  -torsemide  This list may not describe all possible interactions. Give your health care provider a list of all the medicines, herbs, non-prescription drugs, or dietary supplements you use. Also tell them if you smoke, drink alcohol, or use illegal drugs. Some items may interact with your medicine.  What should I watch for while using this medicine?  Visit your doctor or health care professional for regular check ups. Do not stop taking your medicine unless your doctor tells you to.  Notify your doctor or health care professional and seek emergency treatment if you develop breathing problems; changes in vision; chest pain; severe, sudden headache; pain, swelling, warmth in the leg; trouble speaking; sudden numbness or weakness of the face, arm or leg. These can be signs that your condition has gotten worse.  If you are going to have surgery or dental work, tell your doctor or health care professional that you are taking this medicine.  Certain genetic factors may reduce the effect of this medicine. Your doctor may use genetic tests to determine treatment.  What side effects may I notice from receiving this medicine?  Side effects that you should report to your doctor or health care professional as soon as possible:  -allergic reactions like skin rash, itching or hives, swelling of the face, lips, or tongue  -signs and symptoms of bleeding such as bloody or black, tarry stools; red or dark-brown urine; spitting up blood or brown material that looks like coffee grounds; red spots on the skin; unusual bruising or bleeding from the eye, gums, or nose  -signs and symptoms of a blood clot such as breathing problems; changes in vision; chest pain; severe, sudden headache; pain, swelling, warmth in the leg; trouble speaking; sudden numbness or weakness of the face, arm or leg  Side effects that usually do not require medical attention (report to your  doctor or health care professional if they continue or are bothersome):  -constipation  -diarrhea  -headache  -upset stomach  This list may not describe all possible side effects. Call your doctor for medical advice about side effects. You may report side effects to FDA at 2-811-FDA-6492.  Where should I keep my medicine?  Keep out of the reach of children.  Store at room temperature of 59 to 86 degrees F (15 to 30 degrees C). Throw away any unused medicine after the expiration date.  NOTE: This sheet is a summary. It may not cover all possible information. If you have questions about this medicine, talk to your doctor, pharmacist, or health care provider.  © 2018 [employer] Standard (2016-09-22 10:00:44)    Aspirin, ASA oral tablets  What is this medicine?  ASPIRIN (AS pir in) is a pain reliever. It is used to treat mild pain and fever. This medicine is also used as directed by a doctor to prevent and to treat heart attacks, to prevent strokes, and to treat arthritis or inflammation.  This medicine may be used for other purposes; ask your health care provider or pharmacist if you have questions.  COMMON BRAND NAME(S): Aspir-Low, Aspir-Fatou, Aspirtab, Tressa Advanced Aspirin, Tressa Aspirin, Tressa Aspirin Extra Strength, Tressa Aspirin Plus, Tressa Extra Strength, Tressa Extra Strength Plus, Tressa Genuine Aspirin, Tressa Womens Aspirin, Bufferin, Bufferin Extra Strength, Bufferin Low Dose  What should I tell my health care provider before I take this medicine?  They need to know if you have any of these conditions:  -anemia  -asthma  -bleeding problems  -child with chickenpox, the flu, or other viral infection  -diabetes  -gout  -if you frequently drink alcohol containing drinks  -kidney disease  -liver disease  -low level of vitamin K  -lupus  -smoke tobacco  -stomach ulcers or other problems  -an unusual or allergic reaction to aspirin, tartrazine dye, other medicines, dyes, or preservatives  -pregnant or trying to  get pregnant  -breast-feeding  How should I use this medicine?  Take this medicine by mouth with a glass of water. Follow the directions on the package or prescription label. You can take this medicine with or without food. If it upsets your stomach, take it with food. Do not take your medicine more often than directed.  Talk to your pediatrician regarding the use of this medicine in children. While this drug may be prescribed for children as young as 12 years of age for selected conditions, precautions do apply. Children and teenagers should not use this medicine to treat chicken pox or flu symptoms unless directed by a doctor.  Patients over 65 years old may have a stronger reaction and need a smaller dose.  Overdosage: If you think you have taken too much of this medicine contact a poison control center or emergency room at once.  NOTE: This medicine is only for you. Do not share this medicine with others.  What if I miss a dose?  If you are taking this medicine on a regular schedule and miss a dose, take it as soon as you can. If it is almost time for your next dose, take only that dose. Do not take double or extra doses.  What may interact with this medicine?  Do not take this medicine with any of the following medications:  -cidofovir  -ketorolac  -probenecid  This medicine may also interact with the following medications:  -alcohol  -alendronate  -bismuth subsalicylate  -flavocoxid  -herbal supplements like feverfew, garlic, adelina, ginkgo biloba, horse chestnut  -medicines for diabetes or glaucoma like acetazolamide, methazolamide  -medicines for gout  -medicines that treat or prevent blood clots like enoxaparin, heparin, ticlopidine, warfarin  -other aspirin and aspirin-like medicines  -NSAIDs, medicines for pain and inflammation, like ibuprofen or naproxen  -pemetrexed  -sulfinpyrazone  -varicella live vaccine  This list may not describe all possible interactions. Give your health care provider a list of  all the medicines, herbs, non-prescription drugs, or dietary supplements you use. Also tell them if you smoke, drink alcohol, or use illegal drugs. Some items may interact with your medicine.  What should I watch for while using this medicine?  If you are treating yourself for pain, tell your doctor or health care professional if the pain lasts more than 10 days, if it gets worse, or if there is a new or different kind of pain. Tell your doctor if you see redness or swelling. Also, check with your doctor if you have a fever that lasts for more than 3 days. Only take this medicine to prevent heart attacks or blood clotting if prescribed by your doctor or health care professional.  Do not take aspirin or aspirin-like medicines with this medicine. Too much aspirin can be dangerous. Always read the labels carefully.  This medicine can irritate your stomach or cause bleeding problems. Do not smoke cigarettes or drink alcohol while taking this medicine. Do not lie down for 30 minutes after taking this medicine to prevent irritation to your throat.  If you are scheduled for any medical or dental procedure, tell your healthcare provider that you are taking this medicine. You may need to stop taking this medicine before the procedure.  This medicine may be used to treat migraines. If you take migraine medicines for 10 or more days a month, your migraines may get worse. Keep a diary of headache days and medicine use. Contact your healthcare professional if your migraine attacks occur more frequently.  What side effects may I notice from receiving this medicine?  Side effects that you should report to your doctor or health care professional as soon as possible:  -allergic reactions like skin rash, itching or hives, swelling of the face, lips, or tongue  -breathing problems  -changes in hearing, ringing in the ears  -confusion  -general ill feeling or flu-like symptoms  -pain on swallowing  -redness, blistering, peeling or  loosening of the skin, including inside the mouth or nose  -signs and symptoms of bleeding such as bloody or black, tarry stools; red or dark-brown urine; spitting up blood or brown material that looks like coffee grounds; red spots on the skin; unusual bruising or bleeding from the eye, gums, or nose  -trouble passing urine or change in the amount of urine  -unusually weak or tired  -yellowing of the eyes or skin  Side effects that usually do not require medical attention (report to your doctor or health care professional if they continue or are bothersome):  -diarrhea or constipation  -headache  -nausea, vomiting  -stomach gas, heartburn  This list may not describe all possible side effects. Call your doctor for medical advice about side effects. You may report side effects to FDA at 8-302-FDA-5547.  Where should I keep my medicine?  Keep out of the reach of children.  Store at room temperature between 15 and 30 degrees C (59 and 86 degrees F). Protect from heat and moisture. Do not use this medicine if it has a strong vinegar smell. Throw away any unused medicine after the expiration date.  NOTE: This sheet is a summary. It may not cover all possible information. If you have questions about this medicine, talk to your doctor, pharmacist, or health care provider.  © 2018 [employer]/Gold Standard (2014-08-19 11:30:31)    Losartan tablets  What is this medicine?  LOSARTAN (janine KLAUDIA tan) is used to treat high blood pressure and to reduce the risk of stroke in certain patients. This drug also slows the progression of kidney disease in patients with diabetes.  This medicine may be used for other purposes; ask your health care provider or pharmacist if you have questions.  COMMON BRAND NAME(S): Cozaar  What should I tell my health care provider before I take this medicine?  They need to know if you have any of these conditions:  -heart failure  -kidney or liver disease  -an unusual or allergic reaction to losartan, other  medicines, foods, dyes, or preservatives  -pregnant or trying to get pregnant  -breast-feeding  How should I use this medicine?  Take this medicine by mouth with a glass of water. Follow the directions on the prescription label. This medicine can be taken with or without food. Take your doses at regular intervals. Do not take your medicine more often than directed.  Talk to your pediatrician regarding the use of this medicine in children. Special care may be needed.  Overdosage: If you think you have taken too much of this medicine contact a poison control center or emergency room at once.  NOTE: This medicine is only for you. Do not share this medicine with others.  What if I miss a dose?  If you miss a dose, take it as soon as you can. If it is almost time for your next dose, take only that dose. Do not take double or extra doses.  What may interact with this medicine?  -blood pressure medicines  -diuretics, especially triamterene, spironolactone, or amiloride  -fluconazole  -NSAIDs, medicines for pain and inflammation, like ibuprofen or naproxen  -potassium salts or potassium supplements  -rifampin  This list may not describe all possible interactions. Give your health care provider a list of all the medicines, herbs, non-prescription drugs, or dietary supplements you use. Also tell them if you smoke, drink alcohol, or use illegal drugs. Some items may interact with your medicine.  What should I watch for while using this medicine?  Visit your doctor or health care professional for regular checks on your progress. Check your blood pressure as directed. Ask your doctor or health care professional what your blood pressure should be and when you should contact him or her. Call your doctor or health care professional if you notice an irregular or fast heart beat.  Women should inform their doctor if they wish to become pregnant or think they might be pregnant. There is a potential for serious side effects to an  unborn child, particularly in the second or third trimester. Talk to your health care professional or pharmacist for more information.  You may get drowsy or dizzy. Do not drive, use machinery, or do anything that needs mental alertness until you know how this drug affects you. Do not stand or sit up quickly, especially if you are an older patient. This reduces the risk of dizzy or fainting spells. Alcohol can make you more drowsy and dizzy. Avoid alcoholic drinks.  Avoid salt substitutes unless you are told otherwise by your doctor or health care professional.  Do not treat yourself for coughs, colds, or pain while you are taking this medicine without asking your doctor or health care professional for advice. Some ingredients may increase your blood pressure.  What side effects may I notice from receiving this medicine?  Side effects that you should report to your doctor or health care professional as soon as possible:  -confusion, dizziness, light headedness or fainting spells  -decreased amount of urine passed  -difficulty breathing or swallowing, hoarseness, or tightening of the throat  -fast or irregular heart beat, palpitations, or chest pain  -skin rash, itching  -swelling of your face, lips, tongue, hands, or feet  Side effects that usually do not require medical attention (report to your doctor or health care professional if they continue or are bothersome):  -cough  -decreased sexual function or desire  -headache  -nasal congestion or stuffiness  -nausea or stomach pain  -sore or cramping muscles  This list may not describe all possible side effects. Call your doctor for medical advice about side effects. You may report side effects to FDA at 6-898-FDA-8813.  Where should I keep my medicine?  Keep out of the reach of children.  Store at room temperature between 15 and 30 degrees C (59 and 86 degrees F). Protect from light. Keep container tightly closed. Throw away any unused medicine after the expiration  date.  NOTE: This sheet is a summary. It may not cover all possible information. If you have questions about this medicine, talk to your doctor, pharmacist, or health care provider.  © 2018 [employer]/Gold Standard (2009-02-27 16:42:18)    Metoprolol tablets  What is this medicine?  METOPROLOL (me TOE proe lole) is a beta-blocker. Beta-blockers reduce the workload on the heart and help it to beat more regularly. This medicine is used to treat high blood pressure and to prevent chest pain. It is also used to after a heart attack and to prevent an additional heart attack from occurring.  This medicine may be used for other purposes; ask your health care provider or pharmacist if you have questions.  COMMON BRAND NAME(S): Lopressor  What should I tell my health care provider before I take this medicine?  They need to know if you have any of these conditions:  -diabetes  -heart or vessel disease like slow heart rate, worsening heart failure, heart block, sick sinus syndrome or Raynaud's disease  -kidney disease  -liver disease  -lung or breathing disease, like asthma or emphysema  -pheochromocytoma  -thyroid disease  -an unusual or allergic reaction to metoprolol, other beta-blockers, medicines, foods, dyes, or preservatives  -pregnant or trying to get pregnant  -breast-feeding  How should I use this medicine?  Take this medicine by mouth with a drink of water. Follow the directions on the prescription label. Take this medicine immediately after meals. Take your doses at regular intervals. Do not take more medicine than directed. Do not stop taking this medicine suddenly. This could lead to serious heart-related effects.  Talk to your pediatrician regarding the use of this medicine in children. Special care may be needed.  Overdosage: If you think you have taken too much of this medicine contact a poison control center or emergency room at once.  NOTE: This medicine is only for you. Do not share this medicine with  others.  What if I miss a dose?  If you miss a dose, take it as soon as you can. If it is almost time for your next dose, take only that dose. Do not take double or extra doses.  What may interact with this medicine?  This medicine may interact with the following medications:  -certain medicines for blood pressure, heart disease, irregular heart beat  -certain medicines for depression like monoamine oxidase (MAO) inhibitors, fluoxetine, or paroxetine  -clonidine  -dobutamine  -epinephrine  -isoproterenol  -reserpine  This list may not describe all possible interactions. Give your health care provider a list of all the medicines, herbs, non-prescription drugs, or dietary supplements you use. Also tell them if you smoke, drink alcohol, or use illegal drugs. Some items may interact with your medicine.  What should I watch for while using this medicine?  Visit your doctor or health care professional for regular check ups. Contact your doctor right away if your symptoms worsen. Check your blood pressure and pulse rate regularly. Ask your health care professional what your blood pressure and pulse rate should be, and when you should contact them.  You may get drowsy or dizzy. Do not drive, use machinery, or do anything that needs mental alertness until you know how this medicine affects you. Do not sit or stand up quickly, especially if you are an older patient. This reduces the risk of dizzy or fainting spells. Contact your doctor if these symptoms continue. Alcohol may interfere with the effect of this medicine. Avoid alcoholic drinks.  What side effects may I notice from receiving this medicine?  Side effects that you should report to your doctor or health care professional as soon as possible:  -allergic reactions like skin rash, itching or hives  -cold or numb hands or feet  -depression  -difficulty breathing  -faint  -fever with sore throat  -irregular heartbeat, chest pain  -rapid weight gain  -swollen legs or  ankles  Side effects that usually do not require medical attention (report to your doctor or health care professional if they continue or are bothersome):  -anxiety or nervousness  -change in sex drive or performance  -dry skin  -headache  -nightmares or trouble sleeping  -short term memory loss  -stomach upset or diarrhea  -unusually tired  This list may not describe all possible side effects. Call your doctor for medical advice about side effects. You may report side effects to FDA at 1-931-OFC-6559.  Where should I keep my medicine?  Keep out of the reach of children.  Store at room temperature between 15 and 30 degrees C (59 and 86 degrees F). Throw away any unused medicine after the expiration date.  NOTE: This sheet is a summary. It may not cover all possible information. If you have questions about this medicine, talk to your doctor, pharmacist, or health care provider.  © 2018 [employer] (2014-08-22 14:40:36)    Rosuvastatin Tablets  What is this medicine?  ROSUVASTATIN (wendy MARIELLE va sta tin) is known as a HMG-CoA reductase inhibitor or 'statin'. It lowers cholesterol and triglycerides in the blood. This drug may also reduce the risk of heart attack, stroke, or other health problems in patients with risk factors for heart disease. Diet and lifestyle changes are often used with this drug.  This medicine may be used for other purposes; ask your health care provider or pharmacist if you have questions.  COMMON BRAND NAME(S): Crestor  What should I tell my health care provider before I take this medicine?  They need to know if you have any of these conditions:  -frequently drink alcoholic beverages  -kidney disease  -liver disease  -muscle aches or weakness  -other medical condition  -an unusual or allergic reaction to rosuvastatin, other medicines, foods, dyes, or preservatives  -pregnant or trying to get pregnant  -breast-feeding  How should I use this medicine?  Take this medicine by mouth with a  glass of water. Follow the directions on the prescription label. Do not cut, crush or chew this medicine. You can take this medicine with or without food. Take your doses at regular intervals. Do not take your medicine more often than directed.  Talk to your pediatrician regarding the use of this medicine in children. While this drug may be prescribed for children as young as 7 years old for selected conditions, precautions do apply.  Overdosage: If you think you have taken too much of this medicine contact a poison control center or emergency room at once.  NOTE: This medicine is only for you. Do not share this medicine with others.  What if I miss a dose?  If you miss a dose, take it as soon as you can. Do not take 2 doses within 12 hours of each other. If there are less than 12 hours until your next dose, take only that dose. Do not take double or extra doses.  What may interact with this medicine?  Do not take this medicine with any of the following medications:  -herbal medicines like red yeast rice  This medicine may also interact with the following medications:  -alcohol  -antacids containing aluminum hydroxide or magnesium hydroxide  -cyclosporine  -other medicines for high cholesterol  -some medicines for HIV infection  -warfarin  This list may not describe all possible interactions. Give your health care provider a list of all the medicines, herbs, non-prescription drugs, or dietary supplements you use. Also tell them if you smoke, drink alcohol, or use illegal drugs. Some items may interact with your medicine.  What should I watch for while using this medicine?  Visit your doctor or health care professional for regular check-ups. You may need regular tests to make sure your liver is working properly.  Tell your doctor or health care professional right away if you get any unexplained muscle pain, tenderness, or weakness, especially if you also have a fever and tiredness. Your doctor or health care  professional may tell you to stop taking this medicine if you develop muscle problems. If your muscle problems do not go away after stopping this medicine, contact your health care professional.  This medicine may affect blood sugar levels. If you have diabetes, check with your doctor or health care professional before you change your diet or the dose of your diabetic medicine.  Avoid taking antacids containing aluminum, calcium or magnesium within 2 hours of taking this medicine.  This drug is only part of a total heart-health program. Your doctor or a dietician can suggest a low-cholesterol and low-fat diet to help. Avoid alcohol and smoking, and keep a proper exercise schedule.  Do not use this drug if you are pregnant or breast-feeding. Serious side effects to an unborn child or to an infant are possible. Talk to your doctor or pharmacist for more information.  What side effects may I notice from receiving this medicine?  Side effects that you should report to your doctor or health care professional as soon as possible:  -allergic reactions like skin rash, itching or hives, swelling of the face, lips, or tongue  -dark urine  -fever  -joint pain  -muscle cramps, pain  -redness, blistering, peeling or loosening of the skin, including inside the mouth  -trouble passing urine or change in the amount of urine  -unusually weak or tired  -yellowing of the eyes or skin  Side effects that usually do not require medical attention (report to your doctor or health care professional if they continue or are bothersome):  -constipation  -heartburn  -nausea  -stomach gas, pain, upset  This list may not describe all possible side effects. Call your doctor for medical advice about side effects. You may report side effects to FDA at 2-365-FDA-9134.  Where should I keep my medicine?  Keep out of the reach of children.  Store at room temperature between 20 and 25 degrees C (68 and 77 degrees F). Keep container tightly closed  (protect from moisture). Throw away any unused medicine after the expiration date.  NOTE: This sheet is a summary. It may not cover all possible information. If you have questions about this medicine, talk to your doctor, pharmacist, or health care provider.  © 2018 [employer] (2016-06-02 13:33:08)    Amiodarone tablets  What is this medicine?  AMIODARONE (a PENNY oh da kimberlee) is an antiarrhythmic drug. It helps make your heart beat regularly. Because of the side effects caused by this medicine, it is only used when other medicines have not worked. It is usually used for heartbeat problems that may be life threatening.  This medicine may be used for other purposes; ask your health care provider or pharmacist if you have questions.  COMMON BRAND NAME(S): Cordarone, Pacerone  What should I tell my health care provider before I take this medicine?  They need to know if you have any of these conditions:  -liver disease  -lung disease  -other heart problems  -thyroid disease  -an unusual or allergic reaction to amiodarone, iodine, other medicines, foods, dyes, or preservatives  -pregnant or trying to get pregnant  -breast-feeding  How should I use this medicine?  Take this medicine by mouth with a glass of water. Follow the directions on the prescription label. You can take this medicine with or without food. However, you should always take it the same way each time. Take your doses at regular intervals. Do not take your medicine more often than directed. Do not stop taking except on the advice of your doctor or health care professional.  A special MedGuide will be given to you by the pharmacist with each prescription and refill. Be sure to read this information carefully each time.  Talk to your pediatrician regarding the use of this medicine in children. Special care may be needed.  Overdosage: If you think you have taken too much of this medicine contact a poison control center or emergency room at  once.  NOTE: This medicine is only for you. Do not share this medicine with others.  What if I miss a dose?  If you miss a dose, take it as soon as you can. If it is almost time for your next dose, take only that dose. Do not take double or extra doses.  What may interact with this medicine?  Do not take this medicine with any of the following medications:  -abarelix  -apomorphine  -arsenic trioxide  -certain antibiotics like erythromycin, gemifloxacin, levofloxacin, pentamidine  -certain medicines for depression like amoxapine, tricyclic antidepressants  -certain medicines for fungal infections like fluconazole, itraconazole, ketoconazole, posaconazole, voriconazole  -certain medicines for irregular heart beat like disopyramide, dofetilide, dronedarone, ibutilide, propafenone, sotalol  -certain medicines for malaria like chloroquine, halofantrine  -cisapride  -droperidol  -haloperidol  -hawthorn  -maprotiline  -methadone  -phenothiazines like chlorpromazine, mesoridazine, thioridazine  -pimozide  -ranolazine  -red yeast rice  -vardenafil  -ziprasidone  This medicine may also interact with the following medications:  -antiviral medicines for HIV or AIDS  -certain medicines for blood pressure, heart disease, irregular heart beat  -certain medicines for cholesterol like atorvastatin, cerivastatin, lovastatin, simvastatin  -certain medicines for hepatitis C like sofosbuvir and ledipasvir; sofosbuvir  -certain medicines for seizures like phenytoin  -certain medicines for thyroid problems  -certain medicines that treat or prevent blood clots like warfarin  -cholestyramine  -cimetidine  -clopidogrel  -cyclosporine  -dextromethorphan  -diuretics  -fentanyl  -general anesthetics  -grapefruit juice  -lidocaine  -loratadine  -methotrexate  -other medicines that prolong the QT interval (cause an abnormal heart rhythm)  -procainamide  -quinidine  -rifabutin, rifampin, or rifapentine  -[place] Wort  -trazodone  This list may  not describe all possible interactions. Give your health care provider a list of all the medicines, herbs, non-prescription drugs, or dietary supplements you use. Also tell them if you smoke, drink alcohol, or use illegal drugs. Some items may interact with your medicine.  What should I watch for while using this medicine?  Your condition will be monitored closely when you first begin therapy. Often, this drug is first started in a hospital or other monitored health care setting. Once you are on maintenance therapy, visit your doctor or health care professional for regular checks on your progress. Because your condition and use of this medicine carry some risk, it is a good idea to carry an identification card, necklace or bracelet with details of your condition, medications, and doctor or health care professional.  You may get drowsy or dizzy. Do not drive, use machinery, or do anything that needs mental alertness until you know how this medicine affects you. Do not stand or sit up quickly, especially if you are an older patient. This reduces the risk of dizzy or fainting spells.  This medicine can make you more sensitive to the sun. Keep out of the sun. If you cannot avoid being in the sun, wear protective clothing and use sunscreen. Do not use sun lamps or tanning beds/booths.  You should have regular eye exams before and during treatment. Call your doctor if you have blurred vision, see halos, or your eyes become sensitive to light. Your eyes may get dry. It may be helpful to use a lubricating eye solution or artificial tears solution.  If you are going to have surgery or a procedure that requires contrast dyes, tell your doctor or health care professional that you are taking this medicine.  What side effects may I notice from receiving this medicine?  Side effects that you should report to your doctor or health care professional as soon as possible:  -allergic reactions like skin rash, itching or hives,  swelling of the face, lips, or tongue  -blue-gray coloring of the skin  -blurred vision, seeing blue green halos, increased sensitivity of the eyes to light  -breathing problems  -chest pain  -dark urine  -fast, irregular heartbeat  -feeling faint or light-headed  -intolerance to heat or cold  -nausea or vomiting  -pain and swelling of the scrotum  -pain, tingling, numbness in feet, hands  -redness, blistering, peeling or loosening of the skin, including inside the mouth  -spitting up blood  -stomach pain  -sweating  -unusual or uncontrolled movements of body  -unusually weak or tired  -weight gain or loss  -yellowing of the eyes or skin  Side effects that usually do not require medical attention (report to your doctor or health care professional if they continue or are bothersome):  -change in sex drive or performance  -constipation  -dizziness  -headache  -loss of appetite  -trouble sleeping  This list may not describe all possible side effects. Call your doctor for medical advice about side effects. You may report side effects to FDA at 7-898-FDA-5279.  Where should I keep my medicine?  Keep out of the reach of children.  Store at room temperature between 20 and 25 degrees C (68 and 77 degrees F). Protect from light. Keep container tightly closed. Throw away any unused medicine after the expiration date.  NOTE: This sheet is a summary. It may not cover all possible information. If you have questions about this medicine, talk to your doctor, pharmacist, or health care provider.  © 2018 [employer] (2015-03-23 19:48:11)    Acetaminophen; Hydrocodone tablets or capsules  What is this medicine?  ACETAMINOPHEN; HYDROCODONE (a set a PENNY elisha fen; margaret droe KOE done) is a pain reliever. It is used to treat moderate to severe pain.  This medicine may be used for other purposes; ask your health care provider or pharmacist if you have questions.  COMMON BRAND NAME(S): Anexsia, Moises HC, Ceta-Plus, Co-Gesic,  Comfortpak, Dolagesic, Dolorex Forte, DuoCet, Hydrocet, Hydrogesic, Lorcet, Lorcet HD, Lorcet Plus, Lortab, Margesic H, Maxidone, Norco, Polygesic, Stagesic, Vanacet, Verdrocet, Vicodin, Vicodin ES, Vicodin HP, Xodol, Zydone  What should I tell my health care provider before I take this medicine?  They need to know if you have any of these conditions:  -brain tumor  -Crohn's disease, inflammatory bowel disease, or ulcerative colitis  -drug abuse or addiction  -head injury  -heart or circulation problems  -if you often drink alcohol  -kidney disease or problems going to the bathroom  -liver disease  -lung disease, asthma, or breathing problems  -an unusual or allergic reaction to acetaminophen, hydrocodone, other opioid analgesics, other medicines, foods, dyes, or preservatives  -pregnant or trying to get pregnant  -breast-feeding  How should I use this medicine?  Take this medicine by mouth with a glass of water. Follow the directions on the prescription label. You can take it with or without food. If it upsets your stomach, take it with food. Do not take your medicine more often than directed.  A special MedGuide will be given to you by the pharmacist with each prescription and refill. Be sure to read this information carefully each time.  Talk to your pediatrician regarding the use of this medicine in children. Special care may be needed.  Overdosage: If you think you have taken too much of this medicine contact a poison control center or emergency room at once.  NOTE: This medicine is only for you. Do not share this medicine with others.  What if I miss a dose?  If you miss a dose, take it as soon as you can. If it is almost time for your next dose, take only that dose. Do not take double or extra doses.  What may interact with this medicine?  This medicine may interact with the following medications:  -alcohol  -antiviral medicines for HIV or AIDS  -atropine  -antihistamines for allergy, cough and cold  -certain  antibiotics like erythromycin, clarithromycin  -certain medicines for anxiety or sleep  -certain medicines for bladder problems like oxybutynin, tolterodine  -certain medicines for depression like amitriptyline, fluoxetine, sertraline  -certain medicines for fungal infections like ketoconazole and itraconazole  -certain medicines for Parkinson's disease like benztropine, trihexyphenidyl  -certain medicines for seizures like carbamazepine, phenobarbital, phenytoin, primidone  -certain medicines for stomach problems like dicyclomine, hyoscyamine  -certain medicines for travel sickness like scopolamine  -general anesthetics like halothane, isoflurane, methoxyflurane, propofol  -ipratropium  -local anesthetics like lidocaine, pramoxine, tetracaine  -MAOIs like Carbex, Eldepryl, Marplan, Nardil, and Parnate  -medicines that relax muscles for surgery  -other medicines with acetaminophen  -other narcotic medicines for pain or cough  -phenothiazines like chlorpromazine, mesoridazine, prochlorperazine, thioridazine  -rifampin  This list may not describe all possible interactions. Give your health care provider a list of all the medicines, herbs, non-prescription drugs, or dietary supplements you use. Also tell them if you smoke, drink alcohol, or use illegal drugs. Some items may interact with your medicine.  What should I watch for while using this medicine?  Tell your doctor or health care professional if your pain does not go away, if it gets worse, or if you have new or a different type of pain. You may develop tolerance to the medicine. Tolerance means that you will need a higher dose of the medicine for pain relief. Tolerance is normal and is expected if you take the medicine for a long time.  Do not suddenly stop taking your medicine because you may develop a severe reaction. Your body becomes used to the medicine. This does NOT mean you are addicted. Addiction is a behavior related to getting and using a drug for a  non-medical reason. If you have pain, you have a medical reason to take pain medicine. Your doctor will tell you how much medicine to take. If your doctor wants you to stop the medicine, the dose will be slowly lowered over time to avoid any side effects.  There are different types of narcotic medicines (opiates). If you take more than one type at the same time or if you are taking another medicine that also causes drowsiness, you may have more side effects. Give your health care provider a list of all medicines you use. Your doctor will tell you how much medicine to take. Do not take more medicine than directed. Call emergency for help if you have problems breathing or unusual sleepiness.  Do not take other medicines that contain acetaminophen with this medicine. Always read labels carefully. If you have questions, ask your doctor or pharmacist.  If you take too much acetaminophen get medical help right away. Too much acetaminophen can be very dangerous and cause liver damage. Even if you do not have symptoms, it is important to get help right away.  You may get drowsy or dizzy. Do not drive, use machinery, or do anything that needs mental alertness until you know how this medicine affects you. Do not stand or sit up quickly, especially if you are an older patient. This reduces the risk of dizzy or fainting spells. Alcohol may interfere with the effect of this medicine. Avoid alcoholic drinks.  The medicine will cause constipation. Try to have a bowel movement at least every 2 to 3 days. If you do not have a bowel movement for 3 days, call your doctor or health care professional.  Your mouth may get dry. Chewing sugarless gum or sucking hard candy, and drinking plenty of water may help. Contact your doctor if the problem does not go away or is severe.  What side effects may I notice from receiving this medicine?  Side effects that you should report to your doctor or health care professional as soon as  possible:  -allergic reactions like skin rash, itching or hives, swelling of the face, lips, or tongue  -breathing problems  -confusion  -redness, blistering, peeling or loosening of the skin, including inside the mouth  -signs and symptoms of low blood pressure like dizziness; feeling faint or lightheaded, falls; unusually weak or tired  -trouble passing urine or change in the amount of urine  -yellowing of the eyes or skin  Side effects that usually do not require medical attention (report to your doctor or health care professional if they continue or are bothersome):  -constipation  -dry mouth  -nausea, vomiting  -tiredness  This list may not describe all possible side effects. Call your doctor for medical advice about side effects. You may report side effects to FDA at 6-878-FDA-3528.  Where should I keep my medicine?  Keep out of the reach of children. This medicine can be abused. Keep your medicine in a safe place to protect it from theft. Do not share this medicine with anyone. Selling or giving away this medicine is dangerous and against the law.  This medicine may cause accidental overdose and death if it taken by other adults, children, or pets. Mix any unused medicine with a substance like cat litter or coffee grounds. Then throw the medicine away in a sealed container like a sealed bag or a coffee can with a lid. Do not use the medicine after the expiration date.  Store at room temperature between 15 and 30 degrees C (59 and 86 degrees F).  NOTE: This sheet is a summary. It may not cover all possible information. If you have questions about this medicine, talk to your doctor, pharmacist, or health care provider.  © 2018 [employer] (2016-09-09 10:02:16)            Discharge Instructions    Discharged to home by car with relative. Discharged via wheelchair, hospital escort: Yes.  Special equipment needed: Not Applicable    Be sure to schedule a follow-up appointment with your primary care  doctor or any specialists as instructed.     Discharge Plan:   Influenza Vaccine Indication: (S)  (post [facility] patient)    I understand that a diet low in cholesterol, fat, and sodium is recommended for good health. Unless I have been given specific instructions below for another diet, I accept this instruction as my diet prescription.   Other diet: cardiac    Special Instructions: heart surgery and acute coronary syndrome    [facility] Discharge Instructions    Activity:  1. NO driving for 4 weeks after surgery. You may ride as a passenger.  2. NO lifting more than 10 pounds for 6 weeks.  3. For the next 6 weeks, keep your elbows close to your body and move within a pain-free motion when lifting, pushing or pulling.  Do not stretch both arms backwards at the same time.    4. Walk at least 4 times per day, there is no maximum.  5. Other physical activities (sex, housework, gardening, etc.) are okay after 4 weeks.  6. Continue using incentive spirometer for 2 weeks.  If you are going home on oxygen and you were not on oxygen prior to surgery, keep using until you are oxygen free.  7. Weigh yourself daily.  Call your Cardiologist for a weight gain of 2 or more pounds within 48 hours.    Incision Care:  1. SHOWER EVERYDAY-no baths. Make sure to clean your incision(s) twice daily with plain, perfume and dye-free soap.  Then pat incision(s) dry with clean towel. No creams or lotions on your incision(s).    2. If you are discharging with a Prevena bandage on your chest, you or your home health nurse may remove the bandage 7 days after surgery, or sooner if the battery runs out or the device alarms. When the battery runs out, the bandage will lose suction and it will puff up.  To remove, slowly roll down the edges of the bandage. Throw away the entire bandage and the battery pack.  Keep the incision open to air and clean twice daily with soap and water.  3. If there is any increased redness or swelling, separation  of the incision line, or thick drainage from any of your incisions, call [facility].    4. Continue to wear your NICOLE Stockings for 4 weeks. You may take them off when you are in bed or when your legs are elevated.    General Instructions:  1. You have been referred to Cardiac Rehab.  You can start Cardiac Rehab 30 days after surgery.  If you do not have an appointment at the time of discharge call [phone] to schedule an appointment.  2. Your Primary Care Doctor typically handles home oxygen. Oxygen may be stopped when your oxygen level is consistently greater than 90.  Check with your Primary Care Doctor if you are unsure.  3. Take all of your medications (including pain medications) as prescribed.  Taking medications other than prescribed can result in serious injury.    For Patients Discharged with Narcotic Pain Medication:   1. If a refill is needed, understand that only 1 refill will be provided and you must come to the [facility]’ office for an appointment (72 hours’ notice is required to schedule and there are no weekend appointments).  2. If the pain medications you are discharged on are not working, you will need to bring your remaining prescription into the office in order to receive a new prescription.  3. If you were taking narcotics prior to your heart surgery, [facility] will provide you with one prescription and additional medications will need to be provided by your pain management doctor.  4. Do not drink alcohol while taking narcotics.  5. Lost or stolen medications will not be refilled.  If medications are stolen, report to law enforcement.    Contact [facility] at [phone] if you have any questions.    Acute Coronary Syndrome (ACS) is a diagnosis that encompasses cardiac-related chest pain and heart attack. ACS occurs when the blood flow to the heart muscle is severely reduced or cut off completely due to a slow process called atherosclerosis.   Atherosclerosis is a disease in which the coronary arteries become narrow from a buildup of fat, cholesterol, and other substances that combine to form plaque. If the plaque breaks, a blood clot will form and block the blood flow to the heart muscle. This lack of blood flow can cause damage or death to the heart muscle which is called a heart attack or Myocardial Infarction (MI). There are two different types of MIs:  ST Elevation Myocardial Infarction or STEMI (the most severe type of heart attack) and Non-ST Elevation Myocardial Infarction or NSTEMI.    Treatment Plan:  · Cardiac Diet  - Low fat, low salt, low cholesterol   · Cardiac Rehab  - Your doctor has ordered you a referral to [facility].  Call [phone] to schedule an appointment.  · Attend my follow-up appointment with my Cardiologist.  · Take my medications as prescribed by my doctor  · Exercise daily  · Lower my bad cholesterol and raise my good cholesterol and lower my blood pressure    Medications:  Certain medications are used to treat ACS.  Remember to always take medications as prescribed and never stop talking medications unless told by your doctor.    · Is patient discharged on Warfarin / Coumadin?   No     Depression / Suicide Risk    As you are discharged from this [facility] facility, it is important to learn how to keep safe from harming yourself.    Recognize the warning signs:  · Abrupt changes in personality, positive or negative- including increase in energy   · Giving away possessions  · Change in eating patterns- significant weight changes-  positive or negative  · Change in sleeping patterns- unable to sleep or sleeping all the time   · Unwillingness or inability to communicate  · Depression  · Unusual sadness, discouragement and loneliness  · Talk of wanting to die  · Neglect of personal appearance   · Rebelliousness- reckless behavior  · Withdrawal from people/activities they love  · Confusion- inability to concentrate     If you  or a loved one observes any of these behaviors or has concerns about self-harm, here's what you can do:  · Talk about it- your feelings and reasons for harming yourself  · Remove any means that you might use to hurt yourself (examples: pills, rope, extension cords, firearm)  · Get professional help from the community (Mental Health, Substance Abuse, psychological counseling)  · Do not be alone:Call your Safe Contact- someone whom you trust who will be there for you.  · Call your local CRISIS HOTLINE 457-9517 or 075-171-0223  · Call your local Children's Mobile Crisis Response Team [place] (881) 181-0584 or www.Convore  · Call the toll free National Suicide Prevention Hotlines   · National Suicide Prevention Lifeline 719-463-KVDK (8148)  · National Hope Line Network 800-SUICIDE (835-5517)

## 2018-05-08 NOTE — PROGRESS NOTES
Cardiovascular Surgery Progress Note    Name: Jae Venegas  MRN: [id number]  : 1970  Admit Date: 2018 10:33 PM  Procedure:  Procedure(s) and Anesthesia Type:     * MULTIPLE CORONARY ARTERY BYPASS ENDO VEIN HARVEST - X4, BILATERAL INTERNAL MAMMARY ARTERY HARVEST - General     * LIMA - General  11 Day Post-Op    Vitals:  Patient Vitals for the past 8 hrs:   Temp SpO2 O2 Delivery Pulse BP NIBP Weight   18 0400 36 °C (96.8 °F) 96 % None (Room Air) 75 126/80 126/80 103.6 kg (228 lb 6.3 oz)     Temp (24hrs), Av.3 °C (97.4 °F), Min:36 °C (96.8 °F), Max:36.6 °C (97.8 °F)      Respiratory:    Respiration: (!) 27, Pulse Oximetry: 96 %     Chest Tube Drains:          Fluids:    Intake/Output Summary (Last 24 hours) at 18 0851  Last data filed at 18 1800   Gross per 24 hour   Intake              740 ml   Output              350 ml   Net              390 ml     Admit weight: Weight: 108.9 kg (240 lb)  Current weight: Weight: 103.6 kg (228 lb 6.3 oz) (18 0400)    Labs:  Recent Labs      18   0545  18   0350  18   0645   WBC  13.5*  14.7*  20.9*   RBC  3.90*  3.87*  4.31*   HEMOGLOBIN  11.6*  11.6*  12.9*   HEMATOCRIT  34.5*  34.5*  39.3*   MCV  88.5  89.1  91.2   MCH  29.7  30.0  29.9   MCHC  33.6*  33.6*  32.8*   RDW  42.2  42.2  44.1   PLATELETCT  207  232  316   MPV  10.3  10.1  10.2     Recent Labs      18   0545  18   0350  18   0645   NEUTSPOLYS  74.60*  73.80*  75.00*   LYMPHOCYTES  9.50*  11.60*  10.70*   MONOCYTES  9.80  9.00  8.10   EOSINOPHILS  4.40  3.50  3.40   BASOPHILS  0.40  0.40  0.70     Recent Labs      18   1223  18   0350  18   0530   SODIUM  137  137  138   POTASSIUM  3.4*  3.9  5.0   CHLORIDE  105  108  110   CO2  24  23  23   GLUCOSE  92  97  87   BUN  20  16  14   CREATININE  1.25  1.12  1.13   CALCIUM  7.7*  7.7*  8.0*           Medications:  • amiodarone  200 mg     • potassium chloride SA  40 mEq     • metoprolol   50 mg     • amiodarone  400 mg     • enoxaparin  40 mg     • aspirin EC  81 mg     • clopidogrel  75 mg     • losartan  50 mg     • rosuvastatin  20 mg         Exam:   Review of Systems   Constitutional: Negative.    HENT: Negative.    Eyes: Negative.    Respiratory: Negative.    Cardiovascular: Chest pain: MSK.   Gastrointestinal: Negative for abdominal pain, nausea and vomiting.   Genitourinary: Negative.    Musculoskeletal: Negative.    Skin: Negative.    Neurological: Negative.    Endo/Heme/Allergies: Negative.    Psychiatric/Behavioral: Negative.        Physical Exam   Constitutional: He is oriented to person, place, and time. He appears well-developed and well-nourished.   HENT:   Head: Normocephalic.   Eyes: Pupils are equal, round, and reactive to light.   Neck: Normal range of motion. No JVD present.   Cardiovascular: Normal rate, regular rhythm and normal heart sounds.    Pulmonary/Chest: Effort normal. He has decreased breath sounds in the right lower field and the left lower field.   Abdominal: Soft. He exhibits no distension. Bowel sounds are increased. There is tenderness in the right upper quadrant. There is no guarding.   Musculoskeletal: Normal range of motion.   Neurological: He is alert and oriented to person, place, and time.   Skin: Skin is warm and dry.   prevena to chest, EVH sites CDI   Psychiatric: He has a normal mood and affect. His behavior is normal. Judgment and thought content normal.       Quality Measures:   Quality-Core Measures   Bravo catheter::  No Bravo  Central line in place:  Need for access  DVT prophylaxis pharmacological::  Contraindicated - High bleeding risk  DVT prophylaxis - mechanical:  SCDs  Ulcer Prophylaxis::  Yes  Assessed for rehabilitation services:  Patient returned to prior level of function, rehabilitation not indicated at this time    Assessment/Plan:  POD 1 HDS on low dose epi.  NSR. Vented, ABGs good, weaning to extubated this AM. CT output min, no  airlekas.  Will keep CTs/gill today.  Plan to start diuresis once BP stable off epi.  Continuing ABX x 72 hours for bilateral ELIANE harvests.  CPM.  POD 2 HDS< NSR (afib with RVR this AM, converted on amio gtt protocol). Pain issues, shallow breathing.  CT output min, no airleak.  Passing gas, no BM.  PLAN:  Continue amio protocol. Add PO dilaudid.  Stress IS use.  DC mediastinal CTs and gill.  Diurese.   POD 3 HDS, Afib for 40 minutes this am- now SR- on amio gtt, CT bilat pleural tubes- 50/70 cc, sedate- dc ms contin, inc diuresis, amb, enc IS  POD 4 HDS, NSR on amio, min output blakes.  ABD distention, hyper/distant bowel sounds, RUQ pain.  States passing gas, but no BM.  PLAN:  DC blakes.  Continue diuresis.  ABD xray now.  Add reglan. Limit narcotics. AMB/IS.  POD 5 HDS/HTN, NSR/ST, short bout of afib again with ambulation rates to 140s, currently NSR on amio gtt.  States feels a little better this AM.  Had large loose/watery stool this AM.  ABD remains very distended, hypobowel sounds.  PLAN:  Keep NPO, gen surg following.  Continue diuresis, decrease amount since NPO.  Decrease amio gtt to 0.5 mg/min.  Increase BB. AMB/IS.    POD 6 HDS, SR/ST- inc beta blocker- change amio to PO, ileus- tolerating clear liquids- surgery following, replace K+,  amb, enc IS   POD 7 HDS< NSR, n/v overnight, abd remains distended, hypo bowel sounds.  K low, creatinine up slightly.  PLAN;  Upper GI series this AM.  Replace K.  DC lasix, start maintenance fluids while NPO.  CPM.  POD 8 HDS SR, no n/v overnight, episode of diarrhea- gen surgery following, hypokalemia- start k-scale, Cr stable, amb, enc IS  POD 9 HDS, SR, ileus- formed stool last night- getting reg diet today- gen surgery following, hypokalemia- on k+ scale- add oral potassium, Cr stable, planning flight home tues/wed if tolerates diet  POD 10  HDS, SR, neuro intact, wounds CDI, abdomin hypoactive BS but did have BM this AM, fluid balance negative, wt stable.  Plan:   DC tomorrow if still tolerating regular diet, BB, ACE, Statin, needs f/u in [place], IS/ambulate. CPM.  POD 11  HDS, SR, neuro intact, wounds CDI, abdomin active BS.  BM this AM, fluid balance negative, wt down.  Plan:  DC home today, BB, ACE, Statin, needs f/u in [place], IS/ambulate. CPM.    Patient seen, examined and plan reviewed with midlevel provider. I agree with the plan.      Active Hospital Problems    Diagnosis   • STEMI (ST elevation myocardial infarction) (HCC) [I21.3]     Priority: High   • HTN (hypertension) [I10]

## 2018-05-08 NOTE — DISCHARGE SUMMARY
Date of admission:  4/25/2018    Date of discharge:  5/8/2018     PREOPERATIVE DIAGNOSES:  Severe multivessel coronary artery disease, ST   elevation myocardial infarction, hypertension, left bundle branch block.     POSTOPERATIVE DIAGNOSES:  Severe multivessel coronary artery disease, ST   elevation myocardial infarction, hypertension, left bundle branch block.     PROCEDURE PERFORMED:    1.  Coronary Angiography, Left Heart Catheterization, Left Ventriculography.  PTCA of occluded mid Circ    2.  Coronary artery bypass grafting x4 (right internal   mammary artery to mid left anterior descending artery, left internal mammary   artery to second obtuse marginal branch, reverse saphenous vein graft to   posterior descending artery, reverse saphenous vein graft to second diagonal   branch), endoscopic vein harvesting, transesophageal echocardiography.    INDICATIONS FOR PROCEDURE:  This is a 48-year-old man who presented with acute   lateral wall STEMI and underwent Plain Old Balloon Angioplasty (POBA) to the mid circumflex artery for   restoration of EZEKIEL-3 flow, but had residual severe multivessel coronary   artery disease.  On coronary angiogram, he was found to have a 90% proximal   OM1 lesion.  There were 2 small obtuse marginal arteries and then the mid left   circumflex was subtotally occluded with a greater than 99% stenosis and EZEKIEL-1   flow. POBA was performed, which left when the patient was left   with no residual stenosis and EZEKIEL-3 flow to the terminus of the circumflex.    The left anterior descending artery demonstrated 95% proximal stenosis and a   very distal subtotal stenosis near the apex of the LAD.  There is also a large   second bifurcating diagonal artery with proximal stenosis in the more medial   limb of the bifurcation with an elongated 90% stenosis and the other vessel   with a small caliber.  In addition, the right coronary artery was a dominant   vessel that had an 80% eccentric stenosis  in the mid portion as well as 70%   stenosis proximal to the acute marginal artery, also a moderate size posterior   descending artery.  The left ventriculogram demonstrated akinesis of the mid   inferior wall and a calculated ejection fraction of 50%.  Patient underwent a   transthoracic echocardiogram that revealed ejection fraction of 45% with   inferior hypokinesis and right ventricular dysfunction.  Right ventricular   systolic pressure of 40 mmHg.  There was no significant valvular disease.  The   patient was subsequently referred to me for coronary revascularization.  The   risks and benefits of coronary bypass grafting were discussed with the patient   with risks including myocardial infarction, stroke, pneumonia, prolonged   ventilation, tracheostomy, renal failure, bleeding, sternal wound infection,   permanent pacemaker as well as death.  The patient understood these risks and   wished to proceed with surgery.    Hospital course:  POD 1 HDS on low dose epi.  NSR. Vented, ABGs good, weaning to extubated this AM. CT output min, no airlekas.  Will keep CTs/gill today.  Plan to start diuresis once BP stable off epi.  Continuing ABX x 72 hours for bilateral ELIANE harvests.  CPM.  POD 2 HDS, NSR (afib with RVR this AM, converted on amio gtt protocol). Pain issues, shallow breathing.  CT output min, no airleak.  Passing gas, no BM.  PLAN:  Continue amio protocol. Add PO dilaudid.  Stress IS use.  DC mediastinal CTs and gill.  Diurese.   POD 3 HDS, Afib for 40 minutes this am- now SR- on amio gtt, CT bilat pleural tubes- 50/70 cc, sedate- dc ms contin, inc diuresis, amb, enc IS  POD 4 HDS, NSR on amio, min output blakes.  ABD distention, hyper/distant bowel sounds, RUQ pain.  States passing gas, but no BM.  PLAN:  DC blakes.  Continue diuresis.  ABD xray now.  Add reglan. Limit narcotics. AMB/IS.  POD 5 HDS/HTN, NSR/ST, short bout of afib again with ambulation rates to 140s, currently NSR on amio gtt.  States  feels a little better this AM.  Had large loose/watery stool this AM.  ABD remains very distended, hypobowel sounds.  PLAN:  Keep NPO, gen surg following.  Continue diuresis, decrease amount since NPO.  Decrease amio gtt to 0.5 mg/min.  Increase BB. AMB/IS.    POD 6 HDS, SR/ST- inc beta blocker- change amio to PO, ileus- tolerating clear liquids- surgery following, replace K+,  amb, enc IS   POD 7 HDS, NSR, n/v overnight, abd remains distended, hypo bowel sounds.  K low, creatinine up slightly.  PLAN;  Upper GI series this AM.  Replace K.  DC lasix, start maintenance fluids while NPO.  CPM.  POD 8 HDS SR, no n/v overnight, episode of diarrhea- gen surgery following, hypokalemia- start k-scale, Cr stable, amb, enc IS  POD 9 HDS, SR, ileus- formed stool last night- getting reg diet today- gen surgery following, hypokalemia- on k+ scale- add oral potassium, Cr stable, planning flight home tues/wed if tolerates diet  POD 10  HDS, SR, neuro intact, wounds CDI, abdomin hypoactive BS but did have BM this AM, fluid balance negative, wt stable.  Plan:  DC tomorrow if still tolerating regular diet, BB, ACE, Statin, needs f/u in [place], IS/ambulate. CPM.  POD 11  HDS, SR, neuro intact, wounds CDI, abdomin active BS.  BM this AM, fluid balance negative, wt down.  Plan:  DC home today, BB, ACE, Statin, needs f/u in [place], IS/ambulate. WBC up.  Watch.  CPM.    Discharge medications:    START taking these medications       Instructions Morning Afternoon Evening Bedtime     amiodarone 200 MG Tabs   Commonly known as:  CORDARONE      Take 1 Tab by mouth Once for 1 dose.   Dose:  200 mg                  aspirin 81 MG EC tablet   Start taking on:  5/9/2018      Take 1 Tab by mouth every day.   Dose:  81 mg                  clopidogrel 75 MG Tabs   Start taking on:  5/9/2018   Commonly known as:  PLAVIX      Take 1 Tab by mouth every day.   Dose:  75 mg                  HYDROcodone-acetaminophen 5-325 MG Tabs per tablet   Commonly known  as:  NORCO      Take 1-2 Tabs by mouth every 6 hours as needed for up to 7 days.   Dose:  1-2 Tab                  potassium chloride SA 20 MEQ Tbcr   Commonly known as:  Kdur      Take 1 Tab by mouth every day.   Dose:  20 mEq                  rosuvastatin 20 MG Tabs   Commonly known as:  CRESTOR      Take 1 Tab by mouth every evening.   Dose:  20 mg                        CHANGE how you take these medications       Instructions Morning Afternoon Evening Bedtime     losartan 50 MG Tabs   Start taking on:  5/9/2018   What changed:   · medication strength   · how much to take   Commonly known as:  COZAAR      Take 1 Tab by mouth every day.   Dose:  50 mg                        CONTINUE taking these medications       Instructions Morning Afternoon Evening Bedtime     metoprolol 50 MG Tabs   Commonly known as:  LOPRESSOR      Take by mouth 2 times a day.                         aware was not checked since he lives in [place].  He is not taking narcotics during this hospital stay so   I believe he is not as risk for abuse.    Diagnostics:  Echo:  Left Ventricle  Normal left ventricular chamber size. Mild concentric left ventricular   hypertrophy. Normal left ventricular systolic function. Left   ventricular ejection fraction is visually estimated to be 55%. Normal   diastolic function.    Right Ventricle  Right ventricle not well visualized.    Right Atrium  Enlarged right atrium.    Left Atrium  Normal left atrial size. Left atrial volume index is 26  mL/sq m.    Mitral Valve  Mitral annular calcification. No mitral stenosis. No mitral   regurgitation.    Aortic Valve  The aortic valve is not well visualized. No aortic stenosis. No aortic   insufficiency.    Tricuspid Valve  Structurally normal tricuspid valve. Mild tricuspid regurgitation.   Right ventricular systolic pressure is estimated to be 40 mmHg.    Pulmonic Valve  The pulmonic valve is not well visualized. No pulmonic stenosis. Trace   pulmonic insufficiency.  Pulmonic artery diastolic pressure is 13  mmHg.    Pericardium  No effusion.    Aorta  Normal aortic root for body surface area.    Carotid Doppler:  CONCLUSIONS   Very mild plaque of the carotid bifurcations bilaterally. Doppler    velocities are normal.    US abdomen:  HISTORY/REASON FOR EXAM:  Abnormal Labs  Abdominal pain    TECHNIQUE/EXAM DESCRIPTION AND NUMBER OF VIEWS:  Real-time sonography of the liver and biliary tree.    COMPARISON: None    FINDINGS:  The liver is normal in contour. There is no evidence of solid mass lesion. The liver measures 18.19 cm.    The gallbladder is surgically absent.  The common duct measures 0.32 cm.    The visualized pancreas is unremarkable.  The visualized aorta is normal in caliber.    Intrahepatic IVC is patent.    The portal vein is patent with hepatopetal flow.    The right kidney measures 10.57 cm.    There is no ascites.   Impression       Hepatomegaly.    No biliary duct dilatation status post cholecystectomy.     Labs:      05/06/18   0545  05/07/18   0350  05/08/18   0645   WBC  13.5*  14.7*  20.9*   RBC  3.90*  3.87*  4.31*   HEMOGLOBIN  11.6*  11.6*  12.9*   HEMATOCRIT  34.5*  34.5*  39.3*   MCV  88.5  89.1  91.2   MCH  29.7  30.0  29.9   MCHC  33.6*  33.6*  32.8*   RDW  42.2  42.2  44.1   PLATELETCT  207  232  316   MPV  10.3  10.1  10.2        05/06/18   1223  05/07/18   0350  05/08/18   0530   SODIUM  137  137  138   POTASSIUM  3.4*  3.9  5.0   CHLORIDE  105  108  110   CO2  24  23  23   GLUCOSE  92  97  87   BUN  20  16  14   CREATININE  1.25  1.12  1.13   CALCIUM  7.7*  7.7*  8.0*

## 2018-05-08 NOTE — PROGRESS NOTES
Pulmonary Critical Care Progress Note      Date of Admission:  4/25/2018    Chief Complaint:  Postoperative ventilator management for 4 V CABG    HPI: 48-year-old male who was transferred   in from an outside facility after presenting with chest pain and had an EKG   revealing an ST elevation myocardial infarction.  Dr. Caceres took the patient   to the cardiac catheterization lab late 04/25 and identified severe   multivessel disease.  PTCA was performed of 100% lesion in mid circ.  The   patient had mild LV dysfunction with ejection fraction calculated at 50% with   akinesis of the inferior wall.  Dr. Pike saw the patient in consultation   yesterday and had taken him to the operating room today for coronary bypass   grafting surgery.  A 4-vessel bypass was performed and he was transferred to   the [facility] for recovery.  The patient's LV function was good coming off pump per   anesthesia.  Patient did require epinephrine infusion for hypotension.  He has   been hyperglycemic during the case and an insulin drip was started during the   case.  He was started on dexmedetomidine and transferred to the ICU.  Initial   blood gases revealed significant metabolic acidosis, pH is 7.29 and he is   kept on high support on a ventilator and intravenous bicarbonate has been   administered.  His initial chest x-ray post-procedure revealed some   atelectasis at the left base, primarily in a little vascular plethoric   consistent with possible edema.  Tubes and lines looked good.  He is starting   to wake up to participate.  His temperature is 36.3.  He has had no ectopy,   but his baseline rhythm is left bundle-branch block.    Interval Events:  24 hour interval history reviewed  Tm 97.8  +630cc over last 24hr, -5.1L since admit  No cxr this am  Wbc 13->14->20  K 5.0  Cr 1.25->1.12->1.13    IS/PEP  Tolerating orals  Last BM 5/7      Review of Systems   Constitutional: Negative for chills and fever.   HENT: Negative for congestion.     Eyes: Negative for blurred vision and double vision.   Respiratory: Negative for cough, sputum production and shortness of breath.    Cardiovascular: Negative for chest pain and leg swelling.   Gastrointestinal: Negative for nausea and vomiting.   Genitourinary: Negative for dysuria.   Musculoskeletal: Negative for myalgias.   Neurological: Negative for focal weakness.   Psychiatric/Behavioral: Negative for depression.   All other systems reviewed and are negative.      Physical Exam   Constitutional: He is oriented to person, place, and time and well-developed, well-nourished, and in no distress.   obese   HENT:   Head: Normocephalic and atraumatic.   Mouth/Throat: No oropharyngeal exudate.   Eyes: Conjunctivae are normal. Pupils are equal, round, and reactive to light.   Neck: No tracheal deviation present.   Cardiovascular: Normal rate and regular rhythm.    Pulmonary/Chest: No stridor. He has no wheezes. He has no rales.   Abdominal: Soft. He exhibits no distension. There is no tenderness.   Musculoskeletal: He exhibits no edema.   Neurological: He is alert and oriented to person, place, and time. He has normal reflexes. No cranial nerve deficit.   Skin: Skin is warm and dry.   Psychiatric: Affect normal.   Nursing note and vitals reviewed.      PFSH:  No change.    Respiratory:     Pulse Oximetry: 96 %    HemoDynamics:  Pulse: 75, Heart Rate (Monitored): 83  Blood Pressure: 126/80, NIBP: 126/80        Fluids:  Intake/Output       05/06/18 0700 - 05/07/18 0659 05/07/18 0700 - 05/08/18 0659 05/08/18 0700 - 05/09/18 0659      7826-5146 6411-2031 Total 9555-7744 3736-2779 Total 1535-4376 9644-0998 Total       Intake    P.O.  840  90 930  980  -- 980  --  -- --    P.O. 840 90 930 980 -- 980 -- -- --    Total Intake 840 90 930 980 -- 980 -- -- --       Output    Urine  --  675 675  350  -- 350  --  -- --    Number of Times Voided 4 x 3 x 7 x 5 x 1 x 6 x -- -- --    Urine Void (mL) (non-catheter) -- 675 675 350 --  350 -- -- --    Stool  --  -- --  --  -- --  --  -- --    Number of Times Stooled 2 x 2 x 4 x 3 x 1 x 4 x -- -- --    Total Output -- 673 910 350 -- 350 -- -- --       Net I/O     840 -305 315 630 -- 630 -- -- --        Weight: 103.6 kg (228 lb 6.3 oz)  Recent Labs      18   1223  18   0350  18   0530   SODIUM  137  137  138   POTASSIUM  3.4*  3.9  5.0   CHLORIDE  105  108  110   CO2  24  23  23   BUN  20  16  14   CREATININE  1.25  1.12  1.13   MAGNESIUM  2.4   --   2.3   CALCIUM  7.7*  7.7*  8.0*       GI/Nutrition:    Liver Function  Recent Labs      18   0545  18   1223  18   03518   0530   ALTSGPT  40   --   32  34   ASTSGOT  25   --   17  24   ALKPHOSPHAT  58   --   58  65   TBILIRUBIN  1.4   --   1.1  1.0   GLUCOSE  102*  92  97  87       Heme:  Recent Labs      18   0545  18   0645   RBC  3.90*  3.87*  4.31*   HEMOGLOBIN  11.6*  11.6*  12.9*   HEMATOCRIT  34.5*  34.5*  39.3*   PLATELETCT  207  232  316       Infectious Disease:  Temp  Av.4 °C (97.5 °F)  Min: 36 °C (96.8 °F)  Max: 36.6 °C (97.8 °F)    Recent Labs      18   0545  18   03518   0530  18   0645   WBC  13.5*  14.7*   --   20.9*   NEUTSPOLYS  74.60*  73.80*   --   75.00*   LYMPHOCYTES  9.50*  11.60*   --   10.70*   MONOCYTES  9.80  9.00   --   8.10   EOSINOPHILS  4.40  3.50   --   3.40   BASOPHILS  0.40  0.40   --   0.70   ASTSGOT  25  17  24   --    ALTSGPT  40  32  34   --    ALKPHOSPHAT  58  58  65   --    TBILIRUBIN  1.4  1.1  1.0   --      Current Facility-Administered Medications   Medication Dose Frequency Provider Last Rate Last Dose   • amiodarone (CORDARONE) tablet 200 mg  200 mg Once Alfred Ramirez M.D.       • potassium chloride SA (Kdur) tablet 40 mEq  40 mEq BID Theresa Reddy, A.P.N.   40 mEq at 18 2019   • NS infusion   Continuous Joi Zaragoza M.D. 10 mL/hr at 18 1324     • metoprolol (LOPRESSOR) tablet 50 mg   50 mg BID Theresa Reddy A.P.N.   50 mg at 05/07/18 2022   • calcium carbonate (TUMS) chewable tab 500 mg  500 mg Q4HRS PRN Theresa Reddy A.P.N.   500 mg at 05/03/18 2357   • amiodarone (CORDARONE) tablet 400 mg  400 mg BID Alisson Escobar A.P.N.   400 mg at 05/08/18 0538   • ipratropium-albuterol (DUONEB) nebulizer solution 3 mL  3 mL Q4H PRN (RT) Andres Guidry M.D.   3 mL at 04/28/18 2141   • Respiratory Care per Protocol   Continuous RT Alisson Escobar A.P.N.       • Pharmacy Consult Request ...Pain Management Review 1 Each  1 Each PRN Alisson Escobar, A.P.N.       • enoxaparin (LOVENOX) inj 40 mg  40 mg DAILY Alisson Escobar, A.P.N.   40 mg at 05/07/18 0747   • aspirin EC (ECOTRIN) tablet 81 mg  81 mg DAILY Alisson Escobar, A.P.N.   81 mg at 05/07/18 0747   • clopidogrel (PLAVIX) tablet 75 mg  75 mg DAILY Alisson Escobar, A.P.N.   75 mg at 05/07/18 0748   • oxyCODONE immediate-release (ROXICODONE) tablet 5 mg  5 mg Q3HRS PRN Alisson Escobar, A.P.N.   5 mg at 05/03/18 1942   • oxyCODONE immediate release (ROXICODONE) tablet 10 mg  10 mg Q3HRS PRN Alisson Escobar, A.P.N.   10 mg at 05/01/18 1801   • tramadol (ULTRAM) 50 MG tablet 50 mg  50 mg Q4HRS PRN Alisson Escobar, A.P.N.   50 mg at 05/02/18 0147   • ondansetron (ZOFRAN) syringe/vial injection 4 mg  4 mg Q6HRS PRN Alisson Escobar, A.P.N.   4 mg at 05/04/18 0303    Or   • prochlorperazine (COMPAZINE) injection 10 mg  10 mg Q6HRS PRN Alisson Escobar, A.P.N.   10 mg at 05/04/18 0549    Or   • promethazine (PHENERGAN) suppository 25 mg  25 mg Q6HRS PRN Alisson Montanat, A.P.N.       • acetaminophen (TYLENOL) tablet 650 mg  650 mg Q4HRS PRN Alisson Escobar, A.P.N.   650 mg at 04/30/18 1910    Or   • acetaminophen (TYLENOL) suppository 650 mg  650 mg Q4HRS PRN Alisson Montanat, A.P.N.       • mag hydrox-al hydrox-simeth (MAALOX PLUS ES or MYLANTA DS) suspension 30 mL  30 mL Q4HRS PRN Alisson Escobar, A.P.N.   30 mL at 05/03/18 2119   •  diphenhydrAMINE (BENADRYL) tablet/capsule 25 mg  25 mg HS PRN - MR X 1 Alisson Escobar, A.P.N.   25 mg at 05/03/18 2243   • dextrose 50% (D50W) injection 25 mL  25 mL PRN Alisson Escobar, A.P.N.       • losartan (COZAAR) tablet 50 mg  50 mg DAILY Adelina Vieyra M.D.   50 mg at 05/07/18 0748   • rosuvastatin (CRESTOR) tablet 20 mg  20 mg Q EVENING Leighton Cook M.D.   20 mg at 05/07/18 2019     Last reviewed on 4/28/2018  8:25 PM by Iain An RIDANIA    Quality  Measures:  Labs reviewed, Medications reviewed and Radiology images reviewed                  Assessment and Plan:  Postoperative ventilator management   - extubated 4/28   - IS/PEP/Mobilize   - Continue Rt/02 Protocols   - keep dry fluid balance     Status post 4-vessel coronary bypass grafting - 4/27   - aspirin, Plavix, statin, BB, arb   - [facility] protocols   - post op pain well controlled    Status post inferior STEMI   - Status post thrombolytic therapy at outside facility   - Status post emergent angioplasty of subtotally occluded mid circumflex coronary artery   - meds as above    Acute Ileus   - tolerating diet   - bm yesterday    Acute systolic heart failure   - follow I/O closely    History of hypertension   - cont metoprolol    Hypokalemia   - I am replacing to keep > 4.0    Hiccups   - resolved    AF/RVR   - stopped IV amio   - cont oral amiodarone   - cont oral metoprolol

## 2018-05-08 NOTE — CARE PLAN
Problem: Post Op Day 4 CABG/Heart Valve Replacement  Goal: Optimal care of the Post Op CABG/Heart Valve replacement Post Op Day 4  Outcome: PROGRESSING AS EXPECTED    Intervention: Daily Weights  Completed.   Intervention: Shower daily and clean incisions twice daily with soap and water  Shower on day shift. Midline incision MEDARDO.   Intervention: Up in chair for all meals  Pt up ad mishel no assist. Pt in chair for breakfast.   Intervention: Ambulate, increasing the distance each time x 3 and before bed  Pt up ad mishel. Pt walking multiple laps in [place].   Intervention: IS q 1 hour while awake and record best IS volume  Pt proactive using IS. IS 2200

## 2018-05-09 ENCOUNTER — PATIENT OUTREACH (OUTPATIENT)
Dept: HEALTH INFORMATION MANAGEMENT | Facility: OTHER | Age: 48
End: 2018-05-09

## 2018-05-09 NOTE — LETTER
Jae Venegas  [address] [place] [id number]    May 9, 2018      Dear Jae Venegas,    [facility] wants to ensure your discharge home is safe and you or your loved ones have had all of your questions answered regarding your care after you leave the hospital.    Our discharge team was unsuccessful in our attempts to contact you telephonically and we wanted to be sure that you had a list of resources and contact information should you have any questions regarding your hospital stay, follow-up instructions, or active medical symptoms.    Questions or Concerns Regarding… Contact   Medical Questions Related to Your Discharge  (7 days a week, 8am-5pm) Contact a Nurse Care Coordinator   [phone]   Medical Questions Not Related to Your Discharge  (24 hours a day / 7 days a week)  Contact the Nurse Health Line   [phone]    Medications or Discharge Instructions Refer to your discharge packet   or contact your -[phone]   Follow-up Appointment(s) Schedule your appointment via [employer]   or contact Scheduling [phone]   Billing Review your statement via [employer]  or contact Billing [phone]   Medical Records Review your records via [employer]   or contact Medical Records [phone]     You can also easily access your medical information, test results and upcoming appointments via the [employer] free online health management tool. You can learn more and sign up at [email or web address]. For assistance setting up your [employer] account, please call [phone].    Once again, we want to ensure your discharge home is safe and that you have a clear understanding of any next steps in your care. If you have any questions or concerns, please do not hesitate to contact us, we are here for you. Thank you for choosing [facility] for your healthcare needs.    Sincerely,      Your [facility] Healthcare Team

## 2018-05-09 NOTE — PROGRESS NOTES
Discharge information provided to patient and mother of patient. No questions at this time. Follow up appointments in [place] confirmed.     Patient transported by wheelchair to hotel room at [facility] in by this RN.

## 2018-06-26 LAB — LV EJECT FRACT  99904: 60

## 2018-11-20 NOTE — ADDENDUM NOTE
Encounter addended by: Racheal Turner on: 11/20/2018  2:59 PM<BR>    Actions taken: Charge Capture section accepted

## (undated) DEVICE — DRAPE MAYO STAND - (30/CA)

## (undated) DEVICE — TUBING INSUFFLATION - (10/BX)

## (undated) DEVICE — SODIUM CHL IRRIGATION 0.9% 1000ML (12EA/CA)

## (undated) DEVICE — Device

## (undated) DEVICE — KIT ANESTHESIA W/CIRCUIT & 3/LT BAG W/FILTER (20EA/CA)

## (undated) DEVICE — ELECTRODE DUAL RETURN W/ CORD - (50/PK)

## (undated) DEVICE — KIT TOURNIQUET DLP (40EA/PK)

## (undated) DEVICE — SET EXTENSION WITH 2 PORTS (48EA/CA) ***PART #2C8610 IS A SUBSTITUTE*****

## (undated) DEVICE — SUTURE 2-0 VICRYL PLUS CTX - 36 INCH (36/BX)

## (undated) DEVICE — PENCIL ELECTSURG 10FT BTN SWH - (50/CA)

## (undated) DEVICE — SUTURE 5 SURGICAL STEEL V-40 - (12/BX) CCS CURRENT

## (undated) DEVICE — SLEEVE, VASO, THIGH, MED

## (undated) DEVICE — KIT INTROPERCUTANEOUS SHEATH - 8.5 FR W/MAX BARRIER AND BIOPATCH  (5/CA)

## (undated) DEVICE — KIT SURGIFLO W/OUT THROMBIN - (6EA/CA)

## (undated) DEVICE — STOPCOCK MALE 4-WAY - (50/CA)

## (undated) DEVICE — BLADE STERNUM SAW SURGICAL 32.0 X 6.4 MM STERILE (1/EA)

## (undated) DEVICE — PUNCH DISP VASCULAR 4.4 - 6/BX

## (undated) DEVICE — SOD. CHL. INJ. 0.9% 1000 ML - (14EA/CA 60CA/PF)

## (undated) DEVICE — SYSTEM PREVENA INCISION MNGM - (1/EA)

## (undated) DEVICE — PACK E SUTURE USED FOR - OPEN HEART  (5/BX)

## (undated) DEVICE — KIT RADIAL ARTERY 20GA W/MAX BARRIER AND BIOPATCH  (5EA/CA) #10740 IS FOR THE SET RADIAL ARTERIAL

## (undated) DEVICE — SYRINGE SAFETY 3 ML 18 GA X 1 1/2 BLUNT LL (100/BX 8BX/CA)

## (undated) DEVICE — DRESSING TRANSPARENT FILM TEGADERM 4 X 4.75" (50EA/BX)"

## (undated) DEVICE — SET BIFURCATED BLOOD - (48EA/CS)

## (undated) DEVICE — ELECTRODE 850 FOAM ADHESIVE - HYDROGEL RADIOTRNSPRNT (50/PK)

## (undated) DEVICE — MASK ANESTHESIA ADULT  - (100/CA)

## (undated) DEVICE — SET LEADWIRE 5 LEAD BEDSIDE DISPOSABLE ECG (1SET OF 5/EA)

## (undated) DEVICE — TUBE E-T HI-LO CUFF 8.5MM (10EA/PK)

## (undated) DEVICE — SUTURE 4-0 30CM STRATAFIX SPIRAL PS-2 (12EA/BX)

## (undated) DEVICE — CATHETER SCT 18FR CNVC VLV STRG (100/CA)

## (undated) DEVICE — SET FLUID WARMING STANDARD FLOW - (10/CA)

## (undated) DEVICE — SYS DLV COST CLS RM TEMP - INJECTATE (CO-SET II) (10EA/CA)

## (undated) DEVICE — INSERT STEALTH #5 - (10/BX)

## (undated) DEVICE — PAD LAP STERILE 18 X 18 - (5/PK 40PK/CA)

## (undated) DEVICE — DRAIN CHEST ADULT (6EA/CA) DELETED ITEM  ORDER #15909

## (undated) DEVICE — KIT ROOM DECONTAMINATION

## (undated) DEVICE — DERMABOND ADVANCED - (12EA/BX)

## (undated) DEVICE — SUTURE  0 ETHIBOND CT-1 30 IN (36PK/BX)

## (undated) DEVICE — GOWN WARMING STANDARD FLEX - (30/CA)

## (undated) DEVICE — NEEDLE SAFETY 18 GA X 1 1/2 IN (100EA/BX)

## (undated) DEVICE — SUTURE 7-0 PROLENE 24BV175-6 - EP8735H (36/BX)"

## (undated) DEVICE — SOLUTION NORMOSOL-4 INJ 1000ML

## (undated) DEVICE — DRAIN SILICONE CLOSED WOUND SUCTION CHANNEL 24FR ROUND HUBLESS (10/CA)

## (undated) DEVICE — LACTATED RINGERS INJ 1000 ML - (14EA/CA 60CA/PF)

## (undated) DEVICE — PROTECTOR ULNA NERVE - (36PR/CA)

## (undated) DEVICE — MICRODRIP PRIMARY VENTED 60 (48EA/CA) THIS WAS PART #2C8428 WHICH WAS DISCONTINUED

## (undated) DEVICE — TUBE CHEST 32FR. STRAIGHT - (10EA/CA)

## (undated) DEVICE — SUCTION INSTRUMENT YANKAUER BULBOUS TIP W/O VENT (50EA/CA)

## (undated) DEVICE — BLADE SURGICAL #15 - (50/BX 3BX/CA)

## (undated) DEVICE — BAG RESUSCITATION DISPOSABLE - WITH MASK (10 EA/CA)

## (undated) DEVICE — GLOVE BIOGEL PI ORTHO SZ 7.5 PF LF (40PR/BX)

## (undated) DEVICE — D-5-W INJ. 500 ML - (24EA/CA)

## (undated) DEVICE — FIBRILLAR SURGICEL 4X4 - 10/CA

## (undated) DEVICE — SUTURE 0 ETHIBOND MO6 C/R - (12/BX) 8-18 INCH ETHICON

## (undated) DEVICE — SYRINGE 30 ML LL (56/BX)

## (undated) DEVICE — CONNECTOR HUBLESS DRAINAGE - ONE WAY (20/BX)

## (undated) DEVICE — TUBING CLEARLINK DUO-VENT - C-FLO (48EA/CA)

## (undated) DEVICE — SUTURE 5-0 PROLENE C-1 D/A 24 (36PK/BX)"

## (undated) DEVICE — TRANSDUCER BIFURCATED MONITORING KIT (10EA/CA)

## (undated) DEVICE — BAG DECANTER (50EA/CS)

## (undated) DEVICE — PACK CV DRAPING/BASIN 2PART - (1/CA)

## (undated) DEVICE — NEPTUNE 4 PORT MANIFOLD - (20/PK)

## (undated) DEVICE — ARMBOARD  SMALL IV 9 INLONG - (25EA/CA)

## (undated) DEVICE — SUTURE 6-0 PROLENE RB-2 D/A 30 (36PK/BX)"

## (undated) DEVICE — KIT ENDOHARVEST SYSTEM 8 - MUST ORDER 5 AT A TIME

## (undated) DEVICE — CANISTER SUCTION 3000ML MECHANICAL FILTER AUTO SHUTOFF MEDI-VAC NONSTERILE LF DISP  (40EA/CA)

## (undated) DEVICE — BLADE BEAVER 6900 MINI (OHS) - 180 DEGREE (20/BX) DUPLICATE ITEM ORDER 3700

## (undated) DEVICE — GLOVE BIOGEL SZ 7.5 SURGICAL PF LTX - (50PR/BX 4BX/CA)

## (undated) DEVICE — HEAD HOLDER JUNIOR/ADULT

## (undated) DEVICE — SENSOR SPO2 NEO LNCS ADHESIVE (20/BX) SEE USER NOTES

## (undated) DEVICE — RETRACTOR OFF PUMP OCTO ONLY - 10/BX

## (undated) DEVICE — SODIUM CHL. INJ. 0.9% 500ML (24EA/CA 50CA/PF)

## (undated) DEVICE — DRESSING TRANSPARENT FILM TEGADERM 2.375 X 2.75"  (100EA/BX)"

## (undated) DEVICE — TRAY SURESTEP FOLEY TEMP SENSING 16FR (10EA/CA) ORDER  #18764 FOR TEMP FOLEY ONLY

## (undated) DEVICE — SENSOR CEREBRAL AND SOMATIC MONITORING (20/CA)

## (undated) DEVICE — CATHETER THERMALDILUTION SWAN - (5EA/CA)

## (undated) DEVICE — SPRING BULLDOG 1/2 FORCE BLUE - (10/BX)

## (undated) DEVICE — ADHESIVE DERMABOND HVD MINI (12EA/BX)

## (undated) DEVICE — SUTURE OHS

## (undated) DEVICE — TUBE CHEST 32FR. RIGHT ANGLED (10EA/CA)

## (undated) DEVICE — PACK VEIN - (19/CA)